# Patient Record
Sex: FEMALE | Race: WHITE | Employment: OTHER | URBAN - METROPOLITAN AREA
[De-identification: names, ages, dates, MRNs, and addresses within clinical notes are randomized per-mention and may not be internally consistent; named-entity substitution may affect disease eponyms.]

---

## 2017-01-25 ENCOUNTER — OFFICE VISIT (OUTPATIENT)
Dept: FAMILY MEDICINE CLINIC | Facility: CLINIC | Age: 82
End: 2017-01-25

## 2017-01-25 VITALS
SYSTOLIC BLOOD PRESSURE: 148 MMHG | DIASTOLIC BLOOD PRESSURE: 83 MMHG | BODY MASS INDEX: 21.71 KG/M2 | TEMPERATURE: 98 F | HEART RATE: 92 BPM | WEIGHT: 118 LBS | HEIGHT: 62 IN

## 2017-01-25 DIAGNOSIS — J06.9 ACUTE UPPER RESPIRATORY INFECTION: Primary | ICD-10-CM

## 2017-01-25 PROCEDURE — 99213 OFFICE O/P EST LOW 20 MIN: CPT | Performed by: PHYSICIAN ASSISTANT

## 2017-01-25 PROCEDURE — G0463 HOSPITAL OUTPT CLINIC VISIT: HCPCS | Performed by: PHYSICIAN ASSISTANT

## 2017-01-25 RX ORDER — AZITHROMYCIN 250 MG/1
TABLET, FILM COATED ORAL
Qty: 6 TABLET | Refills: 0 | Status: ON HOLD | OUTPATIENT
Start: 2017-01-25 | End: 2017-02-02

## 2017-01-25 NOTE — PROGRESS NOTES
HPI:    Patient ID: Soila Fajardo is a 80year old female. HPI Comments: Patient presents for cough and nasal congestion for past three days. She has had chills on and off. Cough is productive of yellow phlegm.   She states feels congestion in her ch She is oriented to person, place, and time. She appears well-developed and well-nourished. No distress. HENT:   Head: Normocephalic and atraumatic.    Right Ear: Tympanic membrane and ear canal normal.   Left Ear: Tympanic membrane and ear canal normal.

## 2017-01-28 ENCOUNTER — OFFICE VISIT (OUTPATIENT)
Dept: FAMILY MEDICINE CLINIC | Facility: CLINIC | Age: 82
End: 2017-01-28

## 2017-01-28 ENCOUNTER — APPOINTMENT (OUTPATIENT)
Dept: LAB | Facility: HOSPITAL | Age: 82
DRG: 195 | End: 2017-01-28
Attending: PHYSICIAN ASSISTANT
Payer: MEDICARE

## 2017-01-28 ENCOUNTER — HOSPITAL ENCOUNTER (OUTPATIENT)
Dept: GENERAL RADIOLOGY | Facility: HOSPITAL | Age: 82
Discharge: HOME | DRG: 195 | End: 2017-01-28
Attending: PHYSICIAN ASSISTANT | Admitting: PHYSICIAN ASSISTANT
Payer: MEDICARE

## 2017-01-28 VITALS
SYSTOLIC BLOOD PRESSURE: 148 MMHG | DIASTOLIC BLOOD PRESSURE: 85 MMHG | HEART RATE: 101 BPM | WEIGHT: 117 LBS | BODY MASS INDEX: 21.53 KG/M2 | TEMPERATURE: 100 F | HEIGHT: 62 IN

## 2017-01-28 DIAGNOSIS — J20.9 ACUTE BRONCHITIS, UNSPECIFIED ORGANISM: ICD-10-CM

## 2017-01-28 DIAGNOSIS — J20.9 ACUTE BRONCHITIS, UNSPECIFIED ORGANISM: Primary | ICD-10-CM

## 2017-01-28 PROCEDURE — 99213 OFFICE O/P EST LOW 20 MIN: CPT | Performed by: PHYSICIAN ASSISTANT

## 2017-01-28 PROCEDURE — 71020 XR CHEST PA + LAT CHEST (CPT=71020): CPT

## 2017-01-28 PROCEDURE — G0463 HOSPITAL OUTPT CLINIC VISIT: HCPCS | Performed by: PHYSICIAN ASSISTANT

## 2017-01-28 RX ORDER — BENZONATATE 100 MG/1
100 CAPSULE ORAL 3 TIMES DAILY PRN
Qty: 30 CAPSULE | Refills: 0 | Status: SHIPPED | OUTPATIENT
Start: 2017-01-28 | End: 2017-02-10 | Stop reason: ALTCHOICE

## 2017-01-28 NOTE — PROGRESS NOTES
HPI:    Patient ID: Bg Powell is a 80year old female. HPI Comments: Patient presents for follow up on cough. She states cough has worsened in past couple days. She has had fever and chills on and off.   She is currently on Zithromax and has two route  every day Disp:  Rfl:    Omega-3 Fatty Acids (RA FISH OIL) 1000 MG Oral Cap Take  by mouth.  FISH OIL (unknown strength) Disp:  Rfl:    latanoprost (XALATAN) 0.005 % Ophthalmic Solution  Disp:  Rfl:      Allergies:No Known Allergies   PHYSICAL EXAM: Imaging & Referrals:  XR CHEST PA + LAT CHEST (CPT=[id number])       OU#[id number]

## 2017-01-30 ENCOUNTER — APPOINTMENT (OUTPATIENT)
Dept: GENERAL RADIOLOGY | Facility: HOSPITAL | Age: 82
DRG: 195 | End: 2017-01-30
Attending: EMERGENCY MEDICINE
Payer: MEDICARE

## 2017-01-30 ENCOUNTER — HOSPITAL ENCOUNTER (INPATIENT)
Facility: HOSPITAL | Age: 82
LOS: 3 days | Discharge: HOME | DRG: 195 | End: 2017-02-02
Attending: EMERGENCY MEDICINE | Admitting: HOSPITALIST
Payer: MEDICARE

## 2017-01-30 DIAGNOSIS — J18.9 COMMUNITY ACQUIRED PNEUMONIA: Primary | ICD-10-CM

## 2017-01-30 DIAGNOSIS — R09.02 HYPOXIA: ICD-10-CM

## 2017-01-30 LAB
ADENOVIRUS PCR:: NEGATIVE
ANION GAP SERPL CALC-SCNC: 10 MMOL/L (ref 0–18)
B PERT DNA SPEC QL NAA+PROBE: NEGATIVE
BASOPHILS # BLD: 0 K/UL (ref 0–0.2)
BASOPHILS NFR BLD: 1 %
BNP SERPL-MCNC: 40 PG/ML (ref 0–100)
BUN SERPL-MCNC: 9 MG/DL (ref 8–20)
BUN/CREAT SERPL: 14.8 (ref 10–20)
C PNEUM DNA SPEC QL NAA+PROBE: NEGATIVE
CALCIUM SERPL-MCNC: 8.9 MG/DL (ref 8.5–10.5)
CHLORIDE SERPL-SCNC: 102 MMOL/L (ref 95–110)
CO2 SERPL-SCNC: 26 MMOL/L (ref 22–32)
CORONAVIRUS 229E PCR:: NEGATIVE
CORONAVIRUS HKU1 PCR:: NEGATIVE
CORONAVIRUS NL63 PCR:: NEGATIVE
CORONAVIRUS OC43 PCR:: NEGATIVE
CREAT SERPL-MCNC: 0.61 MG/DL (ref 0.5–1.5)
EOSINOPHIL # BLD: 0.1 K/UL (ref 0–0.7)
EOSINOPHIL NFR BLD: 2 %
ERYTHROCYTE [DISTWIDTH] IN BLOOD BY AUTOMATED COUNT: 13 % (ref 11–15)
FLUAV H1 2009 PAND RNA SPEC QL NAA+PROBE: NEGATIVE
FLUAV H1 RNA SPEC QL NAA+PROBE: NEGATIVE
FLUAV H3 RNA SPEC QL NAA+PROBE: NEGATIVE
FLUAV RNA SPEC QL NAA+PROBE: NEGATIVE
FLUBV RNA SPEC QL NAA+PROBE: NEGATIVE
GLUCOSE SERPL-MCNC: 98 MG/DL (ref 70–99)
HCT VFR BLD AUTO: 39.1 % (ref 35–48)
HGB BLD-MCNC: 13.4 G/DL (ref 12–16)
LYMPHOCYTES # BLD: 1.4 K/UL (ref 1–4)
LYMPHOCYTES NFR BLD: 36 %
MCH RBC QN AUTO: 32.1 PG (ref 27–32)
MCHC RBC AUTO-ENTMCNC: 34.3 G/DL (ref 32–37)
MCV RBC AUTO: 93.6 FL (ref 80–100)
METAPNEUMOVIRUS PCR:: NEGATIVE
MONOCYTES # BLD: 0.5 K/UL (ref 0–1)
MONOCYTES NFR BLD: 12 %
MYCOPLASMA PNEUMONIA PCR:: NEGATIVE
NEUTROPHILS # BLD AUTO: 1.9 K/UL (ref 1.8–7.7)
NEUTROPHILS NFR BLD: 49 %
OSMOLALITY UR CALC.SUM OF ELEC: 285 MOSM/KG (ref 275–295)
PARAINFLUENZA 1 PCR:: NEGATIVE
PARAINFLUENZA 2 PCR:: NEGATIVE
PARAINFLUENZA 3 PCR:: NEGATIVE
PARAINFLUENZA 4 PCR:: NEGATIVE
PLATELET # BLD AUTO: 189 K/UL (ref 140–400)
PMV BLD AUTO: 7.5 FL (ref 7.4–10.3)
POTASSIUM SERPL-SCNC: 3.7 MMOL/L (ref 3.3–5.1)
RBC # BLD AUTO: 4.18 M/UL (ref 3.7–5.4)
RHINOVIRUS/ENTERO PCR:: NEGATIVE
RSV RNA SPEC QL NAA+PROBE: POSITIVE
SODIUM SERPL-SCNC: 138 MMOL/L (ref 136–144)
TROPONIN I SERPL-MCNC: 0 NG/ML (ref ?–0.03)
WBC # BLD AUTO: 3.9 K/UL (ref 4–11)

## 2017-01-30 PROCEDURE — 99222 1ST HOSP IP/OBS MODERATE 55: CPT | Performed by: HOSPITALIST

## 2017-01-30 PROCEDURE — 71020 XR CHEST PA + LAT CHEST (CPT=71020): CPT

## 2017-01-30 RX ORDER — HEPARIN SODIUM 5000 [USP'U]/ML
5000 INJECTION, SOLUTION INTRAVENOUS; SUBCUTANEOUS EVERY 12 HOURS
Status: DISCONTINUED | OUTPATIENT
Start: 2017-01-30 | End: 2017-02-02

## 2017-01-30 RX ORDER — SODIUM CHLORIDE 9 MG/ML
INJECTION, SOLUTION INTRAVENOUS ONCE
Status: DISCONTINUED | OUTPATIENT
Start: 2017-01-30 | End: 2017-01-31

## 2017-01-30 RX ORDER — TETANUS AND DIPHTHERIA TOXOIDS ADSORBED 2; 2 [LF]/.5ML; [LF]/.5ML
INJECTION INTRAMUSCULAR
Status: DISCONTINUED
Start: 2017-01-30 | End: 2017-01-30

## 2017-01-30 RX ORDER — ONDANSETRON 2 MG/ML
4 INJECTION INTRAMUSCULAR; INTRAVENOUS EVERY 6 HOURS PRN
Status: DISCONTINUED | OUTPATIENT
Start: 2017-01-30 | End: 2017-02-02

## 2017-01-30 RX ORDER — SODIUM CHLORIDE 9 MG/ML
INJECTION, SOLUTION INTRAVENOUS CONTINUOUS
Status: DISCONTINUED | OUTPATIENT
Start: 2017-01-30 | End: 2017-02-02

## 2017-01-30 RX ORDER — ALBUTEROL SULFATE 2.5 MG/3ML
2.5 SOLUTION RESPIRATORY (INHALATION) EVERY 4 HOURS PRN
Status: DISCONTINUED | OUTPATIENT
Start: 2017-01-30 | End: 2017-02-02

## 2017-01-30 RX ORDER — GUAIFENESIN 100 MG/5ML
200 SOLUTION ORAL EVERY 4 HOURS PRN
Status: DISCONTINUED | OUTPATIENT
Start: 2017-01-30 | End: 2017-02-02

## 2017-01-30 RX ORDER — ACETAMINOPHEN 325 MG/1
650 TABLET ORAL EVERY 6 HOURS PRN
Status: DISCONTINUED | OUTPATIENT
Start: 2017-01-30 | End: 2017-02-02

## 2017-01-30 RX ORDER — IPRATROPIUM BROMIDE AND ALBUTEROL SULFATE 2.5; .5 MG/3ML; MG/3ML
3 SOLUTION RESPIRATORY (INHALATION) ONCE
Status: COMPLETED | OUTPATIENT
Start: 2017-01-30 | End: 2017-01-30

## 2017-01-30 RX ORDER — POTASSIUM CHLORIDE 20 MEQ/1
40 TABLET, EXTENDED RELEASE ORAL ONCE
Status: COMPLETED | OUTPATIENT
Start: 2017-01-31 | End: 2017-01-31

## 2017-01-30 NOTE — ED INITIAL ASSESSMENT (HPI)
Patient c/o productive cough, low grade fever, congestion x 5 days. Patient states she had xray done outpatient Saturday, which came back negative. States she finished last of z miek yesterday without improvement.

## 2017-01-30 NOTE — ED PROVIDER NOTES
Patient Seen in: [facility] Emergency Department    History   Patient presents with:  Cough/URI    Stated Complaint: Cough, back pain     HPI    The patient is a 41-year-old female with past history of hyperlipidemia, gastroesophageal reflux disea tablet   Multiple Vitamins-Minerals (CENTRUM SILVER) Oral Tab,  Take  by mouth. take 1 tablet by oral route  every day   Omega-3 Fatty Acids (RA FISH OIL) 1000 MG Oral Cap,  Take  by mouth.  FISH OIL (unknown strength)   latanoprost (XALATAN) 0.005 % Ophtha nondistended  Neurologic: Patient is awake, alert and oriented ×3.   The patient's motor strength is 5 out of 5 and symmetric in the upper and lower extremities bilaterally  Extremities: No focal swelling or tenderness  Skin: No pallor, no redness or warmth Medication List        Present on Admission  Date Reviewed: 1/28/2017          ICD-10-CM Noted POA    Community acquired pneumonia J18.9 1/30/2017 Unknown

## 2017-01-31 ENCOUNTER — APPOINTMENT (OUTPATIENT)
Dept: GENERAL RADIOLOGY | Facility: HOSPITAL | Age: 82
DRG: 195 | End: 2017-01-31
Attending: HOSPITALIST
Payer: MEDICARE

## 2017-01-31 LAB
ANION GAP SERPL CALC-SCNC: 5 MMOL/L (ref 0–18)
BASOPHILS # BLD: 0 K/UL (ref 0–0.2)
BASOPHILS NFR BLD: 1 %
BUN SERPL-MCNC: 6 MG/DL (ref 8–20)
BUN/CREAT SERPL: 12.5 (ref 10–20)
CALCIUM SERPL-MCNC: 8.2 MG/DL (ref 8.5–10.5)
CHLORIDE SERPL-SCNC: 109 MMOL/L (ref 95–110)
CO2 SERPL-SCNC: 27 MMOL/L (ref 22–32)
CREAT SERPL-MCNC: 0.48 MG/DL (ref 0.5–1.5)
EOSINOPHIL # BLD: 0.1 K/UL (ref 0–0.7)
EOSINOPHIL NFR BLD: 2 %
ERYTHROCYTE [DISTWIDTH] IN BLOOD BY AUTOMATED COUNT: 12.7 % (ref 11–15)
GLUCOSE SERPL-MCNC: 90 MG/DL (ref 70–99)
HCT VFR BLD AUTO: 34.2 % (ref 35–48)
HGB BLD-MCNC: 12 G/DL (ref 12–16)
L PNEUMO AG UR QL: NEGATIVE
LYMPHOCYTES # BLD: 1.3 K/UL (ref 1–4)
LYMPHOCYTES NFR BLD: 36 %
MCH RBC QN AUTO: 32.6 PG (ref 27–32)
MCHC RBC AUTO-ENTMCNC: 35 G/DL (ref 32–37)
MCV RBC AUTO: 93.1 FL (ref 80–100)
MONOCYTES # BLD: 0.6 K/UL (ref 0–1)
MONOCYTES NFR BLD: 15 %
NEUTROPHILS # BLD AUTO: 1.7 K/UL (ref 1.8–7.7)
NEUTROPHILS NFR BLD: 46 %
OSMOLALITY UR CALC.SUM OF ELEC: 289 MOSM/KG (ref 275–295)
PLATELET # BLD AUTO: 178 K/UL (ref 140–400)
PMV BLD AUTO: 7.9 FL (ref 7.4–10.3)
POTASSIUM SERPL-SCNC: 4 MMOL/L (ref 3.3–5.1)
POTASSIUM SERPL-SCNC: 4 MMOL/L (ref 3.3–5.1)
RBC # BLD AUTO: 3.67 M/UL (ref 3.7–5.4)
SODIUM SERPL-SCNC: 141 MMOL/L (ref 136–144)
STREP PNEUMO ANTIGEN, URINE: NEGATIVE
WBC # BLD AUTO: 3.6 K/UL (ref 4–11)

## 2017-01-31 PROCEDURE — 71020 XR CHEST PA + LAT CHEST (CPT=71020): CPT

## 2017-01-31 RX ORDER — BENZONATATE 100 MG/1
100 CAPSULE ORAL 3 TIMES DAILY PRN
Status: DISCONTINUED | OUTPATIENT
Start: 2017-01-31 | End: 2017-02-02

## 2017-01-31 RX ORDER — PANTOPRAZOLE SODIUM 40 MG/1
40 TABLET, DELAYED RELEASE ORAL
Status: DISCONTINUED | OUTPATIENT
Start: 2017-01-31 | End: 2017-02-02

## 2017-01-31 RX ORDER — LATANOPROST 50 UG/ML
1 SOLUTION/ DROPS OPHTHALMIC NIGHTLY
Status: DISCONTINUED | OUTPATIENT
Start: 2017-01-31 | End: 2017-02-02

## 2017-01-31 RX ORDER — ASPIRIN 81 MG/1
81 TABLET ORAL DAILY
Status: DISCONTINUED | OUTPATIENT
Start: 2017-01-31 | End: 2017-02-02

## 2017-01-31 RX ORDER — OYSTER SHELL CALCIUM WITH VITAMIN D 500; 200 MG/1; [IU]/1
1 TABLET, FILM COATED ORAL DAILY
Status: DISCONTINUED | OUTPATIENT
Start: 2017-01-31 | End: 2017-02-02

## 2017-01-31 NOTE — PROGRESS NOTES
[facility]    Progress Note    Danielle Mcclure Patient Status:  Inpatient    1930 MRN [id number]   Location [facility] 5SW/SE Attending Heather Orellana MD   Hosp Day # 1 PCP Sky Kothari MD       SUBJECTIVE:  Feeling m opacity which could represent atelectasis/scar or pneumonia/infiltrate.         Meds:     Current Facility-Administered Medications:  aspirin EC tab 81 mg 81 mg Oral Daily   benzonatate (TESSALON) cap 100 mg 100 mg Oral TID PRN   Calcium Carbonate-Vitamin D treated w/ azithromycin since Wednesday prior to admission without improvement  Unclear if early pneumonia was not responsive to azithromycin or if patient was only infected with virus at that time  Plan:  Continue ceftriaxone and azithromycin  Conservativ

## 2017-01-31 NOTE — PLAN OF CARE
Patient/Family Long Term Goal Not Progressing    PNEUMONIA TO BE RESOLVED, OPTIMAL RESPIRATORY STATUS  Patient/Family Short Term Goal Not Progressing    VITALS SIGNS STABLE, NO RESPIRATORY DISTRESS

## 2017-01-31 NOTE — PLAN OF CARE
Infection    • Lung sounds are clear/returned to baseline bilaterally Progressing    • Signs and symptoms of infections are decreased or avoided Progressing        Patient/Family Goals    • Patient/Family Long Term Goal Progressing    • Patient/Family Milly Rutherford

## 2017-01-31 NOTE — PLAN OF CARE
Problem: Patient/Family Goals  Goal: Patient/Family Short Term Goal  Patient’s Short Term Goal: VITAL SIGNS STABLE, NO RESPIRATORY DISTRESS    Interventions:   RESPIRATORY TREATMENTS, IV ANTIBIOTICS  - See additional Care Plan goals for specific interventi

## 2017-01-31 NOTE — PLAN OF CARE
Infection    • Lung sounds are clear/returned to baseline bilaterally Progressing    • Signs and symptoms of infections are decreased or avoided Progressing        Patient/Family Goals    • Patient/Family Long Term Goal Progressing    • Patient/Family Iona Din

## 2017-01-31 NOTE — CM/SW NOTE
Met with patient at bedside to explain the BPCI/Medicare program. Patient agreed with phone follow up for 3 months from [facility] after discharge from [address] [place] [address]. Patient was enrolled under DRG  195     . BPCI/Medicare letter and brochure provided.  Pt infor

## 2017-01-31 NOTE — H&P
[facility]    PATIENT'S NAME: Clinton Pope EL   ATTENDING PHYSICIAN: Ced Taylor MD   PATIENT ACCOUNT#:   [id number]    LOCATION:  [place] [address]  MEDICAL RECORD #:   [id number]       YOB: 1930  ADMISSION DATE:       01/30/2 EXAMINATION:    GENERAL:  Alert and oriented to time, place, and person. Mild to moderate distress. VITAL SIGNS:  Temperature 98.3, pulse 76, respiratory rate 18, blood pressure 186/78, and pulse oximetry 95% on room air. HEENT:  Atraumatic.   Odette Pitcher

## 2017-01-31 NOTE — ED NOTES
Patient presents to ED with persistent SOB and productive cough since last Wednesday. Pt states that she visited her PCP on Wednesday, was given antibiotics, and sent home.  On Saturday, she went to the immediate care clinic for persistent symptoms and a ch

## 2017-01-31 NOTE — PLAN OF CARE
Problem: Patient/Family Goals  Goal: Patient/Family Long Term Goal  Patient’s Long Term Goal: PNEUMONIA TO BE RESOLVED, OPTIMAL RESPIRATORY STATUS    Interventions:  IV ANTIBIOTICS, DUONEB TREATMENTS  - See additional Care Plan goals for specific intervent

## 2017-02-01 LAB
ANION GAP SERPL CALC-SCNC: 8 MMOL/L (ref 0–18)
BASOPHILS # BLD: 0 K/UL (ref 0–0.2)
BASOPHILS NFR BLD: 1 %
BUN SERPL-MCNC: 7 MG/DL (ref 8–20)
BUN/CREAT SERPL: 11.5 (ref 10–20)
CALCIUM SERPL-MCNC: 9 MG/DL (ref 8.5–10.5)
CHLORIDE SERPL-SCNC: 105 MMOL/L (ref 95–110)
CO2 SERPL-SCNC: 26 MMOL/L (ref 22–32)
CREAT SERPL-MCNC: 0.61 MG/DL (ref 0.5–1.5)
EOSINOPHIL # BLD: 0.2 K/UL (ref 0–0.7)
EOSINOPHIL NFR BLD: 5 %
ERYTHROCYTE [DISTWIDTH] IN BLOOD BY AUTOMATED COUNT: 13.1 % (ref 11–15)
GLUCOSE SERPL-MCNC: 96 MG/DL (ref 70–99)
HCT VFR BLD AUTO: 37.7 % (ref 35–48)
HGB BLD-MCNC: 12.9 G/DL (ref 12–16)
LYMPHOCYTES # BLD: 1.4 K/UL (ref 1–4)
LYMPHOCYTES NFR BLD: 35 %
MCH RBC QN AUTO: 31.8 PG (ref 27–32)
MCHC RBC AUTO-ENTMCNC: 34.1 G/DL (ref 32–37)
MCV RBC AUTO: 93.2 FL (ref 80–100)
MONOCYTES # BLD: 0.5 K/UL (ref 0–1)
MONOCYTES NFR BLD: 11 %
NEUTROPHILS # BLD AUTO: 2 K/UL (ref 1.8–7.7)
NEUTROPHILS NFR BLD: 49 %
OSMOLALITY UR CALC.SUM OF ELEC: 286 MOSM/KG (ref 275–295)
PLATELET # BLD AUTO: 207 K/UL (ref 140–400)
PMV BLD AUTO: 8.1 FL (ref 7.4–10.3)
POTASSIUM SERPL-SCNC: 4 MMOL/L (ref 3.3–5.1)
RBC # BLD AUTO: 4.05 M/UL (ref 3.7–5.4)
SODIUM SERPL-SCNC: 139 MMOL/L (ref 136–144)
WBC # BLD AUTO: 4.1 K/UL (ref 4–11)

## 2017-02-01 PROCEDURE — 99233 SBSQ HOSP IP/OBS HIGH 50: CPT | Performed by: HOSPITALIST

## 2017-02-01 NOTE — DISCHARGE PLANNING
PERFECTO received for dc planning for possible hhc. Pt intends to return home. She is active and independent, does not intend to be homebound. RN confirmed the pt is on room air, no current needs identified.     alton heredia,JUAN [id number]

## 2017-02-01 NOTE — PLAN OF CARE
Infection    • Lung sounds are clear/returned to baseline bilaterally Progressing    • Signs and symptoms of infections are decreased or avoided Progressing        Patient/Family Goals    • Patient/Family Long Term Goal Progressing    • Patient/Family Esa Guzman

## 2017-02-01 NOTE — PLAN OF CARE
DISCHARGE PLANNING    • Discharge to home or other facility with appropriate resources Progressing        Infection    • Lung sounds are clear/returned to baseline bilaterally Progressing    • Signs and symptoms of infections are decreased or avoided Progr

## 2017-02-01 NOTE — PROGRESS NOTES
[facility]    Progress Note    Philippe Rojas Patient Status:  Inpatient    1930 MRN [id number]   Location [facility] 5SW/SE Attending Mary Carmen Perkins MD   Hosp Day # 2 PCP Elena Whitman MD     Subjective:  Coughing more today (cpt=71020)    1/30/2017  CONCLUSION:  1.  Pulmonary emphysema. 2.  Left upper lobe lingular segment pulmonary opacity which could represent atelectasis/scar or pneumonia/infiltrate.       Ekg 12-lead    1/30/2017  ECG Report  Interpretation  --------------

## 2017-02-02 VITALS
HEART RATE: 75 BPM | OXYGEN SATURATION: 92 % | WEIGHT: 115 LBS | BODY MASS INDEX: 21.16 KG/M2 | SYSTOLIC BLOOD PRESSURE: 139 MMHG | TEMPERATURE: 98 F | HEIGHT: 62 IN | RESPIRATION RATE: 18 BRPM | DIASTOLIC BLOOD PRESSURE: 47 MMHG

## 2017-02-02 PROCEDURE — 99239 HOSP IP/OBS DSCHRG MGMT >30: CPT | Performed by: HOSPITALIST

## 2017-02-02 RX ORDER — AZITHROMYCIN 250 MG/1
250 TABLET, FILM COATED ORAL DAILY
Qty: 3 TABLET | Refills: 0 | Status: SHIPPED | OUTPATIENT
Start: 2017-02-02 | End: 2017-02-05

## 2017-02-02 RX ORDER — ALBUTEROL SULFATE 90 UG/1
2 AEROSOL, METERED RESPIRATORY (INHALATION) EVERY 6 HOURS PRN
Qty: 1 INHALER | Refills: 0 | Status: SHIPPED | OUTPATIENT
Start: 2017-02-02 | End: 2017-02-10 | Stop reason: ALTCHOICE

## 2017-02-02 NOTE — DISCHARGE SUMMARY
[facility]    Discharge Summary    Ellen Ruiz Patient Status:  Inpatient    1930 MRN [id number]   [facility] 5SW/SE Attending Hill Mcnulty MD   [facility] Day # 3 PCP Noretta Harada, MD     Date of Admission: 2017 Instructions Prescription details    azithromycin 250 MG Tabs   Commonly known as:  Amy Coronel   What changed:    - how much to take  - how to take this  - when to take this  - additional instructions        Take 1 tablet (250 mg total) by mouth daily.     Bernardo Victoria Albuterol Sulfate  (90 Base) MCG/ACT Aers  - azithromycin 250 MG Tabs        Follow up Visits: Follow-up Information     Follow up with Era Mckeon MD In 1 week.     Specialty:  Internal Medicine    Contact information:    [place]

## 2017-02-02 NOTE — PLAN OF CARE
DISCHARGE PLANNING    • Discharge to home or other facility with appropriate resources Progressing        Plan is discharge tomorrow.      Infection    • Lung sounds are clear/returned to baseline bilaterally Progressing    • Signs and symptoms of infection

## 2017-02-03 ENCOUNTER — TELEPHONE (OUTPATIENT)
Dept: MEDSURG UNIT | Facility: HOSPITAL | Age: 82
End: 2017-02-03

## 2017-02-10 ENCOUNTER — OFFICE VISIT (OUTPATIENT)
Dept: FAMILY MEDICINE CLINIC | Facility: CLINIC | Age: 82
End: 2017-02-10

## 2017-02-10 VITALS
SYSTOLIC BLOOD PRESSURE: 145 MMHG | WEIGHT: 114 LBS | BODY MASS INDEX: 20.98 KG/M2 | HEART RATE: 84 BPM | HEIGHT: 62 IN | DIASTOLIC BLOOD PRESSURE: 77 MMHG

## 2017-02-10 DIAGNOSIS — J18.9 COMMUNITY ACQUIRED PNEUMONIA: Primary | ICD-10-CM

## 2017-02-10 PROCEDURE — 99213 OFFICE O/P EST LOW 20 MIN: CPT | Performed by: PHYSICIAN ASSISTANT

## 2017-02-10 PROCEDURE — G0463 HOSPITAL OUTPT CLINIC VISIT: HCPCS | Performed by: PHYSICIAN ASSISTANT

## 2017-02-10 NOTE — PROGRESS NOTES
HPI:    Patient ID: Luis Roa is a 80year old female. HPI Comments: Patient presents for follow up from hospital for pneumonia. Patient was seen in office for cough which had been progressing.   She had chest xray as ordered 1/28/17 which was neg (XALATAN) 0.005 % Ophthalmic Solution Place 1 drop into both eyes nightly. Disp:  Rfl:      Allergies:No Known Allergies   PHYSICAL EXAM:   Physical Exam   Constitutional: She is oriented to person, place, and time.  She appears well-developed and well-no

## 2017-03-08 ENCOUNTER — OFFICE VISIT (OUTPATIENT)
Dept: INTERNAL MEDICINE CLINIC | Facility: CLINIC | Age: 82
End: 2017-03-08

## 2017-03-08 VITALS
WEIGHT: 117 LBS | TEMPERATURE: 98 F | HEIGHT: 62 IN | BODY MASS INDEX: 21.53 KG/M2 | SYSTOLIC BLOOD PRESSURE: 120 MMHG | DIASTOLIC BLOOD PRESSURE: 71 MMHG | HEART RATE: 67 BPM

## 2017-03-08 DIAGNOSIS — J18.9 COMMUNITY ACQUIRED PNEUMONIA: Primary | ICD-10-CM

## 2017-03-08 DIAGNOSIS — Z12.31 VISIT FOR SCREENING MAMMOGRAM: ICD-10-CM

## 2017-03-08 DIAGNOSIS — M81.0 OSTEOPOROSIS: ICD-10-CM

## 2017-03-08 DIAGNOSIS — K21.9 GASTRIC REFLUX SYNDROME: ICD-10-CM

## 2017-03-08 PROCEDURE — G0463 HOSPITAL OUTPT CLINIC VISIT: HCPCS | Performed by: INTERNAL MEDICINE

## 2017-03-08 PROCEDURE — 99214 OFFICE O/P EST MOD 30 MIN: CPT | Performed by: INTERNAL MEDICINE

## 2017-03-08 NOTE — PROGRESS NOTES
HPI:    Patient ID: Angela Clark is a 80year old female. HPI patient is a very active 15-year-old white female who comes in to address her medical issues  She was recently hospitalized with a left upper lobe community-acquired pneumonia.   Chest x-ra and time. She appears well-developed and well-nourished. Cardiovascular: Normal rate, regular rhythm, normal heart sounds and intact distal pulses. Pulmonary/Chest: Effort normal and breath sounds normal.   Abdominal: Soft.  Bowel sounds are normal.

## 2017-05-16 ENCOUNTER — OFFICE VISIT (OUTPATIENT)
Dept: PODIATRY CLINIC | Facility: CLINIC | Age: 82
End: 2017-05-16

## 2017-05-16 DIAGNOSIS — L60.0 INGROWN RIGHT BIG TOENAIL: ICD-10-CM

## 2017-05-16 DIAGNOSIS — M79.674 PAIN OF TOE OF RIGHT FOOT: Primary | ICD-10-CM

## 2017-05-16 PROCEDURE — G0463 HOSPITAL OUTPT CLINIC VISIT: HCPCS | Performed by: PODIATRIST

## 2017-05-16 PROCEDURE — 99212 OFFICE O/P EST SF 10 MIN: CPT | Performed by: PODIATRIST

## 2017-05-16 RX ORDER — ERYTHROMYCIN 5 MG/G
OINTMENT OPHTHALMIC
COMMUNITY
Start: 2017-04-25 | End: 2018-01-25 | Stop reason: ALTCHOICE

## 2017-05-16 RX ORDER — OFLOXACIN 3 MG/ML
SOLUTION/ DROPS OPHTHALMIC
COMMUNITY
Start: 2017-04-25 | End: 2021-06-15 | Stop reason: ALTCHOICE

## 2017-05-16 NOTE — PROGRESS NOTES
HPI:    Patient ID: Keith Swann is a 80year old female. HPI  This pleasant 26-year-old female presents as a new patient to me today with a chief complaint of right great toe pain. She is self-referred.   The pain she is experiencing is within the g tender on direct pressure. There is some degree of incurvation profound keratosis and debris is noted along the medial border of this nail consistent with an ingrown toenail. There is no evidence of present infection.   Debridement of this nail was accomp

## 2017-05-17 RX ORDER — OMEPRAZOLE 20 MG/1
CAPSULE, DELAYED RELEASE ORAL
Qty: 90 CAPSULE | Refills: 0 | Status: SHIPPED | OUTPATIENT
Start: 2017-05-17 | End: 2018-06-04

## 2017-06-01 ENCOUNTER — OFFICE VISIT (OUTPATIENT)
Dept: INTERNAL MEDICINE CLINIC | Facility: CLINIC | Age: 82
End: 2017-06-01

## 2017-06-01 ENCOUNTER — LAB ENCOUNTER (OUTPATIENT)
Dept: LAB | Age: 82
End: 2017-06-01
Attending: INTERNAL MEDICINE
Payer: MEDICARE

## 2017-06-01 VITALS
BODY MASS INDEX: 21 KG/M2 | DIASTOLIC BLOOD PRESSURE: 69 MMHG | RESPIRATION RATE: 18 BRPM | SYSTOLIC BLOOD PRESSURE: 150 MMHG | WEIGHT: 117 LBS | HEART RATE: 62 BPM

## 2017-06-01 DIAGNOSIS — Z12.31 ENCOUNTER FOR SCREENING MAMMOGRAM FOR MALIGNANT NEOPLASM OF BREAST: ICD-10-CM

## 2017-06-01 DIAGNOSIS — M81.0 OSTEOPOROSIS, UNSPECIFIED OSTEOPOROSIS TYPE, UNSPECIFIED PATHOLOGICAL FRACTURE PRESENCE: ICD-10-CM

## 2017-06-01 DIAGNOSIS — J30.9 ALLERGIC SINUSITIS: ICD-10-CM

## 2017-06-01 DIAGNOSIS — E55.9 VITAMIN D DEFICIENCY: ICD-10-CM

## 2017-06-01 DIAGNOSIS — M81.0 OSTEOPOROSIS, UNSPECIFIED OSTEOPOROSIS TYPE, UNSPECIFIED PATHOLOGICAL FRACTURE PRESENCE: Primary | ICD-10-CM

## 2017-06-01 PROCEDURE — 80053 COMPREHEN METABOLIC PANEL: CPT

## 2017-06-01 PROCEDURE — 82306 VITAMIN D 25 HYDROXY: CPT

## 2017-06-01 PROCEDURE — 99213 OFFICE O/P EST LOW 20 MIN: CPT | Performed by: INTERNAL MEDICINE

## 2017-06-01 PROCEDURE — 36415 COLL VENOUS BLD VENIPUNCTURE: CPT

## 2017-06-01 PROCEDURE — G0463 HOSPITAL OUTPT CLINIC VISIT: HCPCS | Performed by: INTERNAL MEDICINE

## 2017-06-01 PROCEDURE — 85025 COMPLETE CBC W/AUTO DIFF WBC: CPT

## 2017-06-01 RX ORDER — FLUTICASONE PROPIONATE 50 MCG
2 SPRAY, SUSPENSION (ML) NASAL DAILY
Qty: 3 BOTTLE | Refills: 0 | Status: SHIPPED | OUTPATIENT
Start: 2017-06-01 | End: 2018-06-04

## 2017-06-02 ENCOUNTER — TELEPHONE (OUTPATIENT)
Dept: INTERNAL MEDICINE CLINIC | Facility: CLINIC | Age: 82
End: 2017-06-02

## 2017-06-02 NOTE — PROGRESS NOTES
HPI:    Patient ID: Heather Parmar is a 80year old female presents for follow-up with osteoporosis, GERD symptoms. HPI  Patient is new to me, he is a former patient of Dr. Gloria Gillette.   She was diagnosed with osteoporosis several years ago, initially she t MORNING BEFORE BREAKFAST Disp: 90 capsule Rfl: 0   erythromycin 5 MG/GM Ophthalmic Ointment  Disp:  Rfl:    ofloxacin 0.3 % Ophthalmic Solution  Disp:  Rfl:    NON FORMULARY Inject 60 mg into the skin every 6 (six) months.  PROLIA Disp:  Rfl:    aspirin (AS alert and oriented to person, place, and time. No cranial nerve deficit or motor deficit. Gait normal.   Psychiatric: She has a normal mood and affect.  Her behavior is normal. Judgment normal.            ASSESSMENT/PLAN:   Osteoporosis, unspecified osteopo

## 2017-06-02 NOTE — TELEPHONE ENCOUNTER
Please proces prior auth for prolia per Dr. Melquiades Prasad.       denosumab (PROLIA) 60 MG/ML Subcutaneous Solution 1 mL 0 6/1/2017       Sig :  Inject 60mg subcutaneously every 6 months for 2 years       Route:   (none)       Comment:   Standing order       Class:

## 2017-06-03 ENCOUNTER — TELEPHONE (OUTPATIENT)
Dept: INTERNAL MEDICINE CLINIC | Facility: CLINIC | Age: 82
End: 2017-06-03

## 2017-06-03 DIAGNOSIS — R93.89 ABNORMAL CHEST X-RAY: Primary | ICD-10-CM

## 2017-06-03 DIAGNOSIS — R93.7 ABNORMAL BONE RADIOGRAPH: ICD-10-CM

## 2017-06-03 NOTE — TELEPHONE ENCOUNTER
Pt is returning the call to the PCP [facility]   Pt can be reached @ [phone]  Please advise

## 2017-06-12 ENCOUNTER — OFFICE VISIT (OUTPATIENT)
Dept: DERMATOLOGY CLINIC | Facility: CLINIC | Age: 82
End: 2017-06-12

## 2017-06-12 DIAGNOSIS — L82.0 INFLAMED SEBORRHEIC KERATOSIS: ICD-10-CM

## 2017-06-12 DIAGNOSIS — L57.0 ACTINIC KERATOSIS: ICD-10-CM

## 2017-06-12 DIAGNOSIS — Z86.006 HISTORY OF MELANOMA IN SITU: Primary | ICD-10-CM

## 2017-06-12 DIAGNOSIS — L72.0 MILIA: ICD-10-CM

## 2017-06-12 DIAGNOSIS — L81.4 SOLAR LENTIGO: ICD-10-CM

## 2017-06-12 DIAGNOSIS — D23.70 BENIGN NEOPLASM OF SKIN OF LOWER LIMB, INCLUDING HIP, UNSPECIFIED LATERALITY: ICD-10-CM

## 2017-06-12 DIAGNOSIS — L82.1 SEBORRHEIC KERATOSES: ICD-10-CM

## 2017-06-12 DIAGNOSIS — L72.0 EPIDERMAL CYST: ICD-10-CM

## 2017-06-12 DIAGNOSIS — D23.5 BENIGN NEOPLASM OF SKIN OF TRUNK, EXCEPT SCROTUM: ICD-10-CM

## 2017-06-12 DIAGNOSIS — D23.60 BENIGN NEOPLASM OF SKIN OF UPPER LIMB, INCLUDING SHOULDER, UNSPECIFIED LATERALITY: ICD-10-CM

## 2017-06-12 DIAGNOSIS — D23.4 BENIGN NEOPLASM OF SCALP AND SKIN OF NECK: ICD-10-CM

## 2017-06-12 DIAGNOSIS — D23.30 BENIGN NEOPLASM OF SKIN OF FACE: ICD-10-CM

## 2017-06-12 PROCEDURE — 99214 OFFICE O/P EST MOD 30 MIN: CPT | Performed by: DERMATOLOGY

## 2017-06-12 PROCEDURE — 17000 DESTRUCT PREMALG LESION: CPT | Performed by: DERMATOLOGY

## 2017-06-12 PROCEDURE — 17110 DESTRUCTION B9 LES UP TO 14: CPT | Performed by: DERMATOLOGY

## 2017-06-12 NOTE — PROGRESS NOTES
HPI:     Chief Complaint     Derm Problem        HPI     Derm Problem    Additional comments: concernd about multiple lesions requests full body check, history of melanoma       Last edited by Ramana Arredondo RN on 6/12/2017  8:10 AM. (History)         stephane (CALCIUM 500/D) 500-200 MG-UNIT Oral Tab Take  by mouth. Calcium 500 + D 500 mg (1,250 mg)-200 unit tablet Disp:  Rfl:    Multiple Vitamins-Minerals (CENTRUM SILVER) Oral Tab Take  by mouth.  take 1 tablet by oral route  every day Disp:  Rfl:    Omega-3 Fat upper extremity, buttocks, genital area, l lower extremity and right lower extremity. Also exam of scalp, cervical, axillary, inguinal, and supraclavicular lymph nodes    The patient is alert, oriented, and appears their stated age.   Patient is well zachariah working and to reapply it every few hours.     Benign neoplasm of skin of face  Benign neoplasm of skin of upper limb, including shoulder, unspecified laterality  Benign neoplasm of skin of lower limb, including hip, unspecified laterality  Benign neoplasm

## 2017-06-12 NOTE — PROGRESS NOTES
Past Medical History   Diagnosis Date   • Atypical nevus 2012     skin of right cheek   • Osteoporosis    • Other and unspecified hyperlipidemia    • Melanoma in situ of cheek ([facility] [address] 2012     \"Malignant melanoma in situ - Right cheek         Past Surgical

## 2017-06-15 ENCOUNTER — OFFICE VISIT (OUTPATIENT)
Dept: OTOLARYNGOLOGY | Facility: CLINIC | Age: 82
End: 2017-06-15

## 2017-06-15 ENCOUNTER — HOSPITAL ENCOUNTER (OUTPATIENT)
Dept: CT IMAGING | Facility: HOSPITAL | Age: 82
Discharge: HOME | End: 2017-06-15
Attending: INTERNAL MEDICINE | Admitting: OTOLARYNGOLOGY
Payer: MEDICARE

## 2017-06-15 ENCOUNTER — HOSPITAL ENCOUNTER (OUTPATIENT)
Dept: GENERAL RADIOLOGY | Facility: HOSPITAL | Age: 82
Discharge: HOME | End: 2017-06-15
Attending: INTERNAL MEDICINE
Payer: MEDICARE

## 2017-06-15 VITALS
WEIGHT: 117 LBS | SYSTOLIC BLOOD PRESSURE: 151 MMHG | BODY MASS INDEX: 20.73 KG/M2 | HEART RATE: 81 BPM | DIASTOLIC BLOOD PRESSURE: 74 MMHG | HEIGHT: 63 IN

## 2017-06-15 DIAGNOSIS — R93.7 ABNORMAL BONE RADIOGRAPH: ICD-10-CM

## 2017-06-15 DIAGNOSIS — R93.89 ABNORMAL CHEST X-RAY: ICD-10-CM

## 2017-06-15 DIAGNOSIS — R49.0 HOARSENESS: Primary | ICD-10-CM

## 2017-06-15 PROCEDURE — 73060 X-RAY EXAM OF HUMERUS: CPT | Performed by: INTERNAL MEDICINE

## 2017-06-15 PROCEDURE — G0463 HOSPITAL OUTPT CLINIC VISIT: HCPCS | Performed by: OTOLARYNGOLOGY

## 2017-06-15 PROCEDURE — 99213 OFFICE O/P EST LOW 20 MIN: CPT | Performed by: OTOLARYNGOLOGY

## 2017-06-15 PROCEDURE — 82565 ASSAY OF CREATININE: CPT

## 2017-06-15 PROCEDURE — 71260 CT THORAX DX C+: CPT | Performed by: INTERNAL MEDICINE

## 2017-06-16 NOTE — PROGRESS NOTES
Michael Henry is a 80year old female. Patient presents with:  Throat Problem: patient states she is not any better    HPI:   She has continued to take her acid reflux medicationbut does not have theglobe was cessation any longer.  She still feels that he Yes           0.5 oz/week       1 Glasses of wine per week       Comment: 1 drink weekly       REVIEW OF SYSTEMS:   GENERAL HEALTH: feels well otherwise  GENERAL : denies fever, chills, sweats, weight loss, weight gain  SKIN: denies any unusual skin lesion plan.      Charley Baxter.  Jesus Lucero MD  6/16/2017  6:35 AM

## 2017-06-16 NOTE — PATIENT INSTRUCTIONS
Tips to Control Acid Reflux    To control acid reflux, you’ll need to make some basic diet and lifestyle changes. The simple steps outlined below may be all you’ll need to ease discomfort. Watch what you eat  · Avoid fatty foods and spicy foods.   · Eat

## 2017-06-19 ENCOUNTER — TELEPHONE (OUTPATIENT)
Dept: INTERNAL MEDICINE CLINIC | Facility: CLINIC | Age: 82
End: 2017-06-19

## 2017-06-20 NOTE — TELEPHONE ENCOUNTER
Please check if Prolia injection was authorized by [employer], fertilization started on 6/6/2017, please let me know and call patient as well if it is authorized

## 2017-06-20 NOTE — TELEPHONE ENCOUNTER
Prolia approved effective 6/8/2017 patient has a $0 co-pay.  Called patient left voice message regarding Prolia approval.

## 2017-06-20 NOTE — TELEPHONE ENCOUNTER
Author: Brock Galvan LPN Service: (none) Author Type: Licensed Practical Nurse      Filed: 6/20/2017 10:51 AM Note Time: 6/20/2017 10:26 AM Status: Signed     : Brock Galvan LPN (Licensed Practical Nurse)       Expand All Collapse

## 2017-06-25 ENCOUNTER — HOSPITAL ENCOUNTER (OUTPATIENT)
Dept: MAMMOGRAPHY | Facility: HOSPITAL | Age: 82
Discharge: HOME | End: 2017-06-25
Attending: INTERNAL MEDICINE
Payer: MEDICARE

## 2017-06-25 DIAGNOSIS — Z12.31 ENCOUNTER FOR SCREENING MAMMOGRAM FOR MALIGNANT NEOPLASM OF BREAST: ICD-10-CM

## 2017-06-25 PROCEDURE — 77067 SCR MAMMO BI INCL CAD: CPT | Performed by: INTERNAL MEDICINE

## 2017-06-29 NOTE — TELEPHONE ENCOUNTER
Spoke with pt. Maryt.  Made 6- at [address] [place] [address] at [address] [place] [address] Approved 6-

## 2017-06-30 ENCOUNTER — NURSE ONLY (OUTPATIENT)
Dept: INTERNAL MEDICINE CLINIC | Facility: CLINIC | Age: 82
End: 2017-06-30

## 2017-06-30 DIAGNOSIS — M81.0 OSTEOPOROSIS, UNSPECIFIED OSTEOPOROSIS TYPE, UNSPECIFIED PATHOLOGICAL FRACTURE PRESENCE: Primary | ICD-10-CM

## 2017-06-30 PROCEDURE — 96372 THER/PROPH/DIAG INJ SC/IM: CPT | Performed by: INTERNAL MEDICINE

## 2017-06-30 NOTE — PROGRESS NOTES
Pt presents today for prolia inj. Verified w/full name and . Prolia administered to right upper arm. Pt tolerated injection well.

## 2017-08-22 ENCOUNTER — OFFICE VISIT (OUTPATIENT)
Dept: PULMONOLOGY | Facility: CLINIC | Age: 82
End: 2017-08-22

## 2017-08-22 VITALS
HEART RATE: 64 BPM | BODY MASS INDEX: 21.02 KG/M2 | SYSTOLIC BLOOD PRESSURE: 145 MMHG | OXYGEN SATURATION: 98 % | DIASTOLIC BLOOD PRESSURE: 70 MMHG | WEIGHT: 118.63 LBS | HEIGHT: 63 IN | RESPIRATION RATE: 18 BRPM

## 2017-08-22 DIAGNOSIS — R91.1 PULMONARY NODULE: Primary | ICD-10-CM

## 2017-08-22 PROCEDURE — 99204 OFFICE O/P NEW MOD 45 MIN: CPT | Performed by: INTERNAL MEDICINE

## 2017-08-22 PROCEDURE — G0463 HOSPITAL OUTPT CLINIC VISIT: HCPCS | Performed by: INTERNAL MEDICINE

## 2017-08-22 NOTE — PROGRESS NOTES
Dear  Ayesha Dominguez  :           As you know, Arlette Farmer is an 25-year-old female who I am now evaluating for pulmonary nodule. HISTORY OF PRESENT ILLNESS: The patient is a lifetime non-smoker.   She had a pneumonia syndrome in January 2017 and there was suggesti superior segment of the right lower lobe. ASSESSMENT AND PLAN:  PROBLEM 1.  Pulmonary nodularities–1 right upper lobe nodule and a groundglass opacity in the superior segment of the right lower lobe.   The patient is a lifetime non-smoker and is asymptom

## 2017-09-15 ENCOUNTER — HOSPITAL ENCOUNTER (OUTPATIENT)
Dept: CT IMAGING | Facility: HOSPITAL | Age: 82
Discharge: HOME | End: 2017-09-15
Attending: INTERNAL MEDICINE
Payer: MEDICARE

## 2017-09-15 DIAGNOSIS — R91.1 PULMONARY NODULE: ICD-10-CM

## 2017-09-15 PROCEDURE — 71250 CT THORAX DX C-: CPT | Performed by: INTERNAL MEDICINE

## 2017-10-12 ENCOUNTER — OFFICE VISIT (OUTPATIENT)
Dept: INTERNAL MEDICINE CLINIC | Facility: CLINIC | Age: 82
End: 2017-10-12

## 2017-10-12 VITALS
DIASTOLIC BLOOD PRESSURE: 75 MMHG | HEIGHT: 62 IN | TEMPERATURE: 98 F | SYSTOLIC BLOOD PRESSURE: 148 MMHG | WEIGHT: 118 LBS | BODY MASS INDEX: 21.71 KG/M2 | HEART RATE: 80 BPM

## 2017-10-12 DIAGNOSIS — Z86.006 HISTORY OF MELANOMA IN SITU: ICD-10-CM

## 2017-10-12 DIAGNOSIS — L82.0 INFLAMED SEBORRHEIC KERATOSIS: ICD-10-CM

## 2017-10-12 DIAGNOSIS — E55.9 VITAMIN D DEFICIENCY DISEASE: ICD-10-CM

## 2017-10-12 DIAGNOSIS — Z00.00 PHYSICAL EXAM, ANNUAL: Primary | ICD-10-CM

## 2017-10-12 DIAGNOSIS — L57.0 ACTINIC KERATOSIS: ICD-10-CM

## 2017-10-12 DIAGNOSIS — K21.9 GASTRIC REFLUX SYNDROME: ICD-10-CM

## 2017-10-12 DIAGNOSIS — J37.0 CHRONIC LARYNGITIS: ICD-10-CM

## 2017-10-12 DIAGNOSIS — R91.1 PULMONARY NODULE: ICD-10-CM

## 2017-10-12 DIAGNOSIS — M81.0 AGE-RELATED OSTEOPOROSIS WITHOUT CURRENT PATHOLOGICAL FRACTURE: ICD-10-CM

## 2017-10-12 DIAGNOSIS — J30.9 CHRONIC ALLERGIC RHINITIS, UNSPECIFIED SEASONALITY, UNSPECIFIED TRIGGER: ICD-10-CM

## 2017-10-12 DIAGNOSIS — L72.0 MILIA: ICD-10-CM

## 2017-10-12 PROBLEM — J18.9 COMMUNITY ACQUIRED PNEUMONIA: Status: RESOLVED | Noted: 2017-01-30 | Resolved: 2017-10-12

## 2017-10-12 PROBLEM — Z12.31 ENCOUNTER FOR SCREENING MAMMOGRAM FOR MALIGNANT NEOPLASM OF BREAST: Status: RESOLVED | Noted: 2017-06-01 | Resolved: 2017-10-12

## 2017-10-12 PROBLEM — R09.02 HYPOXIA: Status: RESOLVED | Noted: 2017-01-30 | Resolved: 2017-10-12

## 2017-10-12 PROBLEM — J20.9 ACUTE BRONCHITIS: Status: RESOLVED | Noted: 2017-01-28 | Resolved: 2017-10-12

## 2017-10-12 PROBLEM — J06.9 ACUTE UPPER RESPIRATORY INFECTION: Status: RESOLVED | Noted: 2017-01-25 | Resolved: 2017-10-12

## 2017-10-12 PROCEDURE — G0008 ADMIN INFLUENZA VIRUS VAC: HCPCS | Performed by: INTERNAL MEDICINE

## 2017-10-12 PROCEDURE — 90653 IIV ADJUVANT VACCINE IM: CPT | Performed by: INTERNAL MEDICINE

## 2017-10-12 PROCEDURE — G0439 PPPS, SUBSEQ VISIT: HCPCS | Performed by: INTERNAL MEDICINE

## 2017-10-12 RX ORDER — PHYTONADIONE (VIT K1) 100 MCG
TABLET ORAL
COMMUNITY
End: 2021-06-15 | Stop reason: ALTCHOICE

## 2017-10-13 NOTE — PROGRESS NOTES
HPI:   Sherren Holly is a 80year old female who presents for a Medicare Initial Annual Wellness visit (Once after 12 month Medicare anniversary) .     Patient reports that she has been feeling well, she has no concerns, reports that laryngitis that she h Tab Take  by mouth. Calcium 500 + D 500 mg (1,250 mg)-200 unit tablet   Multiple Vitamins-Minerals (CENTRUM SILVER) Oral Tab Take  by mouth. take 1 tablet by oral route  every day   Omega-3 Fatty Acids (RA FISH OIL) 1000 MG Oral Cap Take  by mouth.  FISH OI drainage,    EXAM:   /75 (BP Location: Right arm, Patient Position: Sitting, Cuff Size: adult)   Pulse 80   Temp 98.1 °F (36.7 °C) (Oral)   Ht 5' 2\" (1.575 m)   Wt 118 lb (53.5 kg)   BMI 21.58 kg/m²  Estimated body mass index is 21.58 kg/m² as calcu Nares normal, septum midline,mucosa normal, no drainage or sinus tenderness   Throat: Lips, mucosa, and tongue normal; teeth and gums normal   Neck: Supple, symmetrical, trachea midline, no adenopathy;  thyroid: not enlarged, symmetric, no tenderness/mass/ 6 months, check labs prior to every injection, order placed    Milia stable, patient will follow up with dermatology    Actinic keratosis stable follow-up periodically with dermatology    Inflamed seborrheic keratosis stable see dermatology as needed  Pulm Able without help    Dressing: Able without help    Eating: Able without help    Driving: Able without help    Preparing your meals: Able without help    Managing money/bills: Able without help    Taking medications as prescribed: Able without help    Are Annually No results found for: A1C No flowsheet data found.     Fasting Blood Sugar (FSB)Annually   Glucose (mg/dL)   Date Value   06/01/2017 91   ----------  GLUCOSE (P) (mg/dL)   Date Value   09/10/2016 87   ----------       Cardiovascular Disease Screeni your 65th birthday    Hepatitis B for Moderate/High Risk No vaccine history found Medium/high risk factors:   End-stage renal disease   Hemophiliacs who received Factor VIII or IX concentrates   Clients of institutions for the mentally retarded   Persons w

## 2017-11-16 ENCOUNTER — TELEPHONE (OUTPATIENT)
Dept: INTERNAL MEDICINE CLINIC | Facility: CLINIC | Age: 82
End: 2017-11-16

## 2017-11-16 NOTE — TELEPHONE ENCOUNTER
Insurance verification completed on 9/14/17. Pt has Medicare and [employer] PPO with full coverage.  Pt due on 12/30/17

## 2017-12-04 ENCOUNTER — OFFICE VISIT (OUTPATIENT)
Dept: DERMATOLOGY CLINIC | Facility: CLINIC | Age: 82
End: 2017-12-04

## 2017-12-04 DIAGNOSIS — D23.30 BENIGN NEOPLASM OF SKIN OF FACE: ICD-10-CM

## 2017-12-04 DIAGNOSIS — Z86.006 HISTORY OF MELANOMA IN SITU: Primary | ICD-10-CM

## 2017-12-04 DIAGNOSIS — D23.70 BENIGN NEOPLASM OF SKIN OF LOWER LIMB, INCLUDING HIP, UNSPECIFIED LATERALITY: ICD-10-CM

## 2017-12-04 DIAGNOSIS — D23.5 BENIGN NEOPLASM OF SKIN OF TRUNK, EXCEPT SCROTUM: ICD-10-CM

## 2017-12-04 DIAGNOSIS — D23.60 BENIGN NEOPLASM OF SKIN OF UPPER LIMB, INCLUDING SHOULDER, UNSPECIFIED LATERALITY: ICD-10-CM

## 2017-12-04 DIAGNOSIS — L72.0 MILIA: ICD-10-CM

## 2017-12-04 DIAGNOSIS — L82.1 SEBORRHEIC KERATOSES: ICD-10-CM

## 2017-12-04 DIAGNOSIS — L81.4 SOLAR LENTIGO: ICD-10-CM

## 2017-12-04 DIAGNOSIS — L72.0 EPIDERMAL CYST: ICD-10-CM

## 2017-12-04 DIAGNOSIS — D23.4 BENIGN NEOPLASM OF SCALP AND SKIN OF NECK: ICD-10-CM

## 2017-12-04 PROCEDURE — 99214 OFFICE O/P EST MOD 30 MIN: CPT | Performed by: DERMATOLOGY

## 2017-12-04 PROCEDURE — G0463 HOSPITAL OUTPT CLINIC VISIT: HCPCS | Performed by: DERMATOLOGY

## 2017-12-04 NOTE — PROGRESS NOTES
l HPI:     Chief Complaint     Full Skin Exam        HPI     Full Skin Exam    Additional comments: LOV 06/12/17 pt was seen for full body check pt here today for full body check states she has no new concerns at this time, pt has personal Hx of Melanoma - (unknown strength) Disp:  Rfl:    latanoprost (XALATAN) 0.005 % Ophthalmic Solution Place 1 drop into both eyes nightly. Disp:  Rfl:    Fluticasone Propionate 50 MCG/ACT Nasal Suspension 2 sprays by Each Nare route daily.  Disp: 3 Bottle Rfl: 0   denosuma extremity and right lower extremity. Also exam of scalp, as well as cervical, axillary, supraclavicular, and inguinal lymph nodes. The patient is alert, oriented, and appears their stated age.   Patient is well nourished and in no distress    The exam w every few hours.     Benign neoplasm of skin of face  Benign neoplasm of skin of upper limb, including shoulder, unspecified laterality  Benign neoplasm of skin of lower limb, including hip, unspecified laterality  Benign neoplasm of scalp and skin of neck

## 2018-01-16 ENCOUNTER — OFFICE VISIT (OUTPATIENT)
Dept: DERMATOLOGY CLINIC | Facility: CLINIC | Age: 83
End: 2018-01-16

## 2018-01-16 DIAGNOSIS — L72.0 MILIA: Primary | ICD-10-CM

## 2018-01-16 PROCEDURE — 17110 DESTRUCTION B9 LES UP TO 14: CPT | Performed by: DERMATOLOGY

## 2018-01-16 NOTE — PROGRESS NOTES
HPI:     Chief Complaint     Derm Problem        HPI     Derm Problem    Additional comments:  Pt presenting today for Milia removal to face        Last edited by Jennifer Jeter, [address] on 1/16/2018  8:08 AM. 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 Patient tolerated procedure well. Post treatment directions given.   Patient will be due for follow-up in 6 months for her full body exam

## 2018-01-16 NOTE — PROGRESS NOTES
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

## 2018-01-25 ENCOUNTER — OFFICE VISIT (OUTPATIENT)
Dept: PODIATRY CLINIC | Facility: CLINIC | Age: 83
End: 2018-01-25

## 2018-01-25 DIAGNOSIS — M79.674 PAIN OF TOE OF RIGHT FOOT: Primary | ICD-10-CM

## 2018-01-25 DIAGNOSIS — B35.1 ONYCHOMYCOSIS: ICD-10-CM

## 2018-01-25 DIAGNOSIS — M79.675 PAIN IN TOE OF LEFT FOOT: ICD-10-CM

## 2018-01-25 PROCEDURE — 11721 DEBRIDE NAIL 6 OR MORE: CPT | Performed by: PODIATRIST

## 2018-01-25 NOTE — PROGRESS NOTES
HPI:    Patient ID: Joshua Gamez is a 80year old female. HPI  This 35-year-old female presents with recurrent pain associated with her toenails. She reports having relief by previous debridement.   Review of Systems  I did review present medical sta reduced nail, subungual debris, and some keratosis. No ulcerations or infections were encountered.   I anticipate relief will see patient for care if and when symptoms redevelop         ASSESSMENT/PLAN:   Pain of toe of right foot  (primary encounter diagn

## 2018-02-19 ENCOUNTER — NURSE ONLY (OUTPATIENT)
Dept: INTERNAL MEDICINE CLINIC | Facility: CLINIC | Age: 83
End: 2018-02-19

## 2018-02-19 DIAGNOSIS — M81.0 OSTEOPOROSIS, UNSPECIFIED OSTEOPOROSIS TYPE, UNSPECIFIED PATHOLOGICAL FRACTURE PRESENCE: Primary | ICD-10-CM

## 2018-02-19 PROCEDURE — 96372 THER/PROPH/DIAG INJ SC/IM: CPT | Performed by: INTERNAL MEDICINE

## 2018-02-19 NOTE — PROGRESS NOTES
Pt. Came in for a Prolia inj. It was prior [facility]. In 2017. This was her first one from this [facility]. And I wrote the next one down for 2018. Name,  and allergies were verified. Given in rt. Upper arm.   Pt. Tolerated well

## 2018-02-21 ENCOUNTER — TELEPHONE (OUTPATIENT)
Dept: GASTROENTEROLOGY | Facility: CLINIC | Age: 83
End: 2018-02-21

## 2018-02-21 NOTE — TELEPHONE ENCOUNTER
I believe the patient's sister had ovarian cancer which was metastatic to the colon. She is not at increased risk for colon cancer and I would not recommend a screening colonoscopy at the age of 80.   I would be happy to discuss with the patient in the off

## 2018-02-21 NOTE — TELEPHONE ENCOUNTER
Spoke to pt, she is not sure when her next procedure is due. Denies any symptoms but concerned because sister had colon and ovarian cancer. 2016 OV w/ Dr. Caleb Fleming below:  1. Maintain high-fiber diet. 2.  Please contact me if you have recurrent symptoms.

## 2018-02-21 NOTE — TELEPHONE ENCOUNTER
Spoke to pt, relayed Dr. Ezra Spangler message as shown below. Pt verbalized understanding and had no further questions or concerns at this time.

## 2018-03-06 ENCOUNTER — PATIENT MESSAGE (OUTPATIENT)
Dept: INTERNAL MEDICINE CLINIC | Facility: CLINIC | Age: 83
End: 2018-03-06

## 2018-03-07 NOTE — TELEPHONE ENCOUNTER
From: Joshua Gamez  To: Zakiya Clarke MD  Sent: 3/6/2018 1:49 PM CST  Subject: Other    When did I have my last manogram, blood test and bone density test

## 2018-05-06 ENCOUNTER — APPOINTMENT (OUTPATIENT)
Dept: LAB | Facility: HOSPITAL | Age: 83
End: 2018-05-06
Attending: INTERNAL MEDICINE
Payer: MEDICARE

## 2018-05-06 DIAGNOSIS — J37.0 CHRONIC LARYNGITIS: ICD-10-CM

## 2018-05-06 DIAGNOSIS — E55.9 VITAMIN D DEFICIENCY DISEASE: ICD-10-CM

## 2018-05-06 DIAGNOSIS — R91.1 PULMONARY NODULE: ICD-10-CM

## 2018-05-06 DIAGNOSIS — L82.0 INFLAMED SEBORRHEIC KERATOSIS: ICD-10-CM

## 2018-05-06 DIAGNOSIS — K21.9 GASTRIC REFLUX SYNDROME: ICD-10-CM

## 2018-05-06 DIAGNOSIS — L57.0 ACTINIC KERATOSIS: ICD-10-CM

## 2018-05-06 DIAGNOSIS — L72.0 MILIA: ICD-10-CM

## 2018-05-06 DIAGNOSIS — Z00.00 PHYSICAL EXAM, ANNUAL: ICD-10-CM

## 2018-05-06 DIAGNOSIS — M81.0 AGE-RELATED OSTEOPOROSIS WITHOUT CURRENT PATHOLOGICAL FRACTURE: ICD-10-CM

## 2018-05-06 DIAGNOSIS — J30.9 CHRONIC ALLERGIC RHINITIS: ICD-10-CM

## 2018-05-06 DIAGNOSIS — Z86.006 HISTORY OF MELANOMA IN SITU: ICD-10-CM

## 2018-05-06 PROCEDURE — 82306 VITAMIN D 25 HYDROXY: CPT

## 2018-05-06 PROCEDURE — 36415 COLL VENOUS BLD VENIPUNCTURE: CPT

## 2018-05-06 PROCEDURE — 80053 COMPREHEN METABOLIC PANEL: CPT

## 2018-05-10 ENCOUNTER — TELEPHONE (OUTPATIENT)
Dept: INTERNAL MEDICINE CLINIC | Facility: CLINIC | Age: 83
End: 2018-05-10

## 2018-05-10 NOTE — TELEPHONE ENCOUNTER
Please approve Prolia injection 60 mg subcutaneously if needed, I believe we do that every 6 months and call patient when approved to get injection, let him know that I do not see patients at [facility] anymore

## 2018-05-10 NOTE — TELEPHONE ENCOUNTER
[facility] spoke with rep Rebeca Alcaraz, benefit verification initiated response time up to 5 days.

## 2018-05-11 ENCOUNTER — TELEPHONE (OUTPATIENT)
Dept: INTERNAL MEDICINE CLINIC | Facility: CLINIC | Age: 83
End: 2018-05-11

## 2018-05-11 NOTE — TELEPHONE ENCOUNTER
Prolia summary of benefits received medication is covered there is no deductible remaining but patient may be subject to a 20% co-insurance for the administration and cost of Prolia.  Patient scheduled for 5/17/2018 at 2pm.

## 2018-05-11 NOTE — TELEPHONE ENCOUNTER
Spoke with pt. Pt. Is asking if she can have pneumonia vaccine at the same time with Prolia injection. Informed pt. I will call her back MOnday when I`m at [facility] location to check if Prolia is available and if ok with Nk. Please advise.

## 2018-05-11 NOTE — TELEPHONE ENCOUNTER
Pt has a nurse visit next Thursday 5-17 Prolia injection  Pt also asking to have an order for phenomena injection  Please advise

## 2018-05-14 NOTE — TELEPHONE ENCOUNTER
I preferthta she does  Vaccine 1  Month  After having prolia injection, please call pt, pt  ussually goes for Prolia to  [facility]

## 2018-05-17 ENCOUNTER — NURSE ONLY (OUTPATIENT)
Dept: INTERNAL MEDICINE CLINIC | Facility: CLINIC | Age: 83
End: 2018-05-17

## 2018-05-17 ENCOUNTER — TELEPHONE (OUTPATIENT)
Dept: INTERNAL MEDICINE CLINIC | Facility: CLINIC | Age: 83
End: 2018-05-17

## 2018-05-17 NOTE — TELEPHONE ENCOUNTER
Pt presented today to the [facility] for Prolia injection. Pt was last given the injection 02/19/18, making it too soon. Pt stated that since she was here could she get the pneumonia vaccine.  Pt stated that she was having some runny nose and allergies an

## 2018-05-19 ENCOUNTER — HOSPITAL ENCOUNTER (EMERGENCY)
Facility: HOSPITAL | Age: 83
Discharge: HOME | End: 2018-05-19
Attending: EMERGENCY MEDICINE
Payer: MEDICARE

## 2018-05-19 ENCOUNTER — APPOINTMENT (OUTPATIENT)
Dept: GENERAL RADIOLOGY | Facility: HOSPITAL | Age: 83
End: 2018-05-19
Attending: EMERGENCY MEDICINE
Payer: MEDICARE

## 2018-05-19 VITALS
WEIGHT: 118 LBS | HEIGHT: 63 IN | TEMPERATURE: 98 F | SYSTOLIC BLOOD PRESSURE: 146 MMHG | RESPIRATION RATE: 20 BRPM | BODY MASS INDEX: 20.91 KG/M2 | DIASTOLIC BLOOD PRESSURE: 75 MMHG | HEART RATE: 101 BPM | OXYGEN SATURATION: 97 %

## 2018-05-19 DIAGNOSIS — S62.636A CLOSED DISPLACED FRACTURE OF DISTAL PHALANX OF RIGHT LITTLE FINGER, INITIAL ENCOUNTER: Primary | ICD-10-CM

## 2018-05-19 DIAGNOSIS — S00.83XA CONTUSION OF FACE, INITIAL ENCOUNTER: ICD-10-CM

## 2018-05-19 DIAGNOSIS — M20.011 MALLET FINGER OF RIGHT HAND: ICD-10-CM

## 2018-05-19 PROCEDURE — 99283 EMERGENCY DEPT VISIT LOW MDM: CPT

## 2018-05-19 PROCEDURE — 73140 X-RAY EXAM OF FINGER(S): CPT | Performed by: EMERGENCY MEDICINE

## 2018-05-19 PROCEDURE — 26750 TREAT FINGER FRACTURE EACH: CPT

## 2018-05-19 NOTE — ED PROVIDER NOTES
Patient Seen in: [facility] Emergency Department    History   Patient presents with:  Upper Extremity Injury (musculoskeletal)    Stated Complaint: tripped over hose, right hand injury    HPI    It is an 80-year-old female who presents to the [facility] Eyes: Conjunctivae and EOM are normal. Pupils are equal, round, and reactive to light. Neck: Neck supple. Cardiovascular: Normal rate, regular rhythm and normal heart sounds. Pulmonary/Chest: Effort normal.   Musculoskeletal: Normal range of motion.

## 2018-05-29 ENCOUNTER — OFFICE VISIT (OUTPATIENT)
Dept: FAMILY MEDICINE CLINIC | Facility: CLINIC | Age: 83
End: 2018-05-29

## 2018-05-29 VITALS
HEART RATE: 70 BPM | TEMPERATURE: 99 F | WEIGHT: 121 LBS | DIASTOLIC BLOOD PRESSURE: 73 MMHG | SYSTOLIC BLOOD PRESSURE: 126 MMHG | BODY MASS INDEX: 21 KG/M2

## 2018-05-29 DIAGNOSIS — Z23 NEED FOR VACCINATION: ICD-10-CM

## 2018-05-29 DIAGNOSIS — S62.636D CLOSED DISPLACED FRACTURE OF DISTAL PHALANX OF RIGHT LITTLE FINGER WITH ROUTINE HEALING, SUBSEQUENT ENCOUNTER: Primary | ICD-10-CM

## 2018-05-29 PROCEDURE — G0463 HOSPITAL OUTPT CLINIC VISIT: HCPCS | Performed by: PHYSICIAN ASSISTANT

## 2018-05-29 PROCEDURE — G0009 ADMIN PNEUMOCOCCAL VACCINE: HCPCS | Performed by: PHYSICIAN ASSISTANT

## 2018-05-29 PROCEDURE — 90670 PCV13 VACCINE IM: CPT | Performed by: PHYSICIAN ASSISTANT

## 2018-05-29 PROCEDURE — 99213 OFFICE O/P EST LOW 20 MIN: CPT | Performed by: PHYSICIAN ASSISTANT

## 2018-05-29 NOTE — PROGRESS NOTES
HPI:    Patient ID: Sukhjinder Brennan is a 80year old female. Patient presents for follow up from ER visit 5/19/18 for fracture of distal phalanx of right fifth finger.   She also sustained contusion to her upper lip after tripping on garden hose and fall Calcium Carbonate-Vitamin D (CALCIUM 500/D) 500-200 MG-UNIT Oral Tab Take  by mouth. Calcium 500 + D 500 mg (1,250 mg)-200 unit tablet Disp:  Rfl:    Multiple Vitamins-Minerals (CENTRUM SILVER) Oral Tab Take  by mouth.  take 1 tablet by oral route  every da

## 2018-06-04 ENCOUNTER — OFFICE VISIT (OUTPATIENT)
Dept: DERMATOLOGY CLINIC | Facility: CLINIC | Age: 83
End: 2018-06-04

## 2018-06-04 ENCOUNTER — OFFICE VISIT (OUTPATIENT)
Dept: SURGERY | Facility: CLINIC | Age: 83
End: 2018-06-04

## 2018-06-04 DIAGNOSIS — L81.4 SOLAR LENTIGO: ICD-10-CM

## 2018-06-04 DIAGNOSIS — S62.636A DISPLACED FRACTURE OF DISTAL PHALANX OF RIGHT LITTLE FINGER, INITIAL ENCOUNTER FOR CLOSED FRACTURE: ICD-10-CM

## 2018-06-04 DIAGNOSIS — M20.011 ACQUIRED MALLET DEFORMITY OF RIGHT LITTLE FINGER: Primary | ICD-10-CM

## 2018-06-04 DIAGNOSIS — D23.5 BENIGN NEOPLASM OF SKIN OF TRUNK, EXCEPT SCROTUM: ICD-10-CM

## 2018-06-04 DIAGNOSIS — Z86.006 HISTORY OF MELANOMA IN SITU: Primary | ICD-10-CM

## 2018-06-04 DIAGNOSIS — L72.0 MILIA: ICD-10-CM

## 2018-06-04 DIAGNOSIS — D23.70 BENIGN NEOPLASM OF SKIN OF LOWER LIMB, INCLUDING HIP, UNSPECIFIED LATERALITY: ICD-10-CM

## 2018-06-04 DIAGNOSIS — D23.4 BENIGN NEOPLASM OF SCALP AND SKIN OF NECK: ICD-10-CM

## 2018-06-04 DIAGNOSIS — D23.30 BENIGN NEOPLASM OF SKIN OF FACE: ICD-10-CM

## 2018-06-04 DIAGNOSIS — L82.1 SEBORRHEIC KERATOSES: ICD-10-CM

## 2018-06-04 DIAGNOSIS — D23.60 BENIGN NEOPLASM OF SKIN OF UPPER LIMB, INCLUDING SHOULDER, UNSPECIFIED LATERALITY: ICD-10-CM

## 2018-06-04 PROCEDURE — 17110 DESTRUCTION B9 LES UP TO 14: CPT | Performed by: DERMATOLOGY

## 2018-06-04 PROCEDURE — 99214 OFFICE O/P EST MOD 30 MIN: CPT | Performed by: DERMATOLOGY

## 2018-06-04 PROCEDURE — 99203 OFFICE O/P NEW LOW 30 MIN: CPT | Performed by: PLASTIC SURGERY

## 2018-06-04 PROCEDURE — G0463 HOSPITAL OUTPT CLINIC VISIT: HCPCS | Performed by: PLASTIC SURGERY

## 2018-06-04 NOTE — PROGRESS NOTES
HPI:     Chief Complaint     Full Skin Exam        HPI     Full Skin Exam    Additional comments: LOV 01/16/18 pt seen for removal of milia to the face pt here for 6 mo full body check has no new spots of concern, pts sister had Hx of skin CA pt also has H OIL (unknown strength) Disp:  Rfl:    latanoprost (XALATAN) 0.005 % Ophthalmic Solution Place 1 drop into both eyes nightly.    Disp:  Rfl:      Allergies:   No Known Allergies    Past Medical History:   Diagnosis Date   • Atypical nevus 2012    skin of rig upper extremity, buttocks, genital area, l lower extremity and right lower extremity. Also exam of scalp, as well as cervical, axillary, inguinal, and supraclavicular lymph nodes    The patient is alert, oriented, and appears their stated age.   Patient is higher, hats, discussed-  Benign neoplasm of skin of face  Benign neoplasm of skin of upper limb, including shoulder, unspecified laterality  Benign neoplasm of skin of lower limb, including hip, unspecified laterality  Benign neoplasm of scalp and skin of

## 2018-06-04 NOTE — H&P
Injury 1: Closed displaced Fx DP RSF  - Date: 05/19/18  - Days Since: 12    Powhatan Janina is a 80year old female that presents with Patient presents with:  Fracture: RSF   .     REFERRED BY:  Yamileth Maza    Pacemaker: No  Latex Allergy: no  Coumadin: Calcium 500 + D 500 mg (1,250 mg)-200 unit tablet Disp:  Rfl:    Multiple Vitamins-Minerals (CENTRUM SILVER) Oral Tab Take  by mouth. take 1 tablet by oral route  every day Disp:  Rfl:    Omega-3 Fatty Acids (RA FISH OIL) 1000 MG Oral Cap Take  by mouth.  Umesh Roller type   • Colon Cancer Sister    • Ovarian Cancer Sister    • Breast Cancer Sister    • Colon Cancer Other      Family H/o       PHYSICAL EXAM:   CONSTITUTIONAL: Overall appearance - Normal  HEENT: Normocephalic  EYES: Conjunctiva - Right: Normal, Left: Nor

## 2018-07-02 ENCOUNTER — OFFICE VISIT (OUTPATIENT)
Dept: SURGERY | Facility: CLINIC | Age: 83
End: 2018-07-02

## 2018-07-02 DIAGNOSIS — S62.636A DISPLACED FRACTURE OF DISTAL PHALANX OF RIGHT LITTLE FINGER, INITIAL ENCOUNTER FOR CLOSED FRACTURE: ICD-10-CM

## 2018-07-02 DIAGNOSIS — M62.81 DISTAL MUSCLE WEAKNESS: ICD-10-CM

## 2018-07-02 DIAGNOSIS — M20.011 ACQUIRED MALLET DEFORMITY OF RIGHT LITTLE FINGER: Primary | ICD-10-CM

## 2018-07-02 DIAGNOSIS — M25.641 JOINT STIFFNESS OF HAND, RIGHT: Primary | ICD-10-CM

## 2018-07-02 PROCEDURE — G8987 SELF CARE CURRENT STATUS: HCPCS | Performed by: OCCUPATIONAL THERAPIST

## 2018-07-02 PROCEDURE — 99213 OFFICE O/P EST LOW 20 MIN: CPT | Performed by: PLASTIC SURGERY

## 2018-07-02 PROCEDURE — G8988 SELF CARE GOAL STATUS: HCPCS | Performed by: OCCUPATIONAL THERAPIST

## 2018-07-02 PROCEDURE — 29130 APPL FINGER SPLINT STATIC: CPT | Performed by: OCCUPATIONAL THERAPIST

## 2018-07-02 PROCEDURE — 97110 THERAPEUTIC EXERCISES: CPT | Performed by: OCCUPATIONAL THERAPIST

## 2018-07-02 PROCEDURE — 97166 OT EVAL MOD COMPLEX 45 MIN: CPT | Performed by: OCCUPATIONAL THERAPIST

## 2018-07-02 PROCEDURE — G0463 HOSPITAL OUTPT CLINIC VISIT: HCPCS | Performed by: PLASTIC SURGERY

## 2018-07-02 NOTE — PROGRESS NOTES
Injury 1: RSF mallestelita with fracture  - Date: 05/19/18  - Days Since: 44  \"Doing well\"  Denies pain, numbness, tingling and need for analgesics. Wearing stack splint on RSF at all times except to bathe. Slight generalized edema RSF.     44 days post injur

## 2018-07-02 NOTE — PROGRESS NOTES
OTherapeutic Exercise  Patient/Family Education  Splinting: OCCUPATIONAL THERAPY EVALUATION:   Dahiana Nur   [id number]       SUBJECTIVE:    HX of Injury: Eugenie Leash over a garden hose, that resulted in a RSF Avulsion fracture.   Chief Complaint:   Without co be seen 2 x /week for 3 weeks or a total of 6 visits. Pt. was advised regarding the findings of this evaluation and agrees to the plan of care.        G codes:   R1380IY    [facility]          [id number]    [address]     I have reviewed the treatment

## 2018-07-09 ENCOUNTER — OFFICE VISIT (OUTPATIENT)
Dept: SURGERY | Facility: CLINIC | Age: 83
End: 2018-07-09

## 2018-07-09 DIAGNOSIS — M25.641 JOINT STIFFNESS OF HAND, RIGHT: Primary | ICD-10-CM

## 2018-07-09 DIAGNOSIS — M62.81 DISTAL MUSCLE WEAKNESS: ICD-10-CM

## 2018-07-09 PROCEDURE — 97110 THERAPEUTIC EXERCISES: CPT | Performed by: OCCUPATIONAL THERAPIST

## 2018-07-09 NOTE — PROGRESS NOTES
Subjective:  I will be glad to get rid of this splint.       Objective:     Current level of performance:  ADL: Independent with all self care, driving, leisure time tasks  Work: Retired  Leisure: Exercise, family    Measurements/Tests:  ROM:  Testing By: p

## 2018-07-23 ENCOUNTER — OFFICE VISIT (OUTPATIENT)
Dept: SURGERY | Facility: CLINIC | Age: 83
End: 2018-07-23
Payer: MEDICARE

## 2018-07-23 DIAGNOSIS — S62.636A DISPLACED FRACTURE OF DISTAL PHALANX OF RIGHT LITTLE FINGER, INITIAL ENCOUNTER FOR CLOSED FRACTURE: ICD-10-CM

## 2018-07-23 DIAGNOSIS — M62.81 DISTAL MUSCLE WEAKNESS: ICD-10-CM

## 2018-07-23 DIAGNOSIS — M25.642 STIFFNESS OF JOINT, HAND, LEFT: Primary | ICD-10-CM

## 2018-07-23 DIAGNOSIS — M20.011 ACQUIRED MALLET DEFORMITY OF RIGHT LITTLE FINGER: Primary | ICD-10-CM

## 2018-07-23 PROCEDURE — 97110 THERAPEUTIC EXERCISES: CPT | Performed by: OCCUPATIONAL THERAPIST

## 2018-07-23 PROCEDURE — 99212 OFFICE O/P EST SF 10 MIN: CPT | Performed by: PLASTIC SURGERY

## 2018-07-23 PROCEDURE — G0463 HOSPITAL OUTPT CLINIC VISIT: HCPCS | Performed by: PLASTIC SURGERY

## 2018-07-23 NOTE — PROGRESS NOTES
Subjective: I have stopped wearing the LSF splint. Objective:     Current level of performance:  ADL: Independent  Work: Not applicable.   Leisure: Not addressed    Measurements/Tests:  ROM:  Testing By: mariam DYKES Small Right: -10/30            Treatmen

## 2018-07-23 NOTE — PROGRESS NOTES
Injury 1: RSF kenneth with fracture  - Date: 05/19/18  - Days Since: 65  \"Doing well\"  Denies pain, numbness, tingling and need for analgesics. Stopped wearing splint 7/20/18. Generalized edema. Stiffness. 65 days post injury. No complaints.   No pa

## 2018-07-30 ENCOUNTER — OFFICE VISIT (OUTPATIENT)
Dept: SURGERY | Facility: CLINIC | Age: 83
End: 2018-07-30
Payer: MEDICARE

## 2018-07-30 DIAGNOSIS — M62.81 DISTAL MUSCLE WEAKNESS: ICD-10-CM

## 2018-07-30 DIAGNOSIS — M25.641 JOINT STIFFNESS OF HAND, RIGHT: Primary | ICD-10-CM

## 2018-07-30 PROCEDURE — 97018 PARAFFIN BATH THERAPY: CPT | Performed by: OCCUPATIONAL THERAPIST

## 2018-07-30 PROCEDURE — 97110 THERAPEUTIC EXERCISES: CPT | Performed by: OCCUPATIONAL THERAPIST

## 2018-07-30 NOTE — PROGRESS NOTES
Subjective:  I can do everything I need to do with my right hand. Objective:     Current level of performance:  ADL: Independent with all self care task needs.   Work: Retired  Leisure: Family    Measurements/Tests:  ROM:  Testing By: mariam LUCAS [facility]

## 2018-08-13 ENCOUNTER — OFFICE VISIT (OUTPATIENT)
Dept: SURGERY | Facility: CLINIC | Age: 83
End: 2018-08-13
Payer: MEDICARE

## 2018-08-13 DIAGNOSIS — M62.81 DISTAL MUSCLE WEAKNESS: ICD-10-CM

## 2018-08-13 DIAGNOSIS — M25.641 JOINT STIFFNESS OF HAND, RIGHT: Primary | ICD-10-CM

## 2018-08-13 PROCEDURE — 97110 THERAPEUTIC EXERCISES: CPT | Performed by: OCCUPATIONAL THERAPIST

## 2018-08-13 PROCEDURE — 97018 PARAFFIN BATH THERAPY: CPT | Performed by: OCCUPATIONAL THERAPIST

## 2018-08-13 NOTE — PROGRESS NOTES
Subjective: I have to be truthful with you, I have not been doing my exercises. Objective:     Current level of performance:  ADL: Independent with all self care task needs.   Work: Retired  Leisure: Family    Measurements/Tests:  ROM:  Testing By: Debora Wong

## 2018-08-14 ENCOUNTER — TELEPHONE (OUTPATIENT)
Dept: INTERNAL MEDICINE CLINIC | Facility: CLINIC | Age: 83
End: 2018-08-14

## 2018-08-14 NOTE — TELEPHONE ENCOUNTER
Pt requesting RN/LOM appt for Prolia injection    Requesting 8/20 appt between  8:30a-9:00a          Please reply to pool: EM [facility]

## 2018-08-20 ENCOUNTER — NURSE ONLY (OUTPATIENT)
Dept: INTERNAL MEDICINE CLINIC | Facility: CLINIC | Age: 83
End: 2018-08-20
Payer: MEDICARE

## 2018-08-20 ENCOUNTER — OFFICE VISIT (OUTPATIENT)
Dept: SURGERY | Facility: CLINIC | Age: 83
End: 2018-08-20
Payer: MEDICARE

## 2018-08-20 ENCOUNTER — TELEPHONE (OUTPATIENT)
Dept: PULMONOLOGY | Facility: CLINIC | Age: 83
End: 2018-08-20

## 2018-08-20 DIAGNOSIS — M62.81 DISTAL MUSCLE WEAKNESS: ICD-10-CM

## 2018-08-20 DIAGNOSIS — M81.0 AGE-RELATED OSTEOPOROSIS WITHOUT CURRENT PATHOLOGICAL FRACTURE: Primary | ICD-10-CM

## 2018-08-20 DIAGNOSIS — M25.641 JOINT STIFFNESS OF HAND, RIGHT: Primary | ICD-10-CM

## 2018-08-20 PROCEDURE — 97110 THERAPEUTIC EXERCISES: CPT | Performed by: OCCUPATIONAL THERAPIST

## 2018-08-20 PROCEDURE — G8988 SELF CARE GOAL STATUS: HCPCS | Performed by: OCCUPATIONAL THERAPIST

## 2018-08-20 PROCEDURE — 96372 THER/PROPH/DIAG INJ SC/IM: CPT | Performed by: INTERNAL MEDICINE

## 2018-08-20 PROCEDURE — G8989 SELF CARE D/C STATUS: HCPCS | Performed by: OCCUPATIONAL THERAPIST

## 2018-08-20 NOTE — PROGRESS NOTES
Subjective:  I can do everything with my right hand. Objective:     Current level of performance:  ADL: Independent with all self care tasks needs. Work: Retired.   Leisure: Family    Measurements/Tests:  ROM:  Testing By: mariam  DIP Small Right: 0/45 d

## 2018-08-20 NOTE — PROGRESS NOTES
Order was verified along with pt's name and injection receiving. Pt was given the Prolia injection in the right arm posterior and pt tolerated injection well. Pt was given the return date for next injection (02/20/19) and understanding was voiced.

## 2018-09-07 ENCOUNTER — HOSPITAL ENCOUNTER (OUTPATIENT)
Dept: CT IMAGING | Facility: HOSPITAL | Age: 83
Discharge: HOME | End: 2018-09-07
Attending: INTERNAL MEDICINE
Payer: MEDICARE

## 2018-09-07 DIAGNOSIS — R91.1 PULMONARY NODULE: ICD-10-CM

## 2018-09-07 PROCEDURE — 71250 CT THORAX DX C-: CPT | Performed by: INTERNAL MEDICINE

## 2018-09-13 ENCOUNTER — TELEPHONE (OUTPATIENT)
Dept: PULMONOLOGY | Facility: CLINIC | Age: 83
End: 2018-09-13

## 2018-09-13 DIAGNOSIS — R91.1 PULMONARY NODULE: Primary | ICD-10-CM

## 2018-09-13 NOTE — TELEPHONE ENCOUNTER
----- Message from Enzo Johnson MD sent at 9/11/2018  9:31 PM CDT -----  RN, I spoke to the patient regarding her CT scan the chest which demonstrates an enlarging right upper lobe pulmonary nodule.   Please facilitate PET scan and let her know what she

## 2018-09-13 NOTE — TELEPHONE ENCOUNTER
Pt informed of below orders and given central scheduling phone # [phone] to call and schedule the PET scan. Pt verbalized understanding.

## 2018-09-18 ENCOUNTER — HOSPITAL ENCOUNTER (OUTPATIENT)
Dept: NUCLEAR MEDICINE | Facility: HOSPITAL | Age: 83
Discharge: HOME | End: 2018-09-18
Attending: INTERNAL MEDICINE
Payer: MEDICARE

## 2018-09-18 DIAGNOSIS — R91.1 PULMONARY NODULE: ICD-10-CM

## 2018-09-18 LAB — GLUCOSE BLDC GLUCOMTR-MCNC: 84 MG/DL (ref 70–99)

## 2018-09-18 PROCEDURE — 82962 GLUCOSE BLOOD TEST: CPT

## 2018-09-18 PROCEDURE — 78815 PET IMAGE W/CT SKULL-THIGH: CPT | Performed by: INTERNAL MEDICINE

## 2018-09-20 ENCOUNTER — TELEPHONE (OUTPATIENT)
Dept: PULMONOLOGY | Facility: CLINIC | Age: 83
End: 2018-09-20

## 2018-09-20 DIAGNOSIS — R91.1 PULMONARY NODULE: Primary | ICD-10-CM

## 2018-09-21 NOTE — TELEPHONE ENCOUNTER
LEFT DETAILED MESSAGE stting we received pt msg, PJC out of clinic today, returns Monday and to anticipate a cb with her results at that time.

## 2018-09-25 NOTE — TELEPHONE ENCOUNTER
RN, I spoke with the patient regarding the results of her PET scan. The pulmonary nodule did not light up.   Please add to the calendar a super dimension noncontrast CT scan of the chest at the 6-month interval.

## 2018-09-26 ENCOUNTER — OFFICE VISIT (OUTPATIENT)
Dept: INTERNAL MEDICINE CLINIC | Facility: CLINIC | Age: 83
End: 2018-09-26
Payer: MEDICARE

## 2018-09-26 VITALS
HEART RATE: 66 BPM | RESPIRATION RATE: 18 BRPM | WEIGHT: 119 LBS | DIASTOLIC BLOOD PRESSURE: 72 MMHG | SYSTOLIC BLOOD PRESSURE: 144 MMHG | BODY MASS INDEX: 21 KG/M2

## 2018-09-26 DIAGNOSIS — M81.0 AGE-RELATED OSTEOPOROSIS WITHOUT CURRENT PATHOLOGICAL FRACTURE: ICD-10-CM

## 2018-09-26 DIAGNOSIS — E55.9 VITAMIN D DEFICIENCY: ICD-10-CM

## 2018-09-26 DIAGNOSIS — K21.9 GASTRIC REFLUX SYNDROME: Primary | ICD-10-CM

## 2018-09-26 PROCEDURE — G0008 ADMIN INFLUENZA VIRUS VAC: HCPCS | Performed by: INTERNAL MEDICINE

## 2018-09-26 PROCEDURE — G0463 HOSPITAL OUTPT CLINIC VISIT: HCPCS | Performed by: INTERNAL MEDICINE

## 2018-09-26 PROCEDURE — 90653 IIV ADJUVANT VACCINE IM: CPT | Performed by: INTERNAL MEDICINE

## 2018-09-26 PROCEDURE — 99213 OFFICE O/P EST LOW 20 MIN: CPT | Performed by: INTERNAL MEDICINE

## 2018-09-26 NOTE — PROGRESS NOTES
HPI:    Patient ID: Don Warren is a 80year old female. Presents for follow-up on several medical conditions. HPI   Patient reports that she has been feeling well, she walks every day, keeps following healthy diet, reports good energy level.   Rece Ophthalmic Solution  Disp:  Rfl:    aspirin (ASPIR-81) 81 MG Oral Tab EC Take  by mouth. Aspir-81 81 mg tablet,delayed release Disp:  Rfl:    Calcium Carbonate-Vitamin D (CALCIUM 500/D) 500-200 MG-UNIT Oral Tab Take  by mouth.  Calcium 500 + D 500 mg (1,250 without current pathological fracture patient will continue with Prolia every 6 months, get labs 7-10 days prior to injections  Plan: CBC WITH DIFFERENTIAL WITH PLATELET, COMP         METABOLIC PANEL (14), VITAMIN D, 25-HYDROXY            (E55.9) Vitamin D

## 2018-11-12 ENCOUNTER — OFFICE VISIT (OUTPATIENT)
Dept: DERMATOLOGY CLINIC | Facility: CLINIC | Age: 83
End: 2018-11-12
Payer: MEDICARE

## 2018-11-12 DIAGNOSIS — L72.0 MILIA: ICD-10-CM

## 2018-11-12 DIAGNOSIS — D23.30 BENIGN NEOPLASM OF SKIN OF FACE: ICD-10-CM

## 2018-11-12 DIAGNOSIS — L82.1 SEBORRHEIC KERATOSES: ICD-10-CM

## 2018-11-12 DIAGNOSIS — D23.4 BENIGN NEOPLASM OF SCALP AND SKIN OF NECK: ICD-10-CM

## 2018-11-12 DIAGNOSIS — D23.5 BENIGN NEOPLASM OF SKIN OF TRUNK, EXCEPT SCROTUM: ICD-10-CM

## 2018-11-12 DIAGNOSIS — D23.60 BENIGN NEOPLASM OF SKIN OF UPPER LIMB, INCLUDING SHOULDER, UNSPECIFIED LATERALITY: ICD-10-CM

## 2018-11-12 DIAGNOSIS — Z86.006 HISTORY OF MELANOMA IN SITU: Primary | ICD-10-CM

## 2018-11-12 DIAGNOSIS — L81.4 SOLAR LENTIGO: ICD-10-CM

## 2018-11-12 DIAGNOSIS — D23.70 BENIGN NEOPLASM OF SKIN OF LOWER LIMB, INCLUDING HIP, UNSPECIFIED LATERALITY: ICD-10-CM

## 2018-11-12 DIAGNOSIS — Z87.2 HISTORY OF ACTINIC KERATOSES: ICD-10-CM

## 2018-11-12 PROCEDURE — 99214 OFFICE O/P EST MOD 30 MIN: CPT | Performed by: DERMATOLOGY

## 2018-11-12 PROCEDURE — G0463 HOSPITAL OUTPT CLINIC VISIT: HCPCS | Performed by: DERMATOLOGY

## 2018-11-12 NOTE — PROGRESS NOTES
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

## 2018-11-12 NOTE — PROGRESS NOTES
HPI:     Chief Complaint     Lesion        HPI     Lesion      Additional comments: LOV: 06/04/18. Pt presents with lesions of concern throughout body, pt requests a full skin exam. Pt has personal hx of Atypical Nevus and Melanoma IS.            Last edite eyes nightly.    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 ASSESSMENT/PLAN:   History of melanoma in situ  (primary encounter diagnosis)-no recurrence–patient will continue every 6 month exams. Self exams.   Under

## 2018-12-04 ENCOUNTER — OFFICE VISIT (OUTPATIENT)
Dept: INTERNAL MEDICINE CLINIC | Facility: CLINIC | Age: 83
End: 2018-12-04
Payer: MEDICARE

## 2018-12-04 DIAGNOSIS — M81.0 AGE-RELATED OSTEOPOROSIS WITHOUT CURRENT PATHOLOGICAL FRACTURE: ICD-10-CM

## 2018-12-04 DIAGNOSIS — Z00.00 PHYSICAL EXAM, ANNUAL: Primary | ICD-10-CM

## 2018-12-04 DIAGNOSIS — R91.1 PULMONARY NODULE: ICD-10-CM

## 2018-12-04 DIAGNOSIS — Z86.006 HISTORY OF MELANOMA IN SITU: ICD-10-CM

## 2018-12-04 DIAGNOSIS — J30.9 ALLERGIC SINUSITIS: ICD-10-CM

## 2018-12-04 PROCEDURE — G0439 PPPS, SUBSEQ VISIT: HCPCS | Performed by: INTERNAL MEDICINE

## 2018-12-04 NOTE — PROGRESS NOTES
HPI:   Parviz Espinal is a 80year old female who presents for a Medicare Subsequent Annual Wellness visit (Pt already had Initial Annual Wellness). Patient reports that overall she has been feeling well, she keeps active walks every day to 3 miles. tobacco.    CAGE Alcohol screening   Camelia Matamoros was screened for Alcohol abuse and had a score of 0 so is at low risk.     Patient Care Team: Patient Care Team:  Cristofer Small MD as PCP - General (Internal Medicine)    Patient Active Problem List: Cap Take  by mouth. FISH OIL (unknown strength)   latanoprost (XALATAN) 0.005 % Ophthalmic Solution Place 1 drop into both eyes nightly.         MEDICAL INFORMATION:   She  has a past medical history of Atypical nevus (2012), [facility] ([facility] Melanoma in situ 21.01 kg/m² as calculated from the following:    Height as of 5/19/18: 5' 3\" (1.6 m). Weight as of this encounter: 118 lb 9.6 oz (53.8 kg).     Medicare Hearing Assessment  (Required for AWV/SWV)    Questionnaire       Visual Acuity PLAN SUMMARY:   Diagnoses and all orders for this visit:    Physical exam, annual patient will continue healthy diet, will continue be physically active as tolerated,    Age-related osteoporosis without current pathological fracture continue calcium an are no preventive care reminders to display for this patient. Update Health Maintenance if applicable    Flex Sigmoidoscopy Screen every 10 years No results found for this or any previous visit. No flowsheet data found.      Fecal Occult Blood Annually No r Medicare does not cover unless Medically needed    Zoster  Not covered by Medicare Part B No vaccine history found This may be covered with your pharmacy  prescription benefits                    Template: [address] [[id number]]

## 2018-12-07 VITALS
HEIGHT: 62 IN | WEIGHT: 118.63 LBS | BODY MASS INDEX: 21.83 KG/M2 | SYSTOLIC BLOOD PRESSURE: 155 MMHG | HEART RATE: 66 BPM | DIASTOLIC BLOOD PRESSURE: 78 MMHG | RESPIRATION RATE: 18 BRPM

## 2018-12-11 RX ORDER — OMEPRAZOLE 20 MG/1
CAPSULE, DELAYED RELEASE ORAL
Qty: 90 CAPSULE | Refills: 0 | Status: SHIPPED | OUTPATIENT
Start: 2018-12-11 | End: 2022-01-24 | Stop reason: ALTCHOICE

## 2018-12-12 NOTE — TELEPHONE ENCOUNTER
Refill passed per [facility] protocol.   Refill Protocol Appointment Criteria  · Appointment scheduled in the past 12 months or in the next 3 months  Recent Outpatient Visits            1 week ago Physical exam, annual    [facility], [address] [place] [address]

## 2019-01-21 ENCOUNTER — TELEPHONE (OUTPATIENT)
Dept: OTHER | Age: 84
End: 2019-01-21

## 2019-01-21 NOTE — TELEPHONE ENCOUNTER
Pt states she is due for her Prolia injection on 2/20/19 and is asking if this has been approved yet. Pt states doctor instructed her to have her lab work completed one week prior to receiving the Prolia injection.  Informed pt lab orders have been placed,

## 2019-02-04 NOTE — TELEPHONE ENCOUNTER
Summary of benefits received; Prolia is available. Benefits subject to a $185 deductible ($0 met) and a 20% co-insurance for the administration and cost of Prolia.  Medicare supplement plan F covers the Medicare part B deductible, co-insurance and 100% of t

## 2019-02-13 ENCOUNTER — TELEPHONE (OUTPATIENT)
Dept: INTERNAL MEDICINE CLINIC | Facility: CLINIC | Age: 84
End: 2019-02-13

## 2019-02-13 ENCOUNTER — LAB ENCOUNTER (OUTPATIENT)
Dept: LAB | Facility: HOSPITAL | Age: 84
End: 2019-02-13
Attending: OPTOMETRIST
Payer: MEDICARE

## 2019-02-13 DIAGNOSIS — M81.0 AGE-RELATED OSTEOPOROSIS WITHOUT CURRENT PATHOLOGICAL FRACTURE: Primary | ICD-10-CM

## 2019-02-13 DIAGNOSIS — M81.0 AGE-RELATED OSTEOPOROSIS WITHOUT CURRENT PATHOLOGICAL FRACTURE: ICD-10-CM

## 2019-02-13 DIAGNOSIS — H35.61 RETINAL HEMORRHAGE OF RIGHT EYE: ICD-10-CM

## 2019-02-13 DIAGNOSIS — E55.9 VITAMIN D DEFICIENCY: ICD-10-CM

## 2019-02-13 DIAGNOSIS — R73.03 BORDERLINE DIABETES: Primary | ICD-10-CM

## 2019-02-13 LAB
25(OH)D3 SERPL-MCNC: 51 NG/ML (ref 30–100)
ALBUMIN SERPL-MCNC: 3.8 G/DL (ref 3.4–5)
ALBUMIN/GLOB SERPL: 1.2 {RATIO} (ref 1–2)
ALP LIVER SERPL-CCNC: 65 U/L (ref 55–142)
ALT SERPL-CCNC: 28 U/L (ref 13–56)
ANION GAP SERPL CALC-SCNC: 11 MMOL/L (ref 0–18)
AST SERPL-CCNC: 21 U/L (ref 15–37)
BASOPHILS # BLD AUTO: 0.02 X10(3) UL (ref 0–0.2)
BASOPHILS NFR BLD AUTO: 0.5 %
BILIRUB SERPL-MCNC: 0.8 MG/DL (ref 0.1–2)
BUN BLD-MCNC: 14 MG/DL (ref 7–18)
BUN/CREAT SERPL: 23.3 (ref 10–20)
CALCIUM BLD-MCNC: 9.1 MG/DL (ref 8.5–10.1)
CHLORIDE SERPL-SCNC: 98 MMOL/L (ref 98–107)
CO2 SERPL-SCNC: 31 MMOL/L (ref 21–32)
CREAT BLD-MCNC: 0.6 MG/DL (ref 0.55–1.02)
DEPRECATED RDW RBC AUTO: 45.6 FL (ref 35.1–46.3)
EOSINOPHIL # BLD AUTO: 0.1 X10(3) UL (ref 0–0.7)
EOSINOPHIL NFR BLD AUTO: 2.7 %
ERYTHROCYTE [DISTWIDTH] IN BLOOD BY AUTOMATED COUNT: 12.7 % (ref 11–15)
EST. AVERAGE GLUCOSE BLD GHB EST-MCNC: 117 MG/DL (ref 68–126)
GLOBULIN PLAS-MCNC: 3.2 G/DL (ref 2.8–4.4)
GLUCOSE BLD-MCNC: 89 MG/DL (ref 70–99)
HBA1C MFR BLD HPLC: 5.7 % (ref ?–5.7)
HCT VFR BLD AUTO: 42.2 % (ref 35–48)
HGB BLD-MCNC: 13.8 G/DL (ref 12–16)
IMM GRANULOCYTES # BLD AUTO: 0.01 X10(3) UL (ref 0–1)
IMM GRANULOCYTES NFR BLD: 0.3 %
LYMPHOCYTES # BLD AUTO: 1.3 X10(3) UL (ref 1–4)
LYMPHOCYTES NFR BLD AUTO: 34.5 %
M PROTEIN MFR SERPL ELPH: 7 G/DL (ref 6.4–8.2)
MCH RBC QN AUTO: 32.1 PG (ref 26–34)
MCHC RBC AUTO-ENTMCNC: 32.7 G/DL (ref 31–37)
MCV RBC AUTO: 98.1 FL (ref 80–100)
MONOCYTES # BLD AUTO: 0.4 X10(3) UL (ref 0.1–1)
MONOCYTES NFR BLD AUTO: 10.6 %
NEUTROPHILS # BLD AUTO: 1.94 X10 (3) UL (ref 1.5–7.7)
NEUTROPHILS # BLD AUTO: 1.94 X10(3) UL (ref 1.5–7.7)
NEUTROPHILS NFR BLD AUTO: 51.4 %
OSMOLALITY SERPL CALC.SUM OF ELEC: 290 MOSM/KG (ref 275–295)
PLATELET # BLD AUTO: 211 10(3)UL (ref 150–450)
POTASSIUM SERPL-SCNC: 4 MMOL/L (ref 3.5–5.1)
RBC # BLD AUTO: 4.3 X10(6)UL (ref 3.8–5.3)
SODIUM SERPL-SCNC: 140 MMOL/L (ref 136–145)
WBC # BLD AUTO: 3.8 X10(3) UL (ref 4–11)

## 2019-02-13 PROCEDURE — 85025 COMPLETE CBC W/AUTO DIFF WBC: CPT

## 2019-02-13 PROCEDURE — 80053 COMPREHEN METABOLIC PANEL: CPT

## 2019-02-13 PROCEDURE — 82306 VITAMIN D 25 HYDROXY: CPT

## 2019-02-13 PROCEDURE — 36415 COLL VENOUS BLD VENIPUNCTURE: CPT

## 2019-02-13 PROCEDURE — 83036 HEMOGLOBIN GLYCOSYLATED A1C: CPT

## 2019-02-13 NOTE — TELEPHONE ENCOUNTER
Pt orders was enter in the system incorrectly and need to be re-order so pt can labs done that  had order on 09/28/18       Please advise       Pt is at the lab at [facility] to take lab work

## 2019-02-20 ENCOUNTER — NURSE ONLY (OUTPATIENT)
Dept: INTERNAL MEDICINE CLINIC | Facility: CLINIC | Age: 84
End: 2019-02-20
Payer: MEDICARE

## 2019-02-20 DIAGNOSIS — M81.0 AGE-RELATED OSTEOPOROSIS WITHOUT CURRENT PATHOLOGICAL FRACTURE: Primary | ICD-10-CM

## 2019-02-20 PROCEDURE — 96372 THER/PROPH/DIAG INJ SC/IM: CPT | Performed by: INTERNAL MEDICINE

## 2019-02-20 NOTE — PROGRESS NOTES
Patient presents to office for prolia injection. Order and authorization verified on telephone encounter 1/21/19. Patient tolerated injection well no reactions.

## 2019-02-26 ENCOUNTER — NURSE TRIAGE (OUTPATIENT)
Dept: INTERNAL MEDICINE CLINIC | Facility: CLINIC | Age: 84
End: 2019-02-26

## 2019-02-26 NOTE — TELEPHONE ENCOUNTER
Action Requested: Summary for Provider     []  Critical Lab, Recommendations Needed  [x] Need Additional Advice  []   FYI    []   Need Orders  [] Need Medications Sent to Pharmacy  []  Other     SUMMARY: Dr Maira Yates, patient calls to report 4 day onset, Selene Sandhoff

## 2019-02-27 ENCOUNTER — OFFICE VISIT (OUTPATIENT)
Dept: INTERNAL MEDICINE CLINIC | Facility: CLINIC | Age: 84
End: 2019-02-27
Payer: MEDICARE

## 2019-02-27 ENCOUNTER — TELEPHONE (OUTPATIENT)
Dept: ENDOCRINOLOGY CLINIC | Facility: CLINIC | Age: 84
End: 2019-02-27

## 2019-02-27 VITALS
DIASTOLIC BLOOD PRESSURE: 85 MMHG | HEIGHT: 62 IN | SYSTOLIC BLOOD PRESSURE: 145 MMHG | HEART RATE: 89 BPM | RESPIRATION RATE: 16 BRPM | WEIGHT: 116 LBS | TEMPERATURE: 98 F | BODY MASS INDEX: 21.35 KG/M2

## 2019-02-27 DIAGNOSIS — J06.9 VIRAL UPPER RESPIRATORY TRACT INFECTION: Primary | ICD-10-CM

## 2019-02-27 PROBLEM — J30.9 ALLERGIC SINUSITIS: Status: RESOLVED | Noted: 2017-06-01 | Resolved: 2019-02-27

## 2019-02-27 PROCEDURE — G0463 HOSPITAL OUTPT CLINIC VISIT: HCPCS | Performed by: INTERNAL MEDICINE

## 2019-02-27 PROCEDURE — 99213 OFFICE O/P EST LOW 20 MIN: CPT | Performed by: INTERNAL MEDICINE

## 2019-02-27 NOTE — TELEPHONE ENCOUNTER
Advised patient of Dr Rachel Evans  note. Patient verbalized understanding and had no further questions.  Appointment made for 2/27/19 at 3pm with Dr. Josemanuel Gore

## 2019-02-27 NOTE — PROGRESS NOTES
HPI:    Patient ID: Ashtyn Roche is a 80year old female. Presents for evaluation of the cold symptoms    HPI  5 days ago developed fever chills, felt fatigued, had a lot of nasal congestion. On couple occasions had night sweats.   Last time she felt t MG Oral Cap Take  by mouth. FISH OIL (unknown strength) Disp:  Rfl:    latanoprost (XALATAN) 0.005 % Ophthalmic Solution Place 1 drop into both eyes nightly.    Disp:  Rfl:      Allergies:No Known Allergies   /85   Pulse 89   Temp 98.1 °F (36.7 °C) (O

## 2019-03-13 ENCOUNTER — HOSPITAL ENCOUNTER (EMERGENCY)
Facility: HOSPITAL | Age: 84
Discharge: HOME | End: 2019-03-13
Payer: MEDICARE

## 2019-03-13 ENCOUNTER — APPOINTMENT (OUTPATIENT)
Dept: GENERAL RADIOLOGY | Facility: HOSPITAL | Age: 84
End: 2019-03-13
Payer: MEDICARE

## 2019-03-13 VITALS
DIASTOLIC BLOOD PRESSURE: 67 MMHG | HEIGHT: 63 IN | SYSTOLIC BLOOD PRESSURE: 169 MMHG | WEIGHT: 116 LBS | RESPIRATION RATE: 16 BRPM | HEART RATE: 65 BPM | TEMPERATURE: 99 F | OXYGEN SATURATION: 97 % | BODY MASS INDEX: 20.55 KG/M2
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
## 2019-03-13 DIAGNOSIS — S60.221A CONTUSION OF RIGHT HAND, INITIAL ENCOUNTER: ICD-10-CM

## 2019-03-13 DIAGNOSIS — S60.222A CONTUSION OF LEFT HAND, INITIAL ENCOUNTER: ICD-10-CM

## 2019-03-13 DIAGNOSIS — S00.83XA CHIN CONTUSION, INITIAL ENCOUNTER: ICD-10-CM

## 2019-03-13 PROCEDURE — 73130 X-RAY EXAM OF HAND: CPT

## 2019-03-13 PROCEDURE — 99283 EMERGENCY DEPT VISIT LOW MDM: CPT

## 2019-03-13 NOTE — ED INITIAL ASSESSMENT (HPI)
Pt states that she had a mechanical fall last night. She has some bruising to right lower chin, a small laceration on underside of chin, some swelling and bruising to left wrist and 5th finger, and an abrasion to right palm. Denies any LOC.

## 2019-03-14 NOTE — ED PROVIDER NOTES
Patient Seen in: [facility] AND [facility] Emergency Department    History   Patient presents with:  Fall (musculoskeletal, neurologic)      HPI    Patient presents to the ED after tripping while walking yesterday and falling out of the sidewalk.   She states ariane History of tanning: No        Pt has a pacemaker: No        Pt has a defibrillator: No        Reaction to local anesthetic: No          \"Puts me out\"        Caffeine Concern: Yes          Coffee, 5 cups daily;          Left Handed: No        Right Hand Psychiatric: She has a normal mood and affect. Her behavior is normal.   Nursing note and vitals reviewed.       ED Course      Labs Reviewed - No data to display      Imaging Results Available and Reviewed while in ED: Xr Hand (min 3 Views), Left (cpt=73 following a fall yesterday. Bilateral hand contusions without evidence for fracture on x-rays. Shin contusion with out clinical concern for mandible fracture. Patient reassured, stable for discharge home with continued supportive care and PMD follow-up.

## 2019-03-14 NOTE — ED NOTES
Reviewed discharge information with patient. Patient verbalized understanding, no further questions or complaints at this time. Patient is alert and orientated x4, in no apparent distress, ambulating with steady gait. No IV in.  Instructed patient to return

## 2019-03-14 NOTE — ED NOTES
Rosa Machelle is here for eval of trip/fall yesterday. States she may have slipped on ice, which caused her to fall forward. She tried to break her fall with her hands but hit the right side of her chin. There is some bruising to the right side of her chin.   Belle Dylan

## 2019-03-17 ENCOUNTER — HOSPITAL ENCOUNTER (OUTPATIENT)
Dept: CT IMAGING | Facility: HOSPITAL | Age: 84
Discharge: HOME | End: 2019-03-17
Attending: INTERNAL MEDICINE
Payer: MEDICARE

## 2019-03-17 DIAGNOSIS — R91.1 PULMONARY NODULE: ICD-10-CM

## 2019-03-17 PROCEDURE — 71250 CT THORAX DX C-: CPT | Performed by: INTERNAL MEDICINE

## 2019-03-26 DIAGNOSIS — R91.1 PULMONARY NODULE: Primary | ICD-10-CM

## 2019-05-13 ENCOUNTER — OFFICE VISIT (OUTPATIENT)
Dept: DERMATOLOGY CLINIC | Facility: CLINIC | Age: 84
End: 2019-05-13
Payer: MEDICARE

## 2019-05-13 DIAGNOSIS — L72.0 MILIA: ICD-10-CM

## 2019-05-13 DIAGNOSIS — D23.70 BENIGN NEOPLASM OF SKIN OF LOWER LIMB, INCLUDING HIP, UNSPECIFIED LATERALITY: ICD-10-CM

## 2019-05-13 DIAGNOSIS — D23.30 BENIGN NEOPLASM OF SKIN OF FACE: ICD-10-CM

## 2019-05-13 DIAGNOSIS — L81.4 SOLAR LENTIGO: ICD-10-CM

## 2019-05-13 DIAGNOSIS — D23.60 BENIGN NEOPLASM OF SKIN OF UPPER LIMB, INCLUDING SHOULDER, UNSPECIFIED LATERALITY: ICD-10-CM

## 2019-05-13 DIAGNOSIS — D23.4 BENIGN NEOPLASM OF SCALP AND SKIN OF NECK: ICD-10-CM

## 2019-05-13 DIAGNOSIS — Z86.006 HISTORY OF MELANOMA IN SITU: Primary | ICD-10-CM

## 2019-05-13 DIAGNOSIS — L82.1 SEBORRHEIC KERATOSES: ICD-10-CM

## 2019-05-13 DIAGNOSIS — D23.5 BENIGN NEOPLASM OF SKIN OF TRUNK, EXCEPT SCROTUM: ICD-10-CM

## 2019-05-13 DIAGNOSIS — Z87.2 HISTORY OF ACTINIC KERATOSES: ICD-10-CM

## 2019-05-13 PROCEDURE — G0463 HOSPITAL OUTPT CLINIC VISIT: HCPCS | Performed by: DERMATOLOGY

## 2019-05-13 PROCEDURE — 99214 OFFICE O/P EST MOD 30 MIN: CPT | Performed by: DERMATOLOGY

## 2019-05-13 NOTE — PROGRESS NOTES
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

## 2019-05-13 NOTE — PROGRESS NOTES
HPI:     Chief Complaint     Full Skin Exam        HPI     Full Skin Exam      Additional comments: LOV 11/12/2018 Patient present for 6 month full body skin exam .Patient has hx of melanoma ,Atypical nevus           Last edited by Annetta Salazar on Rfl:    Multiple Vitamins-Minerals (CENTRUM SILVER) Oral Tab Take  by mouth. take 1 tablet by oral route  every day Disp:  Rfl:    Omega-3 Fatty Acids (RA FISH OIL) 1000 MG Oral Cap Take  by mouth.  FISH OIL (unknown strength) Disp:  Rfl:    latanoprost (XA organizations: Not on file        Relationship status: Not on file      Intimate partner violence:        Fear of current or ex partner: Not on file        Emotionally abused: Not on file        Physically abused: Not on file        Forced sexual activity: buttocks, genital area, l lower extremity and right lower extremity. Also exam of scalp, as well as cervical, axillary, inguinal, and supraclavicular lymph nodes. The patient is alert, oriented, and appears their stated age.   Patient is well nourished including shoulder, unspecified laterality  Benign neoplasm of skin of lower limb, including hip, unspecified laterality  Benign neoplasm of scalp and skin of neck  History of actinic keratoses    No orders of the defined types were placed in this encounte

## 2019-09-04 ENCOUNTER — TELEPHONE (OUTPATIENT)
Dept: PULMONOLOGY | Facility: CLINIC | Age: 84
End: 2019-09-04

## 2019-09-16 ENCOUNTER — HOSPITAL ENCOUNTER (OUTPATIENT)
Dept: CT IMAGING | Facility: HOSPITAL | Age: 84
Discharge: HOME | End: 2019-09-16
Attending: INTERNAL MEDICINE
Payer: MEDICARE

## 2019-09-16 DIAGNOSIS — R91.1 PULMONARY NODULE: ICD-10-CM

## 2019-09-16 PROCEDURE — 71250 CT THORAX DX C-: CPT | Performed by: INTERNAL MEDICINE

## 2019-10-09 DIAGNOSIS — R91.8 GROUND GLASS OPACITY PRESENT ON IMAGING OF LUNG: Primary | ICD-10-CM

## 2019-10-09 DIAGNOSIS — R91.1 LUNG NODULE: ICD-10-CM

## 2019-10-11 ENCOUNTER — TELEPHONE (OUTPATIENT)
Dept: INTERNAL MEDICINE CLINIC | Facility: CLINIC | Age: 84
End: 2019-10-11

## 2019-10-11 DIAGNOSIS — M81.0 AGE-RELATED OSTEOPOROSIS WITHOUT CURRENT PATHOLOGICAL FRACTURE: Primary | ICD-10-CM

## 2019-10-11 NOTE — TELEPHONE ENCOUNTER
DEXA scan ordered per verbal order of Dr. Negin Mark. Dx: Osteoporosis    Pt states would like to get Prolia injection. Was due in August.  States might need authorization from Medicare?

## 2019-10-31 ENCOUNTER — HOSPITAL ENCOUNTER (OUTPATIENT)
Dept: BONE DENSITY | Age: 84
Discharge: HOME | End: 2019-10-31
Attending: INTERNAL MEDICINE
Payer: MEDICARE

## 2019-10-31 DIAGNOSIS — M81.0 AGE-RELATED OSTEOPOROSIS WITHOUT CURRENT PATHOLOGICAL FRACTURE: ICD-10-CM

## 2019-10-31 PROCEDURE — 77080 DXA BONE DENSITY AXIAL: CPT | Performed by: INTERNAL MEDICINE

## 2019-11-05 ENCOUNTER — TELEPHONE (OUTPATIENT)
Dept: INTERNAL MEDICINE CLINIC | Facility: CLINIC | Age: 84
End: 2019-11-05

## 2019-11-05 NOTE — TELEPHONE ENCOUNTER
Pt was here today to receive a Prolia Injection. Pt was suppose to receive injection back in August, telephone communication was closed.     Please help with prior authorization for pt as she was upset that this has not been completed and had waited a mon

## 2019-11-06 ENCOUNTER — TELEPHONE (OUTPATIENT)
Dept: INTERNAL MEDICINE CLINIC | Facility: CLINIC | Age: 84
End: 2019-11-06

## 2019-11-06 NOTE — TELEPHONE ENCOUNTER
Prolia Verification request form was completed and faxed to Doug Gray 50 [phone], response time is 7-10 business days

## 2019-11-07 NOTE — TELEPHONE ENCOUNTER
[employer] Summary of benefits received for Prolia. No prior authorization is needed. Primary benefits: Subject to a $185 deductible ($185 has been met) and a 20% co-insurance for the administration and cost of Prolia. Secondary benefits:  This is a medi

## 2019-11-08 ENCOUNTER — NURSE ONLY (OUTPATIENT)
Dept: INTERNAL MEDICINE CLINIC | Facility: CLINIC | Age: 84
End: 2019-11-08
Payer: MEDICARE

## 2019-11-08 DIAGNOSIS — M81.0 AGE-RELATED OSTEOPOROSIS WITHOUT CURRENT PATHOLOGICAL FRACTURE: Primary | ICD-10-CM

## 2019-11-08 PROCEDURE — 96372 THER/PROPH/DIAG INJ SC/IM: CPT | Performed by: INTERNAL MEDICINE

## 2019-11-08 NOTE — PROGRESS NOTES
Patient patient came in for a prolia injection. Ordered by Armani Snow on 19. Patient verified name and . Patient responded well to injection, no reaction noted.

## 2019-12-10 ENCOUNTER — OFFICE VISIT (OUTPATIENT)
Dept: DERMATOLOGY CLINIC | Facility: CLINIC | Age: 84
End: 2019-12-10
Payer: MEDICARE

## 2019-12-10 DIAGNOSIS — D23.60 BENIGN NEOPLASM OF SKIN OF UPPER LIMB, INCLUDING SHOULDER, UNSPECIFIED LATERALITY: ICD-10-CM

## 2019-12-10 DIAGNOSIS — D23.4 BENIGN NEOPLASM OF SCALP AND SKIN OF NECK: ICD-10-CM

## 2019-12-10 DIAGNOSIS — D23.30 BENIGN NEOPLASM OF SKIN OF FACE: ICD-10-CM

## 2019-12-10 DIAGNOSIS — D48.5 NEOPLASM OF UNCERTAIN BEHAVIOR OF SKIN: Primary | ICD-10-CM

## 2019-12-10 DIAGNOSIS — Z87.2 HISTORY OF ACTINIC KERATOSES: ICD-10-CM

## 2019-12-10 DIAGNOSIS — Z86.006 HISTORY OF MELANOMA IN SITU: ICD-10-CM

## 2019-12-10 DIAGNOSIS — L81.4 SOLAR LENTIGO: ICD-10-CM

## 2019-12-10 DIAGNOSIS — D23.70 BENIGN NEOPLASM OF SKIN OF LOWER LIMB, INCLUDING HIP, UNSPECIFIED LATERALITY: ICD-10-CM

## 2019-12-10 DIAGNOSIS — L82.1 SEBORRHEIC KERATOSES: ICD-10-CM

## 2019-12-10 DIAGNOSIS — L72.0 MILIA: ICD-10-CM

## 2019-12-10 DIAGNOSIS — D23.5 BENIGN NEOPLASM OF SKIN OF TRUNK, EXCEPT SCROTUM: ICD-10-CM

## 2019-12-10 PROCEDURE — G0463 HOSPITAL OUTPT CLINIC VISIT: HCPCS | Performed by: DERMATOLOGY

## 2019-12-10 PROCEDURE — 11102 TANGNTL BX SKIN SINGLE LES: CPT | Performed by: DERMATOLOGY

## 2019-12-10 PROCEDURE — 88305 TISSUE EXAM BY PATHOLOGIST: CPT | Performed by: DERMATOLOGY

## 2019-12-10 PROCEDURE — 99213 OFFICE O/P EST LOW 20 MIN: CPT | Performed by: DERMATOLOGY

## 2019-12-10 NOTE — PROGRESS NOTES
HPI:     Chief Complaint     Full Skin Exam        HPI     Full Skin Exam      Additional comments: LOV 5/13/2019 Patient present for full body skin exam . Patient has hx of melanoma ,atypical nevus          Last edited by Annetta Neff on 12/10/201 Fatty Acids (RA FISH OIL) 1000 MG Oral Cap Take  by mouth. FISH OIL (unknown strength)     • latanoprost (XALATAN) 0.005 % Ophthalmic Solution Place 1 drop into both eyes nightly.          Allergies:   No Known Allergies    Past Medical History:   Diagnosis Emotionally abused: Not on file        Physically abused: Not on file        Forced sexual activity: Not on file    Other Topics      Concerns:        Grew up on a farm: Not Asked        History of tanning: No        Outdoor occupation: Not Asked patient is alert, oriented, and appears their stated age. Patient is well nourished and in no distress    The exam was remarkable for the following:  - there is no visible or palpable evidence of recurrent melanoma in situ.   Felicity Dandy is no appreciable adeno of skin of face  Benign neoplasm of skin of upper limb, including shoulder, unspecified laterality  Benign neoplasm of skin of lower limb, including hip, unspecified laterality  Benign neoplasm of scalp and skin of neck  History of actinic keratoses    Ord

## 2020-01-08 ENCOUNTER — OFFICE VISIT (OUTPATIENT)
Dept: INTERNAL MEDICINE CLINIC | Facility: CLINIC | Age: 85
End: 2020-01-08
Payer: MEDICARE

## 2020-01-08 VITALS
HEART RATE: 66 BPM | SYSTOLIC BLOOD PRESSURE: 156 MMHG | DIASTOLIC BLOOD PRESSURE: 74 MMHG | TEMPERATURE: 99 F | HEIGHT: 63 IN | BODY MASS INDEX: 20.66 KG/M2 | WEIGHT: 116.63 LBS

## 2020-01-08 DIAGNOSIS — J06.9 ACUTE URI: Primary | ICD-10-CM

## 2020-01-08 PROCEDURE — G0463 HOSPITAL OUTPT CLINIC VISIT: HCPCS | Performed by: INTERNAL MEDICINE

## 2020-01-08 PROCEDURE — 99213 OFFICE O/P EST LOW 20 MIN: CPT | Performed by: INTERNAL MEDICINE

## 2020-01-08 NOTE — PATIENT INSTRUCTIONS
Please call if your cold symptoms do not continue to resolve. Schedule an appointment with Dr. Melquiades Prasad soon because your blood pressure was high at today's visit.

## 2020-01-08 NOTE — PROGRESS NOTES
Estelle Young is a 80year old female. Patient presents with:  Cough    HPI:   For the past 4-5 days, she has had symptoms of nasal congestion, hoarseness and cough occasionally productive of yellow sputum. No associated fever.   No purulent nasal draina Used    Alcohol use:  Yes      Alcohol/week: 0.8 standard drinks      Types: 1 Glasses of wine per week      Comment: 1 drink weekly    Drug use: No       EXAM:   GENERAL: Pleasant female appearing well and breathing comfortably in no distress  /74

## 2020-02-07 ENCOUNTER — OFFICE VISIT (OUTPATIENT)
Dept: INTERNAL MEDICINE CLINIC | Facility: CLINIC | Age: 85
End: 2020-02-07
Payer: MEDICARE

## 2020-02-07 VITALS
SYSTOLIC BLOOD PRESSURE: 150 MMHG | WEIGHT: 118 LBS | BODY MASS INDEX: 21 KG/M2 | DIASTOLIC BLOOD PRESSURE: 71 MMHG | HEART RATE: 70 BPM | RESPIRATION RATE: 18 BRPM

## 2020-02-07 DIAGNOSIS — R03.0 ELEVATED BLOOD PRESSURE READING: ICD-10-CM

## 2020-02-07 DIAGNOSIS — R91.1 PULMONARY NODULE: ICD-10-CM

## 2020-02-07 DIAGNOSIS — J37.0 CHRONIC LARYNGITIS: ICD-10-CM

## 2020-02-07 DIAGNOSIS — M81.0 AGE-RELATED OSTEOPOROSIS WITHOUT CURRENT PATHOLOGICAL FRACTURE: ICD-10-CM

## 2020-02-07 DIAGNOSIS — Z00.00 PHYSICAL EXAM, ANNUAL: Primary | ICD-10-CM

## 2020-02-07 PROCEDURE — G0439 PPPS, SUBSEQ VISIT: HCPCS | Performed by: INTERNAL MEDICINE

## 2020-02-07 PROCEDURE — 90662 IIV NO PRSV INCREASED AG IM: CPT | Performed by: INTERNAL MEDICINE

## 2020-02-07 PROCEDURE — G0008 ADMIN INFLUENZA VIRUS VAC: HCPCS | Performed by: INTERNAL MEDICINE

## 2020-02-08 NOTE — PROGRESS NOTES
HPI:   Katina Das is a 80year old female who presents for a Medicare Initial Annual Wellness visit (Once after 12 month Medicare anniversary) .     Patient reports that she has been doing well, she has chronic postnasal drainage, tried multiple medica Actinic keratosis     Inflamed seborrheic keratosis     Pulmonary nodule     Herpes zoster    Wt Readings from Last 3 Encounters:  02/07/20 : 118 lb (53.5 kg)  01/08/20 : 116 lb 9.6 oz (52.9 kg)  03/13/19 : 116 lb (52.6 kg)     Last Cholesterol Labs:   Lab history that includes colonoscopy; d & c; tonsillectomy; adenoidectomy; and cataract (Bilateral).     Her family history includes Breast Cancer in her sister; Colon Cancer in her sister, sister and another family member; Diabetes in her mother; Heart Disord have a problem hearing over the telephone:  No I have trouble following the conversations when two or more people are talking at the same time:  No   I have trouble understanding things on the TV:  No I have to strain to understand conversations:  No   I h Effort normal and breath sounds normal. She has no wheezes. She has no rales. Abdominal: Soft. There is no hepatosplenomegaly. There is no rebound and no guarding. Lymphadenopathy:     She has no cervical adenopathy.    Neurological: She is alert and or have you lost more than 10 pounds without trying?: 2 - No  Has your appetite been poor?: No  How does the patient maintain a good energy level?: Daily Walks  How would you describe your daily physical activity?: Light  How would you describe your current h found.    Screening Mammogram      Mammogram  Annually to 76, then as discussed There are no preventive care reminders to display for this patient.  Update Health Maintenance if applicable     Immunizations (Update Immunization Activity if applicable)     I

## 2020-04-23 ENCOUNTER — TELEPHONE (OUTPATIENT)
Dept: INTERNAL MEDICINE CLINIC | Facility: CLINIC | Age: 85
End: 2020-04-23

## 2020-04-23 DIAGNOSIS — R09.89 CAROTID BRUIT, UNSPECIFIED LATERALITY: ICD-10-CM

## 2020-04-23 DIAGNOSIS — H53.129 TRANSIENT VISUAL LOSS, UNSPECIFIED LATERALITY: Primary | ICD-10-CM

## 2020-04-23 DIAGNOSIS — G45.9 TIA (TRANSIENT ISCHEMIC ATTACK): ICD-10-CM

## 2020-04-23 NOTE — TELEPHONE ENCOUNTER
Spoke to [facility] specialist from [address] [facility] Dr. Cruz Spence, he requested that patient should have work-up for TIA, I contacted patient and ask you to set up video or telephone visit to discuss plan of care.

## 2020-04-24 ENCOUNTER — TELEMEDICINE (OUTPATIENT)
Dept: INTERNAL MEDICINE CLINIC | Facility: CLINIC | Age: 85
End: 2020-04-24

## 2020-04-24 DIAGNOSIS — R09.89 CAROTID BRUIT, UNSPECIFIED LATERALITY: ICD-10-CM

## 2020-04-24 DIAGNOSIS — G45.9 TIA (TRANSIENT ISCHEMIC ATTACK): Primary | ICD-10-CM

## 2020-04-24 DIAGNOSIS — H53.129 TRANSIENT VISUAL LOSS, UNSPECIFIED LATERALITY: ICD-10-CM

## 2020-04-24 PROCEDURE — 99214 OFFICE O/P EST MOD 30 MIN: CPT | Performed by: INTERNAL MEDICINE

## 2020-04-24 NOTE — TELEPHONE ENCOUNTER
Patient called to schedule appt for today, video visit scheduled for 1:40. Patient completed E check in and was given instructions on how to start visit. Patient will call office if she has trouble starting video.

## 2020-04-25 ENCOUNTER — APPOINTMENT (OUTPATIENT)
Dept: LAB | Facility: HOSPITAL | Age: 85
End: 2020-04-25
Attending: INTERNAL MEDICINE
Payer: MEDICARE

## 2020-04-25 DIAGNOSIS — M81.0 AGE-RELATED OSTEOPOROSIS WITHOUT CURRENT PATHOLOGICAL FRACTURE: ICD-10-CM

## 2020-04-25 DIAGNOSIS — H53.129 TRANSIENT VISUAL LOSS, UNSPECIFIED LATERALITY: ICD-10-CM

## 2020-04-25 DIAGNOSIS — E55.9 VITAMIN D DEFICIENCY: ICD-10-CM

## 2020-04-25 DIAGNOSIS — D72.818 OTHER DECREASED WHITE BLOOD CELL (WBC) COUNT: ICD-10-CM

## 2020-04-25 PROCEDURE — 80053 COMPREHEN METABOLIC PANEL: CPT

## 2020-04-25 PROCEDURE — 36415 COLL VENOUS BLD VENIPUNCTURE: CPT

## 2020-04-25 PROCEDURE — 93005 ELECTROCARDIOGRAM TRACING: CPT

## 2020-04-25 PROCEDURE — 85652 RBC SED RATE AUTOMATED: CPT

## 2020-04-25 PROCEDURE — 85025 COMPLETE CBC W/AUTO DIFF WBC: CPT

## 2020-04-25 PROCEDURE — 82306 VITAMIN D 25 HYDROXY: CPT

## 2020-04-25 PROCEDURE — 86140 C-REACTIVE PROTEIN: CPT

## 2020-04-25 PROCEDURE — 93010 ELECTROCARDIOGRAM REPORT: CPT | Performed by: INTERNAL MEDICINE

## 2020-04-25 NOTE — PROGRESS NOTES
HPI:    Patient ID: Xiang Nelda is a 80year old female. Presents for follow-up after recent episodes of visual disturbance.   HPI  Patient reports that several days ago she developed sudden onset of left eye visual disturbance felt like a Magdalena wa (ASPIR-81) 81 MG Oral Tab EC Take  by mouth. Aspir-81 81 mg tablet,delayed release     • Calcium Carbonate-Vitamin D (CALCIUM 500/D) 500-200 MG-UNIT Oral Tab Take  by mouth.  Calcium 500 + D 500 mg (1,250 mg)-200 unit tablet     • Multiple Vitamins-Minerals Wicho Gilliam MD    #[id number]

## 2020-06-03 ENCOUNTER — HOSPITAL ENCOUNTER (OUTPATIENT)
Dept: CV DIAGNOSTICS | Facility: HOSPITAL | Age: 85
Discharge: HOME | End: 2020-06-03
Attending: INTERNAL MEDICINE
Payer: MEDICARE

## 2020-06-03 ENCOUNTER — LAB ENCOUNTER (OUTPATIENT)
Dept: LAB | Facility: HOSPITAL | Age: 85
End: 2020-06-03
Attending: INTERNAL MEDICINE
Payer: MEDICARE

## 2020-06-03 ENCOUNTER — HOSPITAL ENCOUNTER (OUTPATIENT)
Dept: ULTRASOUND IMAGING | Facility: HOSPITAL | Age: 85
Discharge: HOME | End: 2020-06-03
Attending: INTERNAL MEDICINE
Payer: MEDICARE

## 2020-06-03 DIAGNOSIS — R09.89 CAROTID BRUIT, UNSPECIFIED LATERALITY: ICD-10-CM

## 2020-06-03 DIAGNOSIS — H53.129 TRANSIENT VISUAL LOSS, UNSPECIFIED LATERALITY: ICD-10-CM

## 2020-06-03 DIAGNOSIS — G45.9 TIA (TRANSIENT ISCHEMIC ATTACK): ICD-10-CM

## 2020-06-03 DIAGNOSIS — H20.012 PRIMARY IRIDOCYCLITIS OF LEFT EYE: Primary | ICD-10-CM

## 2020-06-03 PROCEDURE — 85549 MURAMIDASE: CPT

## 2020-06-03 PROCEDURE — 86780 TREPONEMA PALLIDUM: CPT

## 2020-06-03 PROCEDURE — 82164 ANGIOTENSIN I ENZYME TEST: CPT

## 2020-06-03 PROCEDURE — 93306 TTE W/DOPPLER COMPLETE: CPT | Performed by: INTERNAL MEDICINE

## 2020-06-03 PROCEDURE — 85025 COMPLETE CBC W/AUTO DIFF WBC: CPT

## 2020-06-03 PROCEDURE — 86592 SYPHILIS TEST NON-TREP QUAL: CPT

## 2020-06-03 PROCEDURE — 86645 CMV ANTIBODY IGM: CPT

## 2020-06-03 PROCEDURE — 86812 HLA TYPING A B OR C: CPT

## 2020-06-03 PROCEDURE — 86695 HERPES SIMPLEX TYPE 1 TEST: CPT

## 2020-06-03 PROCEDURE — 86480 TB TEST CELL IMMUN MEASURE: CPT

## 2020-06-03 PROCEDURE — 93880 EXTRACRANIAL BILAT STUDY: CPT | Performed by: INTERNAL MEDICINE

## 2020-06-03 PROCEDURE — 36415 COLL VENOUS BLD VENIPUNCTURE: CPT

## 2020-06-03 PROCEDURE — 86644 CMV ANTIBODY: CPT

## 2020-06-03 PROCEDURE — 86696 HERPES SIMPLEX TYPE 2 TEST: CPT

## 2020-06-11 ENCOUNTER — OFFICE VISIT (OUTPATIENT)
Dept: DERMATOLOGY CLINIC | Facility: CLINIC | Age: 85
End: 2020-06-11
Payer: MEDICARE

## 2020-06-11 DIAGNOSIS — D23.4 BENIGN NEOPLASM OF SCALP AND SKIN OF NECK: ICD-10-CM

## 2020-06-11 DIAGNOSIS — D23.5 BENIGN NEOPLASM OF SKIN OF TRUNK, EXCEPT SCROTUM: ICD-10-CM

## 2020-06-11 DIAGNOSIS — L82.1 SEBORRHEIC KERATOSES: ICD-10-CM

## 2020-06-11 DIAGNOSIS — D23.70 BENIGN NEOPLASM OF SKIN OF LOWER LIMB, INCLUDING HIP, UNSPECIFIED LATERALITY: ICD-10-CM

## 2020-06-11 DIAGNOSIS — L81.4 SOLAR LENTIGO: ICD-10-CM

## 2020-06-11 DIAGNOSIS — D23.30 BENIGN NEOPLASM OF SKIN OF FACE: ICD-10-CM

## 2020-06-11 DIAGNOSIS — L72.0 MILIA: ICD-10-CM

## 2020-06-11 DIAGNOSIS — D23.60 BENIGN NEOPLASM OF SKIN OF UPPER LIMB, INCLUDING SHOULDER, UNSPECIFIED LATERALITY: ICD-10-CM

## 2020-06-11 DIAGNOSIS — Z86.006 HISTORY OF MELANOMA IN SITU: Primary | ICD-10-CM

## 2020-06-11 PROCEDURE — G0463 HOSPITAL OUTPT CLINIC VISIT: HCPCS | Performed by: DERMATOLOGY

## 2020-06-11 PROCEDURE — 99214 OFFICE O/P EST MOD 30 MIN: CPT | Performed by: DERMATOLOGY

## 2020-06-11 NOTE — PROGRESS NOTES
HPI:     Chief Complaint     Full Skin Exam        HPI     Full Skin Exam      Additional comments: LOV 12/10/2019 Patient present for full body skin exam .Patient has hx of Melanoma          Last edited by Annetta Osorio on 6/11/2020  1:37 PM. (Hist Cap Take  by mouth. FISH OIL (unknown strength)     • latanoprost (XALATAN) 0.005 % Ophthalmic Solution Place 1 drop into both eyes nightly.          Allergies:   No Known Allergies    Past Medical History:   Diagnosis Date   • Atypical nevus 2012    skin o Physically abused: Not on file        Forced sexual activity: Not on file    Other Topics      Concerns:        G[place] up [place] a farm: Not Asked        History of tanning: No        Outdoor occupation: Not Asked        Pt has a pacemaker: No        Pt has stated age. Patient is well nourished and in no distress    The exam was remarkable for the following:  - there is no visible or palpable evidence of recurrent melanoma in situ from the right cheek.   –There is no conjunctival pigmentation  –There is no ad (from the past 48 hour(s)). Meds This Visit:      Imaging Orders:  None     Referral Orders:  No orders of the defined types were placed in this encounter.         6/11/2020  Iris Houston

## 2020-07-10 ENCOUNTER — TELEPHONE (OUTPATIENT)
Dept: INTERNAL MEDICINE CLINIC | Facility: CLINIC | Age: 85
End: 2020-07-10

## 2020-07-10 NOTE — TELEPHONE ENCOUNTER
Spoke to patient, reminded her that I wanted her to see endocrinologist to be reevaluated for osteoporosis and future treatments. Provided her with the name and phone number.   Also patient was inquiring about pneumonia shot, she received first time in her

## 2020-07-10 NOTE — TELEPHONE ENCOUNTER
Dr. Marissa Bond, patient is due for her Prolia injection. [employer] has sent a reminder message. Please advise if ok to schedule a nurse visit. Last injection administered 11/08/2019, due 05/08/2020.     Please reply to [facility]: EM [facility]    Summary of benefit

## 2020-09-16 ENCOUNTER — TELEPHONE (OUTPATIENT)
Dept: PULMONOLOGY | Facility: CLINIC | Age: 85
End: 2020-09-16

## 2020-09-24 ENCOUNTER — OFFICE VISIT (OUTPATIENT)
Dept: OTOLARYNGOLOGY | Facility: CLINIC | Age: 85
End: 2020-09-24
Payer: MEDICARE

## 2020-09-24 VITALS
HEIGHT: 63 IN | WEIGHT: 116 LBS | SYSTOLIC BLOOD PRESSURE: 163 MMHG | DIASTOLIC BLOOD PRESSURE: 67 MMHG | BODY MASS INDEX: 20.55 KG/M2 | HEART RATE: 52 BPM

## 2020-09-24 DIAGNOSIS — J30.89 NON-SEASONAL ALLERGIC RHINITIS, UNSPECIFIED TRIGGER: Primary | ICD-10-CM

## 2020-09-24 PROCEDURE — G0463 HOSPITAL OUTPT CLINIC VISIT: HCPCS | Performed by: OTOLARYNGOLOGY

## 2020-09-24 PROCEDURE — 99203 OFFICE O/P NEW LOW 30 MIN: CPT | Performed by: OTOLARYNGOLOGY

## 2020-09-24 RX ORDER — BRIMONIDINE TARTRATE 2 MG/ML
SOLUTION/ DROPS OPHTHALMIC
COMMUNITY
Start: 2020-04-19

## 2020-09-24 RX ORDER — DORZOLAMIDE HYDROCHLORIDE AND TIMOLOL MALEATE 20; 5 MG/ML; MG/ML
SOLUTION/ DROPS OPHTHALMIC
COMMUNITY
Start: 2020-08-24

## 2020-09-24 RX ORDER — FLUTICASONE PROPIONATE 50 MCG
2 SPRAY, SUSPENSION (ML) NASAL DAILY
Qty: 1 BOTTLE | Refills: 3 | Status: SHIPPED | OUTPATIENT
Start: 2020-09-24 | End: 2021-06-15 | Stop reason: ALTCHOICE

## 2020-09-24 NOTE — PROGRESS NOTES
Angela Clark is a 80year old female.  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 Ariel Howell MD  9/24/2020  11:51 AM

## 2020-09-25 ENCOUNTER — TELEPHONE (OUTPATIENT)
Dept: INTERNAL MEDICINE CLINIC | Facility: CLINIC | Age: 85
End: 2020-09-25

## 2020-09-25 NOTE — TELEPHONE ENCOUNTER
Patient was on MyChart- she was scheduled for her flu and pneumonia vaccine - scheduled at [facility]. Today she called and spoke to Anabel Vazquez and was scheduled at 0830 and confirmed her appt at [facility].  She states on the first floor the  and the help desk

## 2020-09-28 ENCOUNTER — HOSPITAL ENCOUNTER (OUTPATIENT)
Dept: CT IMAGING | Facility: HOSPITAL | Age: 85
Discharge: HOME | End: 2020-09-28
Attending: INTERNAL MEDICINE
Payer: MEDICARE

## 2020-09-28 DIAGNOSIS — R91.1 LUNG NODULE: ICD-10-CM

## 2020-09-28 DIAGNOSIS — R91.8 GROUND GLASS OPACITY PRESENT ON IMAGING OF LUNG: ICD-10-CM

## 2020-09-28 PROCEDURE — 71250 CT THORAX DX C-: CPT | Performed by: INTERNAL MEDICINE

## 2020-09-29 ENCOUNTER — NURSE ONLY (OUTPATIENT)
Dept: INTERNAL MEDICINE CLINIC | Facility: CLINIC | Age: 85
End: 2020-09-29
Payer: MEDICARE

## 2020-09-29 ENCOUNTER — TELEPHONE (OUTPATIENT)
Dept: INTERNAL MEDICINE CLINIC | Facility: CLINIC | Age: 85
End: 2020-09-29

## 2020-09-29 DIAGNOSIS — Z23 IMMUNIZATION DUE: Primary | ICD-10-CM

## 2020-09-29 PROCEDURE — 90732 PPSV23 VACC 2 YRS+ SUBQ/IM: CPT | Performed by: INTERNAL MEDICINE

## 2020-09-29 PROCEDURE — G0009 ADMIN PNEUMOCOCCAL VACCINE: HCPCS | Performed by: INTERNAL MEDICINE

## 2020-09-29 PROCEDURE — 90662 IIV NO PRSV INCREASED AG IM: CPT | Performed by: INTERNAL MEDICINE

## 2020-09-29 PROCEDURE — G0008 ADMIN INFLUENZA VIRUS VAC: HCPCS | Performed by: INTERNAL MEDICINE

## 2020-10-06 DIAGNOSIS — R91.8 PULMONARY NODULES: Primary | ICD-10-CM

## 2020-10-08 ENCOUNTER — OFFICE VISIT (OUTPATIENT)
Dept: ENDOCRINOLOGY CLINIC | Facility: CLINIC | Age: 85
End: 2020-10-08
Payer: MEDICARE

## 2020-10-08 VITALS
WEIGHT: 118 LBS | BODY MASS INDEX: 22.28 KG/M2 | DIASTOLIC BLOOD PRESSURE: 75 MMHG | HEART RATE: 68 BPM | SYSTOLIC BLOOD PRESSURE: 167 MMHG | HEIGHT: 61 IN

## 2020-10-08 DIAGNOSIS — M81.0 AGE-RELATED OSTEOPOROSIS WITHOUT CURRENT PATHOLOGICAL FRACTURE: Primary | ICD-10-CM

## 2020-10-08 PROCEDURE — 99203 OFFICE O/P NEW LOW 30 MIN: CPT | Performed by: INTERNAL MEDICINE

## 2020-10-08 NOTE — PROGRESS NOTES
Name: Luis Roa  Date: 10/8/2020    Referring Physician: No ref. provider found    Patient presents with:  Consult: PCP advised pt to f/u with Endocrinologist due to Osteoporosis. Pt reports her last Prolia injection was 11/2019.       HISTORY OF PRES BEFORE BREAKFAST, Disp: 90 capsule, Rfl: 0  •  Vitamin K, Phytonadione, 100 MCG Oral Tab, Take by mouth., Disp: , Rfl:   •  denosumab (PROLIA) 60 MG/ML Subcutaneous Solution, Inject 60mg subcutaneously every 6 months for 2 years, Disp: 1 mL, Rfl: 0  •  ofl No        Hx of Radiation Treatments: No      Medical History:   Past Medical History:   Diagnosis Date   • Atypical nevus 2012    skin of right cheek   • [facility] ([facility]    • Melanoma in situ of cheek ([facility] 2012    \"Malignant melanoma in situ - Right cheek

## 2020-11-10 ENCOUNTER — TELEPHONE (OUTPATIENT)
Dept: ENDOCRINOLOGY CLINIC | Facility: CLINIC | Age: 85
End: 2020-11-10

## 2020-11-16 NOTE — TELEPHONE ENCOUNTER
Pt has Medicare Part B that covers 80% of prolia as long as it is Buy and Bill. Pt also has [address] Called directly to see if PA is needed. Was advised no PA is needed. Reference# [id number] . Called pt to schedule RN visit. Booked 11/20.

## 2020-11-20 ENCOUNTER — NURSE ONLY (OUTPATIENT)
Dept: ENDOCRINOLOGY CLINIC | Facility: CLINIC | Age: 85
End: 2020-11-20
Payer: MEDICARE

## 2020-11-20 DIAGNOSIS — M81.0 AGE-RELATED OSTEOPOROSIS WITHOUT CURRENT PATHOLOGICAL FRACTURE: Primary | ICD-10-CM

## 2020-11-20 PROCEDURE — 96372 THER/PROPH/DIAG INJ SC/IM: CPT | Performed by: INTERNAL MEDICINE

## 2020-11-20 NOTE — PROGRESS NOTES
Patient given and tolerated 60 mg Prolia injection to left upper arm. Patient advised to schedule next injection for 6 months + 1 day. RN advised patient labs may be needed and we will contact if necessary.

## 2021-01-08 ENCOUNTER — LAB ENCOUNTER (OUTPATIENT)
Dept: LAB | Facility: HOSPITAL | Age: 86
End: 2021-01-08
Attending: OPTOMETRIST
Payer: MEDICARE

## 2021-01-08 DIAGNOSIS — G45.3 AMAUROSIS FUGAX: Primary | ICD-10-CM

## 2021-01-08 DIAGNOSIS — H53.8 VISION BLURRED: ICD-10-CM

## 2021-01-08 DIAGNOSIS — H34.212 PARTIAL RETINAL ARTERY OCCLUSION OF LEFT EYE: ICD-10-CM

## 2021-01-08 LAB
CRP SERPL-MCNC: <0.29 MG/DL (ref ?–0.3)
ERYTHROCYTE [SEDIMENTATION RATE] IN BLOOD: 12 MM/HR

## 2021-01-08 PROCEDURE — 86140 C-REACTIVE PROTEIN: CPT

## 2021-01-08 PROCEDURE — 85652 RBC SED RATE AUTOMATED: CPT

## 2021-01-08 PROCEDURE — 36415 COLL VENOUS BLD VENIPUNCTURE: CPT

## 2021-01-12 ENCOUNTER — HOSPITAL ENCOUNTER (OUTPATIENT)
Dept: ULTRASOUND IMAGING | Facility: HOSPITAL | Age: 86
Discharge: HOME | End: 2021-01-12
Attending: OPTOMETRIST
Payer: MEDICARE

## 2021-01-12 DIAGNOSIS — G45.3 AMAUROSIS FUGAX: ICD-10-CM

## 2021-01-12 DIAGNOSIS — H53.8 OTHER VISUAL DISTURBANCES: ICD-10-CM

## 2021-01-12 DIAGNOSIS — H34.212: ICD-10-CM

## 2021-01-12 PROCEDURE — 93880 EXTRACRANIAL BILAT STUDY: CPT | Performed by: OPTOMETRIST

## 2021-02-09 ENCOUNTER — OFFICE VISIT (OUTPATIENT)
Dept: INTERNAL MEDICINE CLINIC | Facility: CLINIC | Age: 86
End: 2021-02-09
Payer: MEDICARE

## 2021-02-09 VITALS
HEART RATE: 64 BPM | SYSTOLIC BLOOD PRESSURE: 155 MMHG | BODY MASS INDEX: 23.16 KG/M2 | TEMPERATURE: 96 F | RESPIRATION RATE: 16 BRPM | WEIGHT: 118 LBS | HEIGHT: 60 IN | DIASTOLIC BLOOD PRESSURE: 76 MMHG

## 2021-02-09 DIAGNOSIS — J37.0 CHRONIC LARYNGITIS: ICD-10-CM

## 2021-02-09 DIAGNOSIS — M81.0 AGE-RELATED OSTEOPOROSIS WITHOUT CURRENT PATHOLOGICAL FRACTURE: ICD-10-CM

## 2021-02-09 DIAGNOSIS — R91.1 PULMONARY NODULE: ICD-10-CM

## 2021-02-09 DIAGNOSIS — J84.10 LUNG GRANULOMA (HCC): ICD-10-CM

## 2021-02-09 DIAGNOSIS — Z86.006 HISTORY OF MELANOMA IN SITU: ICD-10-CM

## 2021-02-09 DIAGNOSIS — Z00.00 PHYSICAL EXAM, ANNUAL: Primary | ICD-10-CM

## 2021-02-09 PROCEDURE — G0439 PPPS, SUBSEQ VISIT: HCPCS | Performed by: INTERNAL MEDICINE

## 2021-02-09 NOTE — PROGRESS NOTES
HPI:   Joshua Gamez is a 80year old female who presents for a Medicare Subsequent Annual Wellness visit (Pt already had Initial Annual Wellness).     Patient reports that overall she has been feeling well, bothered by chronic postnasal drainage, energy reflux syndrome     Osteoporosis     Chronic laryngitis     Vitamin D deficiency     Milia     Actinic keratosis     Inflamed seborrheic keratosis     Pulmonary nodule     Herpes zoster    Wt Readings from Last 3 Encounters:  02/09/21 : 118 lb (53.5 kg)  1 Osteoporosis, and Other and unspecified hyperlipidemia. She  has a past surgical history that includes colonoscopy; d & c; tonsillectomy; adenoidectomy; and cataract (Bilateral).     Her family history includes Breast Cancer in her sister; Colon Cancer i for AWV/SWV)    Hearing Screening    Screening Method: Questionnaire  I have a problem hearing over the telephone: No I have trouble following the conversations when two or more people are talking at the same time: No   I have trouble understanding things Abdominal: Soft. She exhibits no distension. There is no hepatosplenomegaly. There is no abdominal tenderness. There is no rebound and no guarding. Lymphadenopathy:     She has no cervical adenopathy.    Neurological: She is alert and oriented to person - No  Has your appetite been poor?: No  How does the patient maintain a good energy level?: Daily Walks  How would you describe your daily physical activity?: Moderate  How would you describe your current health state?: Good  How do you maintain positive m as discussed There are no preventive care reminders to display for this patient.  Update Health Maintenance if applicable     Immunizations (Update Immunization Activity if applicable)     Influenza  Covered Annually 09/29/2020 Please get every year    Pneu

## 2021-02-17 ENCOUNTER — TELEPHONE (OUTPATIENT)
Dept: PULMONOLOGY | Facility: CLINIC | Age: 86
End: 2021-02-17

## 2021-03-19 ENCOUNTER — HOSPITAL ENCOUNTER (OUTPATIENT)
Dept: CT IMAGING | Facility: HOSPITAL | Age: 86
Discharge: HOME | End: 2021-03-19
Attending: INTERNAL MEDICINE
Payer: MEDICARE

## 2021-03-19 DIAGNOSIS — R91.8 PULMONARY NODULES: ICD-10-CM

## 2021-03-19 PROCEDURE — 71250 CT THORAX DX C-: CPT | Performed by: INTERNAL MEDICINE

## 2021-04-28 ENCOUNTER — TELEPHONE (OUTPATIENT)
Dept: ENDOCRINOLOGY CLINIC | Facility: CLINIC | Age: 86
End: 2021-04-28

## 2021-04-28 DIAGNOSIS — M81.0 AGE-RELATED OSTEOPOROSIS WITHOUT CURRENT PATHOLOGICAL FRACTURE: Primary | ICD-10-CM

## 2021-04-28 NOTE — TELEPHONE ENCOUNTER
Pt had last prolia on 11/20/20  Will need labs before next prolia as last blood work for prolia was done 4/2020  Will order per protocol  rn called patient booked adam for 5/21/21  Labs ordered   Informed to do labs before adam

## 2021-04-29 NOTE — TELEPHONE ENCOUNTER
Patient has Medicare part B which covers 80% of cost if injection is administered in physicians office and if Prolia is buy and bill. Patient also has [facility] [employer] insurance to see if PA is required, none is required.      Conf #

## 2021-04-30 ENCOUNTER — OFFICE VISIT (OUTPATIENT)
Dept: OTOLARYNGOLOGY | Facility: CLINIC | Age: 86
End: 2021-04-30
Payer: MEDICARE

## 2021-04-30 VITALS
SYSTOLIC BLOOD PRESSURE: 94 MMHG | BODY MASS INDEX: 21.71 KG/M2 | DIASTOLIC BLOOD PRESSURE: 66 MMHG | TEMPERATURE: 97 F | HEIGHT: 62 IN | WEIGHT: 118 LBS

## 2021-04-30 DIAGNOSIS — J30.89 NON-SEASONAL ALLERGIC RHINITIS, UNSPECIFIED TRIGGER: Primary | ICD-10-CM

## 2021-04-30 PROCEDURE — 99213 OFFICE O/P EST LOW 20 MIN: CPT | Performed by: OTOLARYNGOLOGY

## 2021-04-30 RX ORDER — FLUTICASONE PROPIONATE 50 MCG
2 SPRAY, SUSPENSION (ML) NASAL DAILY
Qty: 1 BOTTLE | Refills: 5 | Status: SHIPPED | OUTPATIENT
Start: 2021-04-30 | End: 2022-01-24 | Stop reason: ALTCHOICE

## 2021-05-03 NOTE — PROGRESS NOTES
Kathie Camilo is a 80year old female. Patient presents with:  Sinus Problem: patient stated constanly having runny nose,no other sypmtoms    HPI:   She continues to experience clear nasal drainage. This is been a regular ongoing problem for her.   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 Flonase has helped her in the past.  I recommended that we continue it for now.   Return for any problems      The patient indicates understanding of these issues and agre

## 2021-05-12 ENCOUNTER — TELEPHONE (OUTPATIENT)
Dept: INTERNAL MEDICINE CLINIC | Facility: CLINIC | Age: 86
End: 2021-05-12

## 2021-05-13 NOTE — TELEPHONE ENCOUNTER
Only dates on file are 02/20/2019 and 11/20/2020. Both printed and mailed to patient.
Patient is requesting copy of list of prolia injection dates that she received from Dr. Ki Pyle office starting from date February of 2019. Patient is requesting that copy be mailed to her home address.
[address]

## 2021-05-17 ENCOUNTER — TELEPHONE (OUTPATIENT)
Dept: ENDOCRINOLOGY CLINIC | Facility: CLINIC | Age: 86
End: 2021-05-17

## 2021-05-17 ENCOUNTER — LAB ENCOUNTER (OUTPATIENT)
Dept: LAB | Facility: HOSPITAL | Age: 86
End: 2021-05-17
Attending: INTERNAL MEDICINE
Payer: MEDICARE

## 2021-05-17 DIAGNOSIS — M81.0 AGE-RELATED OSTEOPOROSIS WITHOUT CURRENT PATHOLOGICAL FRACTURE: ICD-10-CM

## 2021-05-17 PROCEDURE — 84080 ASSAY ALKALINE PHOSPHATASES: CPT

## 2021-05-17 PROCEDURE — 82306 VITAMIN D 25 HYDROXY: CPT

## 2021-05-17 PROCEDURE — 36415 COLL VENOUS BLD VENIPUNCTURE: CPT

## 2021-05-17 PROCEDURE — 80053 COMPREHEN METABOLIC PANEL: CPT

## 2021-05-17 PROCEDURE — 83970 ASSAY OF PARATHORMONE: CPT

## 2021-05-17 NOTE — TELEPHONE ENCOUNTER
Spoke to patient--confirmed that she does need labs done before her appt on 5/21. Labs are active in chart and pt is getting them done tomorrow.

## 2021-05-21 ENCOUNTER — NURSE ONLY (OUTPATIENT)
Dept: ENDOCRINOLOGY CLINIC | Facility: CLINIC | Age: 86
End: 2021-05-21
Payer: MEDICARE

## 2021-05-21 DIAGNOSIS — M81.0 AGE-RELATED OSTEOPOROSIS WITHOUT CURRENT PATHOLOGICAL FRACTURE: Primary | ICD-10-CM

## 2021-05-21 PROCEDURE — 96372 THER/PROPH/DIAG INJ SC/IM: CPT | Performed by: INTERNAL MEDICINE

## 2021-05-21 NOTE — PROGRESS NOTES
Patient given and tolerated 60 mg Prolia injection to right upper arm. Patient advised to schedule follow-up apt and next injection for 6 months + 1 day.

## 2021-06-15 ENCOUNTER — OFFICE VISIT (OUTPATIENT)
Dept: DERMATOLOGY CLINIC | Facility: CLINIC | Age: 86
End: 2021-06-15
Payer: MEDICARE

## 2021-06-15 VITALS — BODY MASS INDEX: 21.53 KG/M2 | WEIGHT: 117 LBS | HEIGHT: 62 IN

## 2021-06-15 DIAGNOSIS — L82.1 SEBORRHEIC KERATOSES: ICD-10-CM

## 2021-06-15 DIAGNOSIS — L81.4 SOLAR LENTIGO: ICD-10-CM

## 2021-06-15 DIAGNOSIS — Z86.006 HISTORY OF MELANOMA IN SITU: Primary | ICD-10-CM

## 2021-06-15 DIAGNOSIS — L82.0 INFLAMED SEBORRHEIC KERATOSIS: ICD-10-CM

## 2021-06-15 DIAGNOSIS — L72.0 MILIA: ICD-10-CM

## 2021-06-15 DIAGNOSIS — D22.9 MULTIPLE MELANOCYTIC NEVI: ICD-10-CM

## 2021-06-15 DIAGNOSIS — D18.01 HEMANGIOMA OF SKIN AND SUBCUTANEOUS TISSUE: ICD-10-CM

## 2021-06-15 DIAGNOSIS — L57.8 SUN-DAMAGED SKIN: ICD-10-CM

## 2021-06-15 PROCEDURE — 17110 DESTRUCTION B9 LES UP TO 14: CPT | Performed by: DERMATOLOGY

## 2021-06-15 PROCEDURE — 99213 OFFICE O/P EST LOW 20 MIN: CPT | Performed by: DERMATOLOGY

## 2021-06-15 NOTE — PROGRESS NOTES
HPI:     Chief Complaint     Full Skin Exam        HPI     Full Skin Exam      Additional comments: LOV 6/11/20 ,personal HX of Melanoma in situ          Last edited by Jose Pham, [address] on 6/15/2021 10:26 AM. (History)          Patient presents for luis Omega-3 Fatty Acids (RA FISH OIL) 1000 MG Oral Cap Take  by mouth.  FISH OIL (unknown strength)     • OMEPRAZOLE 20 MG Oral Capsule Delayed Release TAKE ONE CAPSULE BY MOUTH ONCE DAILY IN THE MORNING BEFORE BREAKFAST 90 capsule 0     Allergies:   No Known A Asked        Bike Helmet: Not Asked        Seat Belt: Not Asked        Self-Exams: Not Asked        Left Handed: No        Right Handed: Yes        Currently spends a great deal of time in the sun: No        Past Sunlamp Treatments for Acne: Not Asked extremity, and scalp    The patient is alert, oriented, and appears their stated age.   Patient is well nourished and in no distress    The exam was remarkable for the following:  - there is no evidence of recurrent melanoma in situ from right cheek  - christa changing.   Proper sunblock use  of SPF 30 or higher, hats, discussed    Orders Placed This Encounter      DESTRUCTION BENIGN LESIONS, OTHER THAN SKIN      Results From Past 48 Hours:  No results found for this or any previous visit (from the past 48 hour(s

## 2021-10-14 ENCOUNTER — TELEPHONE (OUTPATIENT)
Dept: PULMONOLOGY | Facility: CLINIC | Age: 86
End: 2021-10-14

## 2021-11-01 ENCOUNTER — HOSPITAL ENCOUNTER (OUTPATIENT)
Dept: CT IMAGING | Facility: HOSPITAL | Age: 86
Discharge: HOME | End: 2021-11-01
Attending: INTERNAL MEDICINE
Payer: MEDICARE

## 2021-11-01 DIAGNOSIS — R91.1 PULMONARY NODULE: ICD-10-CM

## 2021-11-01 PROCEDURE — 71250 CT THORAX DX C-: CPT | Performed by: INTERNAL MEDICINE

## 2021-11-03 ENCOUNTER — TELEPHONE (OUTPATIENT)
Dept: ENDOCRINOLOGY CLINIC | Facility: CLINIC | Age: 86
End: 2021-11-03

## 2021-11-05 NOTE — TELEPHONE ENCOUNTER
Patient will be due for next Prolia after 11/21/21. Patient has medicare part B insurance which covers 80% of cost if injection is handled as buy and bill and if injection is administered in physicians office.  Patient also has [employer] med sup plan-F which f

## 2021-11-12 ENCOUNTER — OFFICE VISIT (OUTPATIENT)
Dept: OTOLARYNGOLOGY | Facility: CLINIC | Age: 86
End: 2021-11-12
Payer: MEDICARE

## 2021-11-12 VITALS — TEMPERATURE: 96 F | HEIGHT: 62 IN | WEIGHT: 117 LBS | BODY MASS INDEX: 21.53 KG/M2

## 2021-11-12 DIAGNOSIS — R09.89 PHLEGM IN THROAT: ICD-10-CM

## 2021-11-12 DIAGNOSIS — J30.89 NON-SEASONAL ALLERGIC RHINITIS, UNSPECIFIED TRIGGER: Primary | ICD-10-CM

## 2021-11-12 PROCEDURE — 99213 OFFICE O/P EST LOW 20 MIN: CPT | Performed by: OTOLARYNGOLOGY

## 2021-11-12 RX ORDER — AZELASTINE 1 MG/ML
2 SPRAY, METERED NASAL 2 TIMES DAILY
Qty: 30 ML | Refills: 0 | Status: SHIPPED | OUTPATIENT
Start: 2021-11-12 | End: 2022-01-24

## 2021-11-12 RX ORDER — METHSCOPOLAMINE BROMIDE 2.5 MG/1
2.5 TABLET ORAL 2 TIMES DAILY
Qty: 60 TABLET | Refills: 3 | Status: SHIPPED | OUTPATIENT
Start: 2021-11-12

## 2021-11-12 NOTE — PROGRESS NOTES
Deonna Christopher is a 80year old female.   Patient presents with:  Sinus Problem: pt c/o of chronic congestion      HISTORY OF PRESENT ILLNESS   11/12/2021    The patient had previously seen Dr. 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 Septum deviated with turbinate hypertrophy bilaterally mucosa congestion.          Current Outpatient Medications:   •  Methscopolamine Bromide 2.5 MG Oral Tab, Take 1 tablet (2.5 mg total) by m that the nasal cavities make a quart of mucus a day and this is sort of clean out the nasal cavities this can feel thicker ones there is the presence of infection or any chronic irritation such as allergy or acid reflux in her case she does not drink enoug

## 2021-11-15 ENCOUNTER — TELEPHONE (OUTPATIENT)
Dept: OTOLARYNGOLOGY | Facility: CLINIC | Age: 86
End: 2021-11-15

## 2021-11-15 NOTE — TELEPHONE ENCOUNTER
A Prior  Authorization    Go[employer]/log  KEY; [id number]  LAST NAME          Current Outpatient Medications:   •  Methscopolamine Bromide 2.5 MG Oral Tab, Take 1 tablet (2.5 mg total) by mouth 2 (two) times daily.  As needed for excessive phlegm,

## 2021-11-22 ENCOUNTER — TELEPHONE (OUTPATIENT)
Dept: OTOLARYNGOLOGY | Facility: CLINIC | Age: 86
End: 2021-11-22

## 2021-11-23 ENCOUNTER — OFFICE VISIT (OUTPATIENT)
Dept: ENDOCRINOLOGY CLINIC | Facility: CLINIC | Age: 86
End: 2021-11-23
Payer: MEDICARE

## 2021-11-23 ENCOUNTER — TELEPHONE (OUTPATIENT)
Dept: OTOLARYNGOLOGY | Facility: CLINIC | Age: 86
End: 2021-11-23

## 2021-11-23 VITALS
BODY MASS INDEX: 21 KG/M2 | WEIGHT: 117 LBS | HEART RATE: 62 BPM | SYSTOLIC BLOOD PRESSURE: 132 MMHG | DIASTOLIC BLOOD PRESSURE: 79 MMHG

## 2021-11-23 DIAGNOSIS — M81.0 AGE-RELATED OSTEOPOROSIS WITHOUT CURRENT PATHOLOGICAL FRACTURE: Primary | ICD-10-CM

## 2021-11-23 PROCEDURE — 99213 OFFICE O/P EST LOW 20 MIN: CPT | Performed by: NURSE PRACTITIONER

## 2021-11-23 PROCEDURE — 96372 THER/PROPH/DIAG INJ SC/IM: CPT | Performed by: INTERNAL MEDICINE

## 2021-11-23 NOTE — TELEPHONE ENCOUNTER
.meds [id number]  PRIOR AUTH FOR PAMINE 2.0 MG # 60  TAKE 1 TABLET BY MOUTH TWICE DAILY [id number]::\"left ear\",\"aortic stenosis\"} NEEDED FOR EXCESSIVE PHLEGM  INSURANCE PLAN  MED RX-D GALLO LUCAS [facility] G  MEMBER ID [id number]  [address] [place], [place] [address]D  INS CONTACT # [phone]

## 2021-11-23 NOTE — PROGRESS NOTES
Name: Umesh Zhu  Date: 11/23/2021      Referring Physician: No ref. provider found    Patient presents with:   Follow - Up: osteoporosis-prolia      HISTORY OF PRESENT ILLNESS   Umesh Zhu is a 80year old female who presents for Patient presents DAILY IN THE MORNING BEFORE BREAKFAST, Disp: 90 capsule, Rfl: 0  •  denosumab (PROLIA) 60 MG/ML Subcutaneous Solution, Inject 60mg subcutaneously every 6 months for 2 years, Disp: 1 mL, Rfl: 0  •  aspirin 81 MG Oral Tab EC, Take  by mouth.  Aspir-81 81 mg t History:   Procedure Laterality Date   • ADENOIDECTOMY     • CATARACT Bilateral    • COLONOSCOPY     • D & C     • TONSILLECTOMY         PHYSICAL EXAMINATION:   11/23/21  0827   BP: 132/79   Pulse: 62   Weight: 117 lb (53.1 kg)       General Appearance:  A

## 2021-11-23 NOTE — PROGRESS NOTES
Patient at clinic for f/u with [facility] - 60mg injection of Prolia given and tolerated by patient - given to right upper arm  Patient stated understanding to schedule f/u with Dr. Roni Islsa and next Prolia injection after May 24, 2022

## 2021-11-29 ENCOUNTER — TELEPHONE (OUTPATIENT)
Dept: OTOLARYNGOLOGY | Facility: CLINIC | Age: 86
End: 2021-11-29

## 2021-11-30 ENCOUNTER — TELEPHONE (OUTPATIENT)
Dept: OTOLARYNGOLOGY | Facility: CLINIC | Age: 86
End: 2021-11-30

## 2021-11-30 RX ORDER — METHSCOPOLAMINE BROMIDE 2.5 MG/1
2.5 TABLET ORAL 2 TIMES DAILY
Qty: 60 TABLET | Refills: 0 | Status: SHIPPED | OUTPATIENT
Start: 2021-11-30 | End: 2021-11-30 | Stop reason: CLARIF

## 2021-11-30 NOTE — TELEPHONE ENCOUNTER
.meds [id number] NEEDS A PRIOR AUTH FOR METHSCOPOLAM 2.5 MG TABS   VISIT [employer]/PRIOR AUTHPORTAL AND ENTER TRX CODE [id number]  Use your EHR IF IT OFFERS ELECTRONIC PRIOR AUTHORIZATION  NON ELECTRONIC PROCESSING OPTIONS  IF YOU PREFER YOU MAY CONTA

## 2021-11-30 NOTE — TELEPHONE ENCOUNTER
Dr Ana Urena prior Donovan Blowers for Methscopolamine was denied,due Medicare only allow to cover when it is Part D medication approved by FDA,and this medication is not approved for pt medical condition,please advise for alternative.

## 2021-12-01 RX ORDER — GLYCOPYRROLATE 1 MG/1
1 TABLET ORAL 3 TIMES DAILY
Qty: 30 TABLET | Refills: 2 | Status: SHIPPED | OUTPATIENT
Start: 2021-12-01 | End: 2022-01-24

## 2021-12-02 NOTE — LETTER
12/28/17        [facility]      Dear Vania Ordoñez records indicate that you have outstanding lab work and or testing that was ordered for you and has not yet been completed:          Comp Metabolic Panel (14 known

## 2021-12-06 ENCOUNTER — TELEPHONE (OUTPATIENT)
Dept: OTOLARYNGOLOGY | Facility: CLINIC | Age: 86
End: 2021-12-06

## 2022-01-24 ENCOUNTER — OFFICE VISIT (OUTPATIENT)
Dept: OTOLARYNGOLOGY | Facility: CLINIC | Age: 87
End: 2022-01-24
Payer: MEDICARE

## 2022-01-24 DIAGNOSIS — J30.89 NON-SEASONAL ALLERGIC RHINITIS, UNSPECIFIED TRIGGER: Primary | ICD-10-CM

## 2022-01-24 DIAGNOSIS — K21.9 LARYNGOPHARYNGEAL REFLUX (LPR): ICD-10-CM

## 2022-01-24 DIAGNOSIS — R09.89 PHLEGM IN THROAT: ICD-10-CM

## 2022-01-24 PROCEDURE — 99213 OFFICE O/P EST LOW 20 MIN: CPT | Performed by: OTOLARYNGOLOGY

## 2022-01-24 RX ORDER — GLYCOPYRROLATE 1 MG/1
1 TABLET ORAL 3 TIMES DAILY
Qty: 30 TABLET | Refills: 2 | Status: SHIPPED | OUTPATIENT
Start: 2022-01-24

## 2022-01-24 RX ORDER — AZELASTINE 1 MG/ML
2 SPRAY, METERED NASAL 2 TIMES DAILY
Qty: 30 ML | Refills: 11 | Status: SHIPPED | OUTPATIENT
Start: 2022-01-24

## 2022-01-24 NOTE — PROGRESS NOTES
Jesus Almazan is a 80year old female. Patient presents with:   Follow - Up: patient presents with hoarseness,sinus drainage ,non stop running ,      HISTORY OF PRESENT ILLNESS   11/12/2021    The patient had previously seen Dr. Santana Sox she has had chronic of Spending [place] of Time in [place]: Yes        Bad sunburns in the past: No        Tanning Salons in the Past: No        Hx of Radiation Treatments: No      Family History   Problem Relation Age of Onset   • Heart Disorder Father    • Diabetes Mother Right: Normal, Left: Normal.  EOMI   Neurological Normal Memory - Normal. Cranial nerves - Cranial nerves II through XII grossly intact.  Gait is normal   Head/Face Normal Facial features - Normal. Eyebrows - Normal. Skull - Normal.             Ears Normal symptoms are sore throat, cough ,excessive throat clearing ,hoarseness, and rhinitis. Another common symptom is globus sensation. Only 50% of patients have heartburn as the presenting complaint.  I recommend a minimum of 6 weeks of a proton pump inhibitor a

## 2022-01-25 ENCOUNTER — TELEPHONE (OUTPATIENT)
Dept: OTOLARYNGOLOGY | Facility: CLINIC | Age: 87
End: 2022-01-25

## 2022-02-03 NOTE — TELEPHONE ENCOUNTER
Rn called [employer] [facility] #[id number] for prior auth medication Glycopyrrolate 1 mg  Reference#[id number].

## 2022-02-03 NOTE — TELEPHONE ENCOUNTER
Prior auth for glycopyrrolate was denied due the medication is not FDA approved for pt condition,not being used for medically accepted indication LMTCB to inform  pt and  advise if can use [facility] adam for cheaper price out of pocket.

## 2022-02-24 ENCOUNTER — OFFICE VISIT (OUTPATIENT)
Dept: OTOLARYNGOLOGY | Facility: CLINIC | Age: 87
End: 2022-02-24
Payer: MEDICARE

## 2022-02-24 VITALS — WEIGHT: 117 LBS | BODY MASS INDEX: 21.53 KG/M2 | HEIGHT: 62 IN

## 2022-02-24 DIAGNOSIS — R09.89 PHLEGM IN THROAT: Primary | ICD-10-CM

## 2022-02-24 PROCEDURE — 99213 OFFICE O/P EST LOW 20 MIN: CPT | Performed by: OTOLARYNGOLOGY

## 2022-02-24 RX ORDER — LEVOCETIRIZINE DIHYDROCHLORIDE 5 MG/1
5 TABLET, FILM COATED ORAL EVERY EVENING
Qty: 30 TABLET | Refills: 11 | Status: SHIPPED | OUTPATIENT
Start: 2022-02-24

## 2022-02-24 RX ORDER — OMEPRAZOLE 40 MG/1
40 CAPSULE, DELAYED RELEASE ORAL DAILY
Qty: 30 CAPSULE | Refills: 4 | Status: SHIPPED | OUTPATIENT
Start: 2022-02-24

## 2022-03-11 ENCOUNTER — OFFICE VISIT (OUTPATIENT)
Dept: INTERNAL MEDICINE CLINIC | Facility: CLINIC | Age: 87
End: 2022-03-11
Payer: MEDICARE

## 2022-03-11 VITALS
BODY MASS INDEX: 21.65 KG/M2 | HEIGHT: 62 IN | HEART RATE: 84 BPM | DIASTOLIC BLOOD PRESSURE: 80 MMHG | WEIGHT: 117.63 LBS | SYSTOLIC BLOOD PRESSURE: 150 MMHG | RESPIRATION RATE: 17 BRPM

## 2022-03-11 DIAGNOSIS — M81.0 AGE-RELATED OSTEOPOROSIS WITHOUT CURRENT PATHOLOGICAL FRACTURE: ICD-10-CM

## 2022-03-11 DIAGNOSIS — R68.2 DRY MOUTH: ICD-10-CM

## 2022-03-11 DIAGNOSIS — J84.10 LUNG GRANULOMA (HCC): ICD-10-CM

## 2022-03-11 DIAGNOSIS — Z00.00 PHYSICAL EXAM, ANNUAL: Primary | ICD-10-CM

## 2022-03-11 DIAGNOSIS — R53.83 OTHER FATIGUE: ICD-10-CM

## 2022-03-11 DIAGNOSIS — R91.8 PULMONARY NODULES: ICD-10-CM

## 2022-03-11 DIAGNOSIS — Z13.9 SCREENING FOR CONDITION: ICD-10-CM

## 2022-03-11 PROCEDURE — G0439 PPPS, SUBSEQ VISIT: HCPCS | Performed by: INTERNAL MEDICINE

## 2022-04-07 ENCOUNTER — TELEPHONE (OUTPATIENT)
Dept: PULMONOLOGY | Facility: CLINIC | Age: 87
End: 2022-04-07

## 2022-04-07 NOTE — TELEPHONE ENCOUNTER
Patient is due for Ct Chest in May ordered by Dr. Juan Pablo Davison in 11/9/2021. Letter sent to patient.

## 2022-04-27 ENCOUNTER — TELEPHONE (OUTPATIENT)
Dept: INTERNAL MEDICINE CLINIC | Facility: CLINIC | Age: 87
End: 2022-04-27

## 2022-04-27 NOTE — TELEPHONE ENCOUNTER
From Rico Cai's office visit note on 11/23/21:    Patient stated understanding to schedule f/u with Dr. Marcell Uribe and next Prolia injection after May 24, 2022     She verbalized understanding.

## 2022-05-02 ENCOUNTER — LAB ENCOUNTER (OUTPATIENT)
Dept: LAB | Facility: HOSPITAL | Age: 87
End: 2022-05-02
Attending: INTERNAL MEDICINE
Payer: MEDICARE

## 2022-05-02 ENCOUNTER — TELEPHONE (OUTPATIENT)
Dept: ENDOCRINOLOGY CLINIC | Facility: CLINIC | Age: 87
End: 2022-05-02

## 2022-05-02 ENCOUNTER — HOSPITAL ENCOUNTER (OUTPATIENT)
Dept: CT IMAGING | Facility: HOSPITAL | Age: 87
Discharge: HOME | End: 2022-05-02
Attending: INTERNAL MEDICINE
Payer: MEDICARE

## 2022-05-02 DIAGNOSIS — R68.2 DRY MOUTH: ICD-10-CM

## 2022-05-02 DIAGNOSIS — M81.0 AGE-RELATED OSTEOPOROSIS WITHOUT CURRENT PATHOLOGICAL FRACTURE: ICD-10-CM

## 2022-05-02 DIAGNOSIS — R91.1 PULMONARY NODULE: ICD-10-CM

## 2022-05-02 DIAGNOSIS — R53.83 OTHER FATIGUE: ICD-10-CM

## 2022-05-02 DIAGNOSIS — R91.8 PULMONARY NODULES: ICD-10-CM

## 2022-05-02 DIAGNOSIS — Z13.9 SCREENING FOR CONDITION: ICD-10-CM

## 2022-05-02 LAB
ANION GAP SERPL CALC-SCNC: 3 MMOL/L (ref 0–18)
BASOPHILS # BLD AUTO: 0.03 X10(3) UL (ref 0–0.2)
BASOPHILS NFR BLD AUTO: 0.7 %
BUN BLD-MCNC: 15 MG/DL (ref 7–18)
BUN/CREAT SERPL: 23.8 (ref 10–20)
CALCIUM BLD-MCNC: 9.1 MG/DL (ref 8.5–10.1)
CHLORIDE SERPL-SCNC: 106 MMOL/L (ref 98–112)
CO2 SERPL-SCNC: 33 MMOL/L (ref 21–32)
CREAT BLD-MCNC: 0.63 MG/DL
DEPRECATED RDW RBC AUTO: 46.5 FL (ref 35.1–46.3)
EOSINOPHIL # BLD AUTO: 0.13 X10(3) UL (ref 0–0.7)
EOSINOPHIL NFR BLD AUTO: 3.1 %
ERYTHROCYTE [DISTWIDTH] IN BLOOD BY AUTOMATED COUNT: 12.6 % (ref 11–15)
FASTING STATUS PATIENT QL REPORTED: YES
GLUCOSE BLD-MCNC: 88 MG/DL (ref 70–99)
HCT VFR BLD AUTO: 40.2 %
HGB BLD-MCNC: 13 G/DL
IMM GRANULOCYTES # BLD AUTO: 0.02 X10(3) UL (ref 0–1)
IMM GRANULOCYTES NFR BLD: 0.5 %
LYMPHOCYTES # BLD AUTO: 1.14 X10(3) UL (ref 1–4)
LYMPHOCYTES NFR BLD AUTO: 26.9 %
MCH RBC QN AUTO: 32.2 PG (ref 26–34)
MCHC RBC AUTO-ENTMCNC: 32.3 G/DL (ref 31–37)
MCV RBC AUTO: 99.5 FL
MONOCYTES # BLD AUTO: 0.46 X10(3) UL (ref 0.1–1)
MONOCYTES NFR BLD AUTO: 10.8 %
NEUTROPHILS # BLD AUTO: 2.46 X10 (3) UL (ref 1.5–7.7)
NEUTROPHILS # BLD AUTO: 2.46 X10(3) UL (ref 1.5–7.7)
NEUTROPHILS NFR BLD AUTO: 58 %
OSMOLALITY SERPL CALC.SUM OF ELEC: 294 MOSM/KG (ref 275–295)
PLATELET # BLD AUTO: 196 10(3)UL (ref 150–450)
POTASSIUM SERPL-SCNC: 4 MMOL/L (ref 3.5–5.1)
PTH-INTACT SERPL-MCNC: 40.2 PG/ML (ref 18.5–88)
RBC # BLD AUTO: 4.04 X10(6)UL
SODIUM SERPL-SCNC: 142 MMOL/L (ref 136–145)
TSI SER-ACNC: 2.64 MIU/ML (ref 0.36–3.74)
VIT B12 SERPL-MCNC: 652 PG/ML (ref 193–986)
VIT D+METAB SERPL-MCNC: 64 NG/ML (ref 30–100)
WBC # BLD AUTO: 4.2 X10(3) UL (ref 4–11)

## 2022-05-02 PROCEDURE — 82306 VITAMIN D 25 HYDROXY: CPT

## 2022-05-02 PROCEDURE — 71250 CT THORAX DX C-: CPT | Performed by: INTERNAL MEDICINE

## 2022-05-02 PROCEDURE — 36415 COLL VENOUS BLD VENIPUNCTURE: CPT

## 2022-05-02 PROCEDURE — 83970 ASSAY OF PARATHORMONE: CPT

## 2022-05-02 PROCEDURE — 84080 ASSAY ALKALINE PHOSPHATASES: CPT

## 2022-05-02 PROCEDURE — 82607 VITAMIN B-12: CPT

## 2022-05-02 PROCEDURE — 85025 COMPLETE CBC W/AUTO DIFF WBC: CPT

## 2022-05-02 PROCEDURE — 80048 BASIC METABOLIC PNL TOTAL CA: CPT

## 2022-05-02 PROCEDURE — 84443 ASSAY THYROID STIM HORMONE: CPT

## 2022-05-02 NOTE — PROGRESS NOTES
Please call and inform patient that her labs for osteoporosis are back and and they are stable. She will be due for another Prolia injection on May 24th, however I do not see that she has any apts already scheduled. Patient may make this upcoming apt with either me or RN. Please help patient make this apt. Thank you!

## 2022-05-02 NOTE — TELEPHONE ENCOUNTER
rn called patient with message by Von Sandhoff   Due for Prolia May 24 , labs stable   Patient said she will have to call back to book adam as she needs to check calendar and call us back    Please book pt with [facility]  np or nurse .

## 2022-05-03 LAB — BONE SPECIFIC ALKALINE PHOSPHATASE: 8.2 UG/L

## 2022-05-06 ENCOUNTER — TELEPHONE (OUTPATIENT)
Dept: PULMONOLOGY | Facility: CLINIC | Age: 87
End: 2022-05-06

## 2022-05-06 NOTE — TELEPHONE ENCOUNTER
Spoke to patient and relayed Dr. Cierra Malave message as shown below. Patient verbalized understanding of whole message and had no further questions at this time. CT scan order entered and added to calendar.

## 2022-05-06 NOTE — TELEPHONE ENCOUNTER
----- Message from Vee Ruiz MD sent at 5/4/2022  3:23 PM CDT -----  RN, please call the patient to tell her that the CT scan is improved and that 2 nodules have resolved. Another has lingering unchanged.   Please add to the calendar a repeat CT scan of the chest at the 1 year interval.  Patricia Agarwal

## 2022-06-07 ENCOUNTER — TELEPHONE (OUTPATIENT)
Dept: ENDOCRINOLOGY CLINIC | Facility: CLINIC | Age: 87
End: 2022-06-07

## 2022-06-10 NOTE — TELEPHONE ENCOUNTER
Per LOV with Kavon Murdock, pt's last Prolia injection was on 11/23/21 and is due on or after 5/23/22 for next injection. Dr. Home Partida your first available isn't until late August okay to add sooner on a res 24?      Please advise

## 2022-06-13 ENCOUNTER — NURSE ONLY (OUTPATIENT)
Dept: ENDOCRINOLOGY CLINIC | Facility: CLINIC | Age: 87
End: 2022-06-13
Payer: MEDICARE

## 2022-06-13 DIAGNOSIS — M81.0 AGE-RELATED OSTEOPOROSIS WITHOUT CURRENT PATHOLOGICAL FRACTURE: Primary | ICD-10-CM

## 2022-06-13 PROCEDURE — 96372 THER/PROPH/DIAG INJ SC/IM: CPT | Performed by: NURSE PRACTITIONER

## 2022-06-13 NOTE — PROGRESS NOTES
Patient presents to clinic today for Prolia (denosumab) 60 mg/mL in right upper arm. Patient tolerated well. Pt understands to return in 6 months for next injection.

## 2022-09-12 ENCOUNTER — TELEPHONE (OUTPATIENT)
Dept: INTERNAL MEDICINE CLINIC | Facility: CLINIC | Age: 87
End: 2022-09-12

## 2022-09-12 NOTE — TELEPHONE ENCOUNTER
Please call patient I would like to see patient sooner so we can address all her concerns and I can order proper lab tests she had some of the test done in May and they were normal, please see below

## 2022-09-19 ENCOUNTER — OFFICE VISIT (OUTPATIENT)
Dept: INTERNAL MEDICINE CLINIC | Facility: CLINIC | Age: 87
End: 2022-09-19

## 2022-09-19 VITALS
WEIGHT: 117 LBS | SYSTOLIC BLOOD PRESSURE: 150 MMHG | DIASTOLIC BLOOD PRESSURE: 76 MMHG | OXYGEN SATURATION: 98 % | HEART RATE: 57 BPM | HEIGHT: 62 IN | BODY MASS INDEX: 21.53 KG/M2

## 2022-09-19 DIAGNOSIS — R91.8 PULMONARY NODULES: ICD-10-CM

## 2022-09-19 DIAGNOSIS — R53.83 OTHER FATIGUE: ICD-10-CM

## 2022-09-19 DIAGNOSIS — M81.8 OTHER OSTEOPOROSIS WITHOUT CURRENT PATHOLOGICAL FRACTURE: Primary | ICD-10-CM

## 2022-09-19 PROCEDURE — 99213 OFFICE O/P EST LOW 20 MIN: CPT | Performed by: INTERNAL MEDICINE

## 2022-09-19 PROCEDURE — 1126F AMNT PAIN NOTED NONE PRSNT: CPT | Performed by: INTERNAL MEDICINE

## 2022-10-21 ENCOUNTER — LAB ENCOUNTER (OUTPATIENT)
Dept: LAB | Facility: HOSPITAL | Age: 87
End: 2022-10-21
Attending: INTERNAL MEDICINE
Payer: MEDICARE

## 2022-10-21 DIAGNOSIS — R53.83 OTHER FATIGUE: ICD-10-CM

## 2022-10-21 DIAGNOSIS — M81.8 OTHER OSTEOPOROSIS WITHOUT CURRENT PATHOLOGICAL FRACTURE: ICD-10-CM

## 2022-10-21 LAB
ALBUMIN SERPL-MCNC: 3.4 G/DL (ref 3.4–5)
ALP LIVER SERPL-CCNC: 74 U/L
ALT SERPL-CCNC: 30 U/L
AST SERPL-CCNC: 21 U/L (ref 15–37)
BASOPHILS # BLD AUTO: 0.03 X10(3) UL (ref 0–0.2)
BASOPHILS NFR BLD AUTO: 0.7 %
BILIRUB DIRECT SERPL-MCNC: 0.2 MG/DL (ref 0–0.2)
BILIRUB SERPL-MCNC: 0.6 MG/DL (ref 0.1–2)
DEPRECATED RDW RBC AUTO: 46.8 FL (ref 35.1–46.3)
EOSINOPHIL # BLD AUTO: 0.19 X10(3) UL (ref 0–0.7)
EOSINOPHIL NFR BLD AUTO: 4.4 %
ERYTHROCYTE [DISTWIDTH] IN BLOOD BY AUTOMATED COUNT: 12.9 % (ref 11–15)
HCT VFR BLD AUTO: 39.1 %
HGB BLD-MCNC: 12.9 G/DL
IMM GRANULOCYTES # BLD AUTO: 0.02 X10(3) UL (ref 0–1)
IMM GRANULOCYTES NFR BLD: 0.5 %
LYMPHOCYTES # BLD AUTO: 1.29 X10(3) UL (ref 1–4)
LYMPHOCYTES NFR BLD AUTO: 29.9 %
MCH RBC QN AUTO: 32.7 PG (ref 26–34)
MCHC RBC AUTO-ENTMCNC: 33 G/DL (ref 31–37)
MCV RBC AUTO: 99.2 FL
MONOCYTES # BLD AUTO: 0.58 X10(3) UL (ref 0.1–1)
MONOCYTES NFR BLD AUTO: 13.4 %
NEUTROPHILS # BLD AUTO: 2.21 X10 (3) UL (ref 1.5–7.7)
NEUTROPHILS # BLD AUTO: 2.21 X10(3) UL (ref 1.5–7.7)
NEUTROPHILS NFR BLD AUTO: 51.1 %
PLATELET # BLD AUTO: 197 10(3)UL (ref 150–450)
PROT SERPL-MCNC: 6.3 G/DL (ref 6.4–8.2)
RBC # BLD AUTO: 3.94 X10(6)UL
WBC # BLD AUTO: 4.3 X10(3) UL (ref 4–11)

## 2022-10-21 PROCEDURE — 36415 COLL VENOUS BLD VENIPUNCTURE: CPT

## 2022-10-21 PROCEDURE — 85025 COMPLETE CBC W/AUTO DIFF WBC: CPT

## 2022-10-21 PROCEDURE — 80076 HEPATIC FUNCTION PANEL: CPT

## 2022-11-16 ENCOUNTER — HOSPITAL ENCOUNTER (OUTPATIENT)
Dept: BONE DENSITY | Facility: HOSPITAL | Age: 87
Discharge: HOME | End: 2022-11-16
Attending: NURSE PRACTITIONER
Payer: MEDICARE

## 2022-11-16 DIAGNOSIS — M81.0 AGE-RELATED OSTEOPOROSIS WITHOUT CURRENT PATHOLOGICAL FRACTURE: ICD-10-CM

## 2022-11-16 PROCEDURE — 77080 DXA BONE DENSITY AXIAL: CPT | Performed by: NURSE PRACTITIONER

## 2022-11-23 ENCOUNTER — TELEPHONE (OUTPATIENT)
Dept: ENDOCRINOLOGY CLINIC | Facility: CLINIC | Age: 87
End: 2022-11-23

## 2022-11-23 NOTE — TELEPHONE ENCOUNTER
Pt's last Prolia injection was on 6/13/22. Pt will be due for next injection on or after 12/13/22.      Pt has an upcoming appt on 12/16/22 for Prolia Injection with MD    ----    Submitted Prolia IV via [employer] portal  Awaiting SOB, 3-5 business days

## 2022-11-28 NOTE — TELEPHONE ENCOUNTER
Received pt's Prolia SOB via [employer] portal    PA Required: No  Prolia OOP COST: $0.00  Facility Fee: $0  Admin Fee: $0.00

## 2022-12-12 ENCOUNTER — OFFICE VISIT (OUTPATIENT)
Dept: ENDOCRINOLOGY CLINIC | Facility: CLINIC | Age: 87
End: 2022-12-12
Payer: MEDICARE

## 2022-12-12 VITALS
WEIGHT: 120 LBS | DIASTOLIC BLOOD PRESSURE: 75 MMHG | HEART RATE: 67 BPM | BODY MASS INDEX: 22 KG/M2 | SYSTOLIC BLOOD PRESSURE: 169 MMHG

## 2022-12-12 DIAGNOSIS — M81.0 AGE-RELATED OSTEOPOROSIS WITHOUT CURRENT PATHOLOGICAL FRACTURE: Primary | ICD-10-CM

## 2023-02-09 ENCOUNTER — OFFICE VISIT (OUTPATIENT)
Dept: PODIATRY CLINIC | Facility: CLINIC | Age: 88
End: 2023-02-09

## 2023-02-09 DIAGNOSIS — B35.1 ONYCHOMYCOSIS: ICD-10-CM

## 2023-02-09 DIAGNOSIS — L60.0 INGROWN TOENAIL: ICD-10-CM

## 2023-02-09 DIAGNOSIS — M79.672 PAIN IN BOTH FEET: Primary | ICD-10-CM

## 2023-02-09 DIAGNOSIS — M79.671 PAIN IN BOTH FEET: Primary | ICD-10-CM

## 2023-02-09 PROCEDURE — 99203 OFFICE O/P NEW LOW 30 MIN: CPT | Performed by: STUDENT IN AN ORGANIZED HEALTH CARE EDUCATION/TRAINING PROGRAM

## 2023-02-09 RX ORDER — CLOTRIMAZOLE 1 G/ML
1 SOLUTION TOPICAL 2 TIMES DAILY
Qty: 60 ML | Refills: 3 | Status: SHIPPED | OUTPATIENT
Start: 2023-02-09

## 2023-02-13 ENCOUNTER — OFFICE VISIT (OUTPATIENT)
Dept: INTERNAL MEDICINE CLINIC | Facility: CLINIC | Age: 88
End: 2023-02-13

## 2023-02-13 ENCOUNTER — HOSPITAL ENCOUNTER (OUTPATIENT)
Dept: GENERAL RADIOLOGY | Facility: HOSPITAL | Age: 88
Discharge: HOME | End: 2023-02-13
Attending: NURSE PRACTITIONER
Payer: MEDICARE

## 2023-02-13 ENCOUNTER — LAB ENCOUNTER (OUTPATIENT)
Dept: LAB | Facility: HOSPITAL | Age: 88
End: 2023-02-13
Attending: NURSE PRACTITIONER
Payer: MEDICARE

## 2023-02-13 VITALS
OXYGEN SATURATION: 92 % | SYSTOLIC BLOOD PRESSURE: 148 MMHG | HEART RATE: 72 BPM | TEMPERATURE: 99 F | HEIGHT: 62 IN | BODY MASS INDEX: 21.53 KG/M2 | WEIGHT: 117 LBS | DIASTOLIC BLOOD PRESSURE: 80 MMHG

## 2023-02-13 DIAGNOSIS — J06.9 VIRAL UPPER RESPIRATORY TRACT INFECTION: ICD-10-CM

## 2023-02-13 DIAGNOSIS — J06.9 VIRAL UPPER RESPIRATORY TRACT INFECTION: Primary | ICD-10-CM

## 2023-02-13 DIAGNOSIS — I10 PRIMARY HYPERTENSION: ICD-10-CM

## 2023-02-13 LAB — SARS-COV-2 RNA RESP QL NAA+PROBE: NOT DETECTED

## 2023-02-13 PROCEDURE — 99214 OFFICE O/P EST MOD 30 MIN: CPT | Performed by: NURSE PRACTITIONER

## 2023-02-13 PROCEDURE — 71046 X-RAY EXAM CHEST 2 VIEWS: CPT | Performed by: NURSE PRACTITIONER

## 2023-02-13 RX ORDER — BENZONATATE 100 MG/1
100 CAPSULE ORAL 3 TIMES DAILY PRN
Qty: 20 CAPSULE | Refills: 0 | Status: SHIPPED | OUTPATIENT
Start: 2023-02-13 | End: 2023-02-20

## 2023-02-13 NOTE — ASSESSMENT & PLAN NOTE
Patient started with URI on Friday. Does not appear acutely ill. Mild cold symptoms with a congested cough. Plan  Hydrate with fluids  Drink Hot tea with lemon  Drink Hot tea with honey  Gargle with warm salt water if you have a sore throat. Benzonatate 100 mg po three times per day as needed for cough.   COVID test  Chest X-ray- Hx of pneumonia

## 2023-02-27 ENCOUNTER — OFFICE VISIT (OUTPATIENT)
Dept: OTOLARYNGOLOGY | Facility: CLINIC | Age: 88
End: 2023-02-27

## 2023-02-27 VITALS — WEIGHT: 117 LBS | BODY MASS INDEX: 21 KG/M2

## 2023-02-27 DIAGNOSIS — R09.82 ALLERGIC RHINITIS WITH POSTNASAL DRIP: ICD-10-CM

## 2023-02-27 DIAGNOSIS — J30.89 NON-SEASONAL ALLERGIC RHINITIS, UNSPECIFIED TRIGGER: ICD-10-CM

## 2023-02-27 DIAGNOSIS — K21.9 GASTRIC REFLUX SYNDROME: Primary | ICD-10-CM

## 2023-02-27 DIAGNOSIS — J34.2 NASAL SEPTAL DEVIATION: ICD-10-CM

## 2023-02-27 DIAGNOSIS — J30.9 ALLERGIC RHINITIS WITH POSTNASAL DRIP: ICD-10-CM

## 2023-02-27 PROCEDURE — 31231 NASAL ENDOSCOPY DX: CPT | Performed by: SPECIALIST

## 2023-02-27 PROCEDURE — 99213 OFFICE O/P EST LOW 20 MIN: CPT | Performed by: SPECIALIST

## 2023-02-27 RX ORDER — IPRATROPIUM BROMIDE 42 UG/1
2 SPRAY, METERED NASAL 3 TIMES DAILY
Qty: 15 ML | Refills: 5 | Status: SHIPPED | OUTPATIENT
Start: 2023-02-27

## 2023-02-27 NOTE — PATIENT INSTRUCTIONS
Please take the levocetirizine at bedtime and omeprazole in the morning. Follow-up in 4 weeks time, sooner if problems. If symptoms persist, will consider a trial of Singulair.

## 2023-03-22 ENCOUNTER — OFFICE VISIT (OUTPATIENT)
Dept: INTERNAL MEDICINE CLINIC | Facility: CLINIC | Age: 88
End: 2023-03-22

## 2023-03-22 VITALS
BODY MASS INDEX: 21.53 KG/M2 | HEART RATE: 67 BPM | HEIGHT: 62 IN | WEIGHT: 117 LBS | DIASTOLIC BLOOD PRESSURE: 82 MMHG | OXYGEN SATURATION: 98 % | SYSTOLIC BLOOD PRESSURE: 136 MMHG

## 2023-03-22 DIAGNOSIS — M81.0 AGE-RELATED OSTEOPOROSIS WITHOUT CURRENT PATHOLOGICAL FRACTURE: Primary | ICD-10-CM

## 2023-03-22 DIAGNOSIS — K21.9 GASTRIC REFLUX SYNDROME: ICD-10-CM

## 2023-03-22 DIAGNOSIS — R91.1 PULMONARY NODULE: ICD-10-CM

## 2023-03-22 DIAGNOSIS — J84.10 LUNG GRANULOMA (HCC): ICD-10-CM

## 2023-03-22 DIAGNOSIS — R53.83 OTHER FATIGUE: ICD-10-CM

## 2023-03-22 DIAGNOSIS — J37.0 CHRONIC LARYNGITIS: ICD-10-CM

## 2023-04-05 ENCOUNTER — TELEPHONE (OUTPATIENT)
Dept: PULMONOLOGY | Facility: CLINIC | Age: 88
End: 2023-04-05

## 2023-04-10 ENCOUNTER — OFFICE VISIT (OUTPATIENT)
Dept: OTOLARYNGOLOGY | Facility: CLINIC | Age: 88
End: 2023-04-10

## 2023-04-10 DIAGNOSIS — K21.9 GASTRIC REFLUX SYNDROME: Primary | ICD-10-CM

## 2023-04-10 DIAGNOSIS — J30.9 ALLERGIC RHINITIS WITH POSTNASAL DRIP: ICD-10-CM

## 2023-04-10 DIAGNOSIS — R09.82 ALLERGIC RHINITIS WITH POSTNASAL DRIP: ICD-10-CM

## 2023-04-10 DIAGNOSIS — J34.2 NASAL SEPTAL DEVIATION: ICD-10-CM

## 2023-04-10 PROCEDURE — 99213 OFFICE O/P EST LOW 20 MIN: CPT | Performed by: SPECIALIST

## 2023-04-10 NOTE — PATIENT INSTRUCTIONS
You can use the over-the-counter omeprazole 20 mg 1 pill 30 minutes before breakfast.  Continue antireflux diet. No greasy foods, spicy foods, chocolate, caffeine, alcohol, spearmint, peppermint, lemon, lime, orange, grapefruit, tomatoes. Don't eat 2 hours before bedtime  Elevate the head of bed   Restart Xyzal 1 pill at bedtime. Follow-up in 6 months time, sooner if problems.

## 2023-04-12 ENCOUNTER — LAB ENCOUNTER (OUTPATIENT)
Dept: LAB | Facility: HOSPITAL | Age: 88
End: 2023-04-12
Attending: INTERNAL MEDICINE
Payer: MEDICARE

## 2023-04-12 DIAGNOSIS — R53.83 OTHER FATIGUE: ICD-10-CM

## 2023-04-12 DIAGNOSIS — M81.0 AGE-RELATED OSTEOPOROSIS WITHOUT CURRENT PATHOLOGICAL FRACTURE: ICD-10-CM

## 2023-04-12 LAB
ALBUMIN SERPL-MCNC: 3.5 G/DL (ref 3.4–5)
ALBUMIN/GLOB SERPL: 1.3 {RATIO} (ref 1–2)
ALP LIVER SERPL-CCNC: 64 U/L
ALT SERPL-CCNC: 34 U/L
ANION GAP SERPL CALC-SCNC: 8 MMOL/L (ref 0–18)
AST SERPL-CCNC: 24 U/L (ref 15–37)
BASOPHILS # BLD AUTO: 0.03 X10(3) UL (ref 0–0.2)
BASOPHILS NFR BLD AUTO: 0.7 %
BILIRUB SERPL-MCNC: 1 MG/DL (ref 0.1–2)
BUN BLD-MCNC: 13 MG/DL (ref 7–18)
BUN/CREAT SERPL: 20.3 (ref 10–20)
CALCIUM BLD-MCNC: 8.8 MG/DL (ref 8.5–10.1)
CHLORIDE SERPL-SCNC: 107 MMOL/L (ref 98–112)
CO2 SERPL-SCNC: 26 MMOL/L (ref 21–32)
CREAT BLD-MCNC: 0.64 MG/DL
DEPRECATED RDW RBC AUTO: 47.6 FL (ref 35.1–46.3)
EOSINOPHIL # BLD AUTO: 0.23 X10(3) UL (ref 0–0.7)
EOSINOPHIL NFR BLD AUTO: 5.6 %
ERYTHROCYTE [DISTWIDTH] IN BLOOD BY AUTOMATED COUNT: 13.2 % (ref 11–15)
FASTING STATUS PATIENT QL REPORTED: YES
GFR SERPLBLD BASED ON 1.73 SQ M-ARVRAT: 83 ML/MIN/1.73M2 (ref 60–?)
GLOBULIN PLAS-MCNC: 2.8 G/DL (ref 2.8–4.4)
GLUCOSE BLD-MCNC: 97 MG/DL (ref 70–99)
HCT VFR BLD AUTO: 40.2 %
HGB BLD-MCNC: 13 G/DL
IMM GRANULOCYTES # BLD AUTO: 0.01 X10(3) UL (ref 0–1)
IMM GRANULOCYTES NFR BLD: 0.2 %
LYMPHOCYTES # BLD AUTO: 1.31 X10(3) UL (ref 1–4)
LYMPHOCYTES NFR BLD AUTO: 32 %
MCH RBC QN AUTO: 31.9 PG (ref 26–34)
MCHC RBC AUTO-ENTMCNC: 32.3 G/DL (ref 31–37)
MCV RBC AUTO: 98.8 FL
MONOCYTES # BLD AUTO: 0.49 X10(3) UL (ref 0.1–1)
MONOCYTES NFR BLD AUTO: 12 %
NEUTROPHILS # BLD AUTO: 2.02 X10 (3) UL (ref 1.5–7.7)
NEUTROPHILS # BLD AUTO: 2.02 X10(3) UL (ref 1.5–7.7)
NEUTROPHILS NFR BLD AUTO: 49.5 %
OSMOLALITY SERPL CALC.SUM OF ELEC: 292 MOSM/KG (ref 275–295)
PLATELET # BLD AUTO: 216 10(3)UL (ref 150–450)
POTASSIUM SERPL-SCNC: 3.8 MMOL/L (ref 3.5–5.1)
PROT SERPL-MCNC: 6.3 G/DL (ref 6.4–8.2)
RBC # BLD AUTO: 4.07 X10(6)UL
SODIUM SERPL-SCNC: 141 MMOL/L (ref 136–145)
WBC # BLD AUTO: 4.1 X10(3) UL (ref 4–11)

## 2023-04-12 PROCEDURE — 80053 COMPREHEN METABOLIC PANEL: CPT

## 2023-04-12 PROCEDURE — 36415 COLL VENOUS BLD VENIPUNCTURE: CPT

## 2023-04-12 PROCEDURE — 85025 COMPLETE CBC W/AUTO DIFF WBC: CPT

## 2023-05-02 ENCOUNTER — HOSPITAL ENCOUNTER (OUTPATIENT)
Dept: CT IMAGING | Facility: HOSPITAL | Age: 88
Discharge: HOME | End: 2023-05-02
Attending: INTERNAL MEDICINE
Payer: MEDICARE

## 2023-05-02 DIAGNOSIS — R91.1 PULMONARY NODULE: ICD-10-CM

## 2023-05-02 PROCEDURE — 71250 CT THORAX DX C-: CPT | Performed by: INTERNAL MEDICINE

## 2023-05-05 DIAGNOSIS — R91.8 PULMONARY NODULES: Primary | ICD-10-CM

## 2023-06-05 ENCOUNTER — TELEPHONE (OUTPATIENT)
Dept: OTOLARYNGOLOGY | Facility: CLINIC | Age: 88
End: 2023-06-05

## 2023-06-06 RX ORDER — LEVOCETIRIZINE DIHYDROCHLORIDE 5 MG/1
5 TABLET, FILM COATED ORAL EVERY EVENING
Qty: 90 TABLET | Refills: 3 | Status: SHIPPED | OUTPATIENT
Start: 2023-06-06

## 2023-10-16 ENCOUNTER — OFFICE VISIT (OUTPATIENT)
Dept: OTOLARYNGOLOGY | Facility: CLINIC | Age: 88
End: 2023-10-16

## 2023-10-16 DIAGNOSIS — R09.82 ALLERGIC RHINITIS WITH POSTNASAL DRIP: ICD-10-CM

## 2023-10-16 DIAGNOSIS — J30.9 ALLERGIC RHINITIS WITH POSTNASAL DRIP: ICD-10-CM

## 2023-10-16 DIAGNOSIS — J34.2 NASAL SEPTAL DEVIATION: ICD-10-CM

## 2023-10-16 DIAGNOSIS — K21.9 GASTRIC REFLUX SYNDROME: Primary | ICD-10-CM

## 2023-10-16 PROCEDURE — 99213 OFFICE O/P EST LOW 20 MIN: CPT | Performed by: SPECIALIST

## 2023-10-16 RX ORDER — FAMOTIDINE 20 MG/1
20 TABLET, FILM COATED ORAL NIGHTLY PRN
Qty: 30 TABLET | Refills: 5 | Status: SHIPPED | OUTPATIENT
Start: 2023-10-16 | End: 2023-11-15

## 2023-10-16 NOTE — PATIENT INSTRUCTIONS
Please start the Xyzal at bedtime. The allergy season should be for the next 2 to 3 weeks. You can also use it to dry your nose. I gave you an antireflux dietNo greasy foods, spicy foods, chocolate, caffeine, alcohol, spearmint, peppermint, lemon, lime, orange, grapefruit, tomatoes. Don't eat 2 hours before bedtime  Elevate the head of bed and sent in a prescription for famotidine.

## 2023-10-16 NOTE — PROGRESS NOTES
Arsh Ch is a 80year old female. Patient presents with:  Nose Problem: Routine sinus check up    HPI:   Patient here for a checkup. He has not been using the levo cetirizine. He has not been taking the omeprazole over-the-counter. Current Outpatient Medications   Medication Sig Dispense Refill    famotidine 20 MG Oral Tab Take 1 tablet (20 mg total) by mouth nightly as needed for Heartburn. 30 tablet 5    levocetirizine 5 MG Oral Tab Take 1 tablet (5 mg total) by mouth every evening. 90 tablet 3    ipratropium 0.06 % Nasal Solution 2 sprays by Nasal route 3 (three) times daily. 15 mL 5    clotrimazole 1 % External Solution Apply 1 Application. topically 2 (two) times daily. 60 mL 3    Azelastine HCl 0.1 % Nasal Solution 2 sprays by Nasal route 2 (two) times daily. 30 mL 11    brimonidine Tartrate 0.2 % Ophthalmic Solution       Dorzolamide HCl-Timolol Mal 22.3-6.8 MG/ML Ophthalmic Solution INSTILL 1 DROP INTO LEFT EYE TWICE DAILY FOR 30 DAYS      aspirin 81 MG Oral Tab EC Take  by mouth. Aspir-81 81 mg tablet,delayed release      Calcium Carbonate-Vitamin D 500-200 MG-UNIT Oral Tab Take  by mouth. Calcium 500 + D 500 mg (1,250 mg)-200 unit tablet      Multiple Vitamins-Minerals (CENTRUM SILVER) Oral Tab Take  by mouth. take 1 tablet by oral route  every day      Omega-3 Fatty Acids (RA FISH OIL) 1000 MG Oral Cap Take  by mouth. 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    Osteoporosis     Other and unspecified hyperlipidemia       Social History:  Social History     Socioeconomic History    Marital status:    Tobacco Use    Smoking status: Never    Smokeless tobacco: Never   Vaping Use    Vaping Use: Never used   Substance and Sexual Activity    Alcohol use:  Yes     Alcohol/week: 0.8 standard drinks of alcohol Types: 1 Glasses of wine per week     Comment: 1 drink weekly    Drug use: No   Other Topics Concern    History of tanning No    Pt has a pacemaker No    Pt has a defibrillator No    Reaction to local anesthetic No     Comment: \"Puts me out\"    Caffeine Concern Yes     Comment: Coffee, 5 cups daily; Left Handed No    Right Handed Yes    Currently spends a great deal of time in the sun No    Hx of Spending [place] of Time in Sun Yes    Bad sunburns in the past No    Tanning Salons in the Past No    Hx of Radiation Treatments No        REVIEW OF SYSTEMS:   GENERAL HEALTH: feels well otherwise  GENERAL : denies fever, chills, sweats, weight loss, weight gain  SKIN: denies any unusual skin lesions or rashes  RESPIRATORY: denies shortness of breath with exertion  NEURO: denies headaches    EXAM:   There were no vitals taken for this visit. System Details   Skin Inspection - Normal.   Constitutional Overall appearance - Normal.   Head/Face Facial features - Normal. Eyebrows - Normal. Skull - Normal.   Eyes Conjunctiva - Right: Normal, Left: Normal. Pupil - Right: Normal, Left: Normal.    Ears Inspection - Right: Normal, Left: Normal.   Canal - Right: Normal, Left: Normal.   TM - Right: Normal, Left: Normal.   Nasal External nose - Normal.   Nasal septum -to the left  Turbinates -congestion and clear postnasal drip   Oral/Oropharynx Lips - Normal, Tonsils - Normal, Tongue - Normal    Neck Exam Inspection - Normal. Palpation - Normal. Parotid gland - Normal. Thyroid gland - Normal.   Larynx Mirror examination with posterior laryngeal erythema. No retained secretions. Normal vocal cord mobility. Cannot assess the anterior larynx via mirror examination. Lymph Detail Submental. Submandibular. Anterior cervical. Posterior cervical. Supraclavicular all without enlargement   Psychiatric Orientation - Oriented to time, place, person & situation. Appropriate mood and affect.    Neurological Memory - Normal. Cranial nerves - Cranial nerves II through XII grossly intact. ASSESSMENT AND PLAN:   1. Gastric reflux syndrome  Patient given antireflux diet and placed on a trial of famotidine at bedtime    2. Allergic rhinitis with postnasal drip  Patient picked up the prescription of Xyzal but has not started it. Patient to start Xyzal.    3. Nasal septal deviation  To the left. The patient indicates understanding of these issues and agrees to the plan.       Ada Wahl MD  10/16/2023  9:40 AM

## 2024-01-15 ENCOUNTER — OFFICE VISIT (OUTPATIENT)
Dept: OTOLARYNGOLOGY | Facility: CLINIC | Age: 89
End: 2024-01-15

## 2024-01-15 VITALS — BODY MASS INDEX: 21.71 KG/M2 | WEIGHT: 118 LBS | HEIGHT: 62 IN

## 2024-01-15 DIAGNOSIS — J34.3 NASAL TURBINATE HYPERTROPHY: ICD-10-CM

## 2024-01-15 DIAGNOSIS — K21.9 GASTROESOPHAGEAL REFLUX DISEASE, UNSPECIFIED WHETHER ESOPHAGITIS PRESENT: ICD-10-CM

## 2024-01-15 DIAGNOSIS — J34.2 NASAL SEPTAL DEVIATION: Primary | ICD-10-CM

## 2024-01-15 PROCEDURE — 99213 OFFICE O/P EST LOW 20 MIN: CPT | Performed by: SPECIALIST

## 2024-01-15 RX ORDER — FAMOTIDINE 40 MG/1
40 TABLET, FILM COATED ORAL DAILY
Qty: 30 TABLET | Refills: 5 | Status: SHIPPED | OUTPATIENT
Start: 2024-01-15

## 2024-01-15 NOTE — PATIENT INSTRUCTIONS
Please restart your famotidine for your reflux disease.  You can also do a reflux diet.  No greasy foods, spicy foods, chocolate, caffeine, alcohol, spearmint, peppermint, lemon, lime, orange, grapefruit, tomatoes.  Don't eat 2 hours before bedtime  Elevate the head of bed   Restart the Zyrtec at bedtime for the congestion and postnasal drip.  Follow-up in 3 months time, sooner if problems.

## 2024-01-15 NOTE — PROGRESS NOTES
Ladan Rinaldi is a 89 year old female.   Chief Complaint   Patient presents with    Follow - Up     Gastric reflux syndrome     HPI:   Patient here for follow-up on her reflux symptoms.  He has not been taking the famotidine.  Also reports sinus congestion.    Current Outpatient Medications   Medication Sig Dispense Refill    famotidine 40 MG Oral Tab Take 1 tablet (40 mg total) by mouth daily. 30 tablet 5    levocetirizine 5 MG Oral Tab Take 1 tablet (5 mg total) by mouth every evening. 90 tablet 3    ipratropium 0.06 % Nasal Solution 2 sprays by Nasal route 3 (three) times daily. 15 mL 5    clotrimazole 1 % External Solution Apply 1 Application. topically 2 (two) times daily. 60 mL 3    Azelastine HCl 0.1 % Nasal Solution 2 sprays by Nasal route 2 (two) times daily. 30 mL 11    brimonidine Tartrate 0.2 % Ophthalmic Solution       Dorzolamide HCl-Timolol Mal 22.3-6.8 MG/ML Ophthalmic Solution INSTILL 1 DROP INTO LEFT EYE TWICE DAILY FOR 30 DAYS      aspirin 81 MG Oral Tab EC Take  by mouth. Aspir-81 81 mg tablet,delayed release      Calcium Carbonate-Vitamin D 500-200 MG-UNIT Oral Tab Take  by mouth. Calcium 500 + D 500 mg (1,250 mg)-200 unit tablet      Multiple Vitamins-Minerals (CENTRUM SILVER) Oral Tab Take  by mouth. take 1 tablet by oral route  every day      Omega-3 Fatty Acids (RA FISH OIL) 1000 MG Oral Cap Take  by mouth. FISH OIL (unknown strength)        Past Medical History:   Diagnosis Date    Atypical nevus 2012    skin of right cheek    Cancer (HCC)     Melanoma in situ of cheek (HCC) 2012    \"Malignant melanoma in situ - Right cheek    Osteoporosis     Other and unspecified hyperlipidemia       Social History:  Social History     Socioeconomic History    Marital status:    Tobacco Use    Smoking status: Never    Smokeless tobacco: Never   Vaping Use    Vaping Use: Never used   Substance and Sexual Activity    Alcohol use: Yes     Alcohol/week: 0.8 standard drinks of alcohol     Types: 1  Glasses of wine per week     Comment: 1 drink weekly    Drug use: No   Other Topics Concern    History of tanning No    Pt has a pacemaker No    Pt has a defibrillator No    Reaction to local anesthetic No     Comment: \"Puts me out\"    Caffeine Concern Yes     Comment: Coffee, 5 cups daily;     Left Handed No    Right Handed Yes    Currently spends a great deal of time in the sun No    Hx of Spending Great Deal of Time in Sun Yes    Bad sunburns in the past No    Tanning Salons in the Past No    Hx of Radiation Treatments No        REVIEW OF SYSTEMS:   GENERAL HEALTH: feels well otherwise  GENERAL : denies fever, chills, sweats, weight loss, weight gain  SKIN: denies any unusual skin lesions or rashes  RESPIRATORY: denies shortness of breath with exertion  NEURO: denies headaches    EXAM:   Ht 5' 2\" (1.575 m)   Wt 118 lb (53.5 kg)   BMI 21.58 kg/m²   System Details   Skin Inspection - Normal.   Constitutional Overall appearance - Normal.   Head/Face Facial features - Normal. Eyebrows - Normal. Skull - Normal.   Eyes Conjunctiva - Right: Normal, Left: Normal. Pupil - Right: Normal, Left: Normal.    Ears Inspection - Right: Normal, Left: Normal.   Canal - Right: Normal, Left: Normal.    TM - Right: Normal, Left: Normal.   Nasal External nose - Normal.   Nasal septum -left septal deviation  Turbinates -congested turbinates.  No purulence or polyps seen   Oral/Oropharynx Lips - Normal, Tonsils - Normal, Tongue - Normal    Neck Exam Inspection - Normal. Palpation - Normal. Parotid gland - Normal. Thyroid gland - Normal.   Lymph Detail Submental. Submandibular. Anterior cervical. Posterior cervical. Supraclavicular.  All without enlargement   Psychiatric Orientation - Oriented to time, place, person & situation. Appropriate mood and affect.   Neurological Memory - Normal. Cranial nerves - Cranial nerves II through XII grossly intact.   Larynx Mirror examination.  Can visualize posterior larynx.  Cannot see anterior  larynx.  Posterior laryngeal erythema.  No retained secretions.  Normal vocal cord mobility.     ASSESSMENT AND PLAN:   1.  Gastroesophageal reflux  Patient replaced on the famotidine.  Can also try an antireflux diet.  Follow-up in 3 months time, sooner if problems.    2. Nasal septal deviation  To the left.    3. Nasal turbinate hypertrophy  Can try Zyrtec at bedtime.  Follow-up in 3 months time, sooner if problems.      The patient indicates understanding of these issues and agrees to the plan.      Mame Feng MD  1/15/2024  8:48 AM

## 2024-02-20 ENCOUNTER — OFFICE VISIT (OUTPATIENT)
Dept: INTERNAL MEDICINE CLINIC | Facility: CLINIC | Age: 89
End: 2024-02-20
Payer: MEDICARE

## 2024-02-20 ENCOUNTER — TELEPHONE (OUTPATIENT)
Dept: ENDOCRINOLOGY CLINIC | Facility: CLINIC | Age: 89
End: 2024-02-20

## 2024-02-20 ENCOUNTER — LAB ENCOUNTER (OUTPATIENT)
Dept: LAB | Age: 89
End: 2024-02-20
Attending: INTERNAL MEDICINE
Payer: MEDICARE

## 2024-02-20 VITALS
DIASTOLIC BLOOD PRESSURE: 70 MMHG | BODY MASS INDEX: 20.4 KG/M2 | SYSTOLIC BLOOD PRESSURE: 180 MMHG | HEART RATE: 73 BPM | WEIGHT: 110.88 LBS | RESPIRATION RATE: 16 BRPM | HEIGHT: 62 IN

## 2024-02-20 DIAGNOSIS — M81.0 AGE-RELATED OSTEOPOROSIS WITHOUT CURRENT PATHOLOGICAL FRACTURE: ICD-10-CM

## 2024-02-20 DIAGNOSIS — R63.4 WEIGHT LOSS: ICD-10-CM

## 2024-02-20 DIAGNOSIS — E55.9 VITAMIN D DEFICIENCY: ICD-10-CM

## 2024-02-20 DIAGNOSIS — M81.0 AGE-RELATED OSTEOPOROSIS WITHOUT CURRENT PATHOLOGICAL FRACTURE: Primary | ICD-10-CM

## 2024-02-20 DIAGNOSIS — R03.0 ELEVATED BLOOD PRESSURE READING IN OFFICE WITHOUT DIAGNOSIS OF HYPERTENSION: ICD-10-CM

## 2024-02-20 DIAGNOSIS — R63.4 WEIGHT LOSS: Primary | ICD-10-CM

## 2024-02-20 LAB
ALBUMIN SERPL-MCNC: 4.2 G/DL (ref 3.2–4.8)
ALBUMIN/GLOB SERPL: 1.8 {RATIO} (ref 1–2)
ALP LIVER SERPL-CCNC: 86 U/L
ALT SERPL-CCNC: 22 U/L
ANION GAP SERPL CALC-SCNC: 6 MMOL/L (ref 0–18)
AST SERPL-CCNC: 27 U/L (ref ?–34)
BASOPHILS # BLD AUTO: 0.03 X10(3) UL (ref 0–0.2)
BASOPHILS NFR BLD AUTO: 0.5 %
BILIRUB SERPL-MCNC: 0.7 MG/DL (ref 0.2–0.9)
BUN BLD-MCNC: 18 MG/DL (ref 9–23)
BUN/CREAT SERPL: 25.7 (ref 10–20)
CALCIUM BLD-MCNC: 9.7 MG/DL (ref 8.7–10.4)
CHLORIDE SERPL-SCNC: 105 MMOL/L (ref 98–112)
CO2 SERPL-SCNC: 31 MMOL/L (ref 21–32)
CREAT BLD-MCNC: 0.7 MG/DL
DEPRECATED RDW RBC AUTO: 44.4 FL (ref 35.1–46.3)
EGFRCR SERPLBLD CKD-EPI 2021: 81 ML/MIN/1.73M2 (ref 60–?)
EOSINOPHIL # BLD AUTO: 0.27 X10(3) UL (ref 0–0.7)
EOSINOPHIL NFR BLD AUTO: 4.8 %
ERYTHROCYTE [DISTWIDTH] IN BLOOD BY AUTOMATED COUNT: 12.5 % (ref 11–15)
FASTING STATUS PATIENT QL REPORTED: NO
GLOBULIN PLAS-MCNC: 2.4 G/DL (ref 2.8–4.4)
GLUCOSE BLD-MCNC: 100 MG/DL (ref 70–99)
HCT VFR BLD AUTO: 38.8 %
HGB BLD-MCNC: 12.9 G/DL
IMM GRANULOCYTES # BLD AUTO: 0.02 X10(3) UL (ref 0–1)
IMM GRANULOCYTES NFR BLD: 0.4 %
LYMPHOCYTES # BLD AUTO: 1.91 X10(3) UL (ref 1–4)
LYMPHOCYTES NFR BLD AUTO: 33.9 %
MCH RBC QN AUTO: 32.1 PG (ref 26–34)
MCHC RBC AUTO-ENTMCNC: 33.2 G/DL (ref 31–37)
MCV RBC AUTO: 96.5 FL
MONOCYTES # BLD AUTO: 0.62 X10(3) UL (ref 0.1–1)
MONOCYTES NFR BLD AUTO: 11 %
NEUTROPHILS # BLD AUTO: 2.78 X10 (3) UL (ref 1.5–7.7)
NEUTROPHILS # BLD AUTO: 2.78 X10(3) UL (ref 1.5–7.7)
NEUTROPHILS NFR BLD AUTO: 49.4 %
OSMOLALITY SERPL CALC.SUM OF ELEC: 296 MOSM/KG (ref 275–295)
PLATELET # BLD AUTO: 196 10(3)UL (ref 150–450)
POTASSIUM SERPL-SCNC: 4 MMOL/L (ref 3.5–5.1)
PROT SERPL-MCNC: 6.6 G/DL (ref 5.7–8.2)
RBC # BLD AUTO: 4.02 X10(6)UL
SODIUM SERPL-SCNC: 142 MMOL/L (ref 136–145)
TSI SER-ACNC: 3.01 MIU/ML (ref 0.55–4.78)
VIT D+METAB SERPL-MCNC: 67.6 NG/ML (ref 30–100)
WBC # BLD AUTO: 5.6 X10(3) UL (ref 4–11)

## 2024-02-20 PROCEDURE — 84443 ASSAY THYROID STIM HORMONE: CPT

## 2024-02-20 PROCEDURE — 85025 COMPLETE CBC W/AUTO DIFF WBC: CPT

## 2024-02-20 PROCEDURE — 80053 COMPREHEN METABOLIC PANEL: CPT

## 2024-02-20 PROCEDURE — 82306 VITAMIN D 25 HYDROXY: CPT

## 2024-02-20 PROCEDURE — 36415 COLL VENOUS BLD VENIPUNCTURE: CPT

## 2024-02-20 PROCEDURE — 99213 OFFICE O/P EST LOW 20 MIN: CPT | Performed by: INTERNAL MEDICINE

## 2024-02-20 NOTE — TELEPHONE ENCOUNTER
Pt saw PCP today and had labs drawn.  She was informed that she is over due for Prolia injections.  Pt is requesting to schedule a NV appointment for injection.  Please call

## 2024-02-21 NOTE — TELEPHONE ENCOUNTER
Last Prolia injection was 12/12/22.    Please submit Prolia IV. Added bone specific alk phos and PTH to orders to labs done today.

## 2024-02-25 NOTE — PROGRESS NOTES
Subjective:     Patient ID: Ladan Rinaldi is a 89 year old female.  Presents for follow-up on chronic cough, osteoporosis    HPI  Patient reports that she has been feeling well overall, looks like she missed appointment for Prolia.  Continues to have postnasal drainage she needs to clear her throat frequently.  She is being followed by pulmonology.    She continues to live independently, continues to drive.      Current Outpatient Medications   Medication Sig Dispense Refill    famotidine 40 MG Oral Tab Take 1 tablet (40 mg total) by mouth daily. 30 tablet 5    levocetirizine 5 MG Oral Tab Take 1 tablet (5 mg total) by mouth every evening. 90 tablet 3    ipratropium 0.06 % Nasal Solution 2 sprays by Nasal route 3 (three) times daily. 15 mL 5    clotrimazole 1 % External Solution Apply 1 Application. topically 2 (two) times daily. 60 mL 3    Azelastine HCl 0.1 % Nasal Solution 2 sprays by Nasal route 2 (two) times daily. 30 mL 11    brimonidine Tartrate 0.2 % Ophthalmic Solution       Dorzolamide HCl-Timolol Mal 22.3-6.8 MG/ML Ophthalmic Solution INSTILL 1 DROP INTO LEFT EYE TWICE DAILY FOR 30 DAYS      aspirin 81 MG Oral Tab EC Take  by mouth. Aspir-81 81 mg tablet,delayed release      Calcium Carbonate-Vitamin D 500-200 MG-UNIT Oral Tab Take  by mouth. Calcium 500 + D 500 mg (1,250 mg)-200 unit tablet      Multiple Vitamins-Minerals (CENTRUM SILVER) Oral Tab Take  by mouth. take 1 tablet by oral route  every day      Omega-3 Fatty Acids (RA FISH OIL) 1000 MG Oral Cap Take  by mouth. FISH OIL (unknown strength)       Allergies:No Known Allergies    Past Medical History:   Diagnosis Date    Atypical nevus 2012    skin of right cheek    Cancer (HCC)     Melanoma in situ of cheek (HCC) 2012    \"Malignant melanoma in situ - Right cheek    Osteoporosis     Other and unspecified hyperlipidemia       Past Surgical History:   Procedure Laterality Date    ADENOIDECTOMY      CATARACT Bilateral     COLONOSCOPY      D & C       TONSILLECTOMY        Family History   Problem Relation Age of Onset    Heart Disorder Father     Diabetes Mother     Ovarian Cancer Sister         colon cancer    Colon Cancer Sister     Skin cancer Sister         unknown type    Colon Cancer Sister     Ovarian Cancer Sister     Breast Cancer Sister     Colon Cancer Other         Family H/o      Social History:   Social History     Socioeconomic History    Marital status:    Tobacco Use    Smoking status: Never    Smokeless tobacco: Never   Vaping Use    Vaping Use: Never used   Substance and Sexual Activity    Alcohol use: Yes     Alcohol/week: 0.8 standard drinks of alcohol     Types: 1 Glasses of wine per week     Comment: 1 drink weekly    Drug use: No   Other Topics Concern    History of tanning No    Pt has a pacemaker No    Pt has a defibrillator No    Reaction to local anesthetic No     Comment: \"Puts me out\"    Caffeine Concern Yes     Comment: Coffee, 5 cups daily;     Left Handed No    Right Handed Yes    Currently spends a great deal of time in the sun No    Hx of Spending Great Deal of Time in Sun Yes    Bad sunburns in the past No    Tanning Salons in the Past No    Hx of Radiation Treatments No        BP (!) 180/70   Pulse 73   Resp 16   Ht 5' 2\" (1.575 m)   Wt 110 lb 14.4 oz (50.3 kg)   BMI 20.28 kg/m²    Physical Exam  Constitutional:       Appearance: Normal appearance.   HENT:      Head: Normocephalic and atraumatic.   Eyes:      General: No scleral icterus.     Extraocular Movements: Extraocular movements intact.      Conjunctiva/sclera: Conjunctivae normal.      Pupils: Pupils are equal, round, and reactive to light.   Cardiovascular:      Rate and Rhythm: Normal rate and regular rhythm.      Heart sounds: No murmur heard.     No gallop.   Pulmonary:      Effort: Pulmonary effort is normal.      Breath sounds: No wheezing, rhonchi or rales.   Abdominal:      General: Abdomen is flat.      Palpations: Abdomen is soft. There is no  mass.      Tenderness: There is no abdominal tenderness. There is no guarding or rebound.   Musculoskeletal:         General: Normal range of motion.      Cervical back: Normal range of motion and neck supple.      Right lower leg: No edema.      Left lower leg: No edema.   Lymphadenopathy:      Cervical: No cervical adenopathy.   Skin:     General: Skin is warm.      Coloration: Skin is not jaundiced.   Neurological:      General: No focal deficit present.      Mental Status: She is alert and oriented to person, place, and time. Mental status is at baseline.   Psychiatric:         Mood and Affect: Mood normal.         Behavior: Behavior normal.         Thought Content: Thought content normal.         Assessment & Plan:   1. Weight loss reviewed with patient importance of consuming adequate calorie per day, weight herself once a week return if the weight loss continues   2. Age-related osteoporosis without current pathological fracture see endocrinology   3. Vitamin D deficiency    4.       Elevated blood pressure without diagnosis of hypertension, patient to start to monitor blood pressure at home, see nurse in 2 weeks for blood pressure recheck    Orders Placed This Encounter   Procedures    CBC With Differential With Platelet    Comp Metabolic Panel (14)    TSH W Reflex To Free T4    Vitamin D       Meds This Visit:  Requested Prescriptions      No prescriptions requested or ordered in this encounter       Imaging & Referrals:  None

## 2024-02-26 NOTE — TELEPHONE ENCOUNTER
Spoke to patient - NV scheduled 2/27/24 - patient stated understanding to complete labs after injection

## 2024-02-26 NOTE — TELEPHONE ENCOUNTER
Ok to schedule NV for prolia then next injection MD visit.  Yes ok to go for labs after MD visit. Thanks.

## 2024-02-26 NOTE — TELEPHONE ENCOUNTER
Dr. Ascencio, patient is due for prolia. Last given 12/12/22  She is due for follow up with you and labs.   Would you like her to be seen for nurse visit this round?  OK for her to go to the lab to complete Alk phos and PTH same day so she doesn't need to make an extra trip?   I called the lab and confirmed they were unable to add on the labs ordered 2/21 due to the labs being send out.       IV request submitted via [employer] portal.   Prolia covered by plan for in office injection.   No PA required.   Estimated oop cost 0%  SOB sent to scanning and copied below:

## 2024-02-27 ENCOUNTER — LAB ENCOUNTER (OUTPATIENT)
Dept: LAB | Facility: HOSPITAL | Age: 89
End: 2024-02-27
Attending: INTERNAL MEDICINE
Payer: MEDICARE

## 2024-02-27 ENCOUNTER — NURSE ONLY (OUTPATIENT)
Dept: ENDOCRINOLOGY CLINIC | Facility: CLINIC | Age: 89
End: 2024-02-27

## 2024-02-27 DIAGNOSIS — M81.0 AGE-RELATED OSTEOPOROSIS WITHOUT CURRENT PATHOLOGICAL FRACTURE: ICD-10-CM

## 2024-02-27 DIAGNOSIS — M81.0 AGE-RELATED OSTEOPOROSIS WITHOUT CURRENT PATHOLOGICAL FRACTURE: Primary | ICD-10-CM

## 2024-02-27 LAB — PTH-INTACT SERPL-MCNC: 20.3 PG/ML (ref 18.5–88)

## 2024-02-27 PROCEDURE — 36415 COLL VENOUS BLD VENIPUNCTURE: CPT

## 2024-02-27 PROCEDURE — 83970 ASSAY OF PARATHORMONE: CPT

## 2024-02-27 PROCEDURE — 96372 THER/PROPH/DIAG INJ SC/IM: CPT | Performed by: INTERNAL MEDICINE

## 2024-02-27 PROCEDURE — 84080 ASSAY ALKALINE PHOSPHATASES: CPT

## 2024-02-27 NOTE — PROGRESS NOTES
Pt tolerated well 60 mg of prolia in right upper arm with no adverse effects.     Pt was reminded to get blood work done, as well as schedule next injection and follow up with Dr. Ascencio on 8/28/2024 or after.       Pt understood and had no additional questions or concerns at this time.

## 2024-03-01 LAB — ALKALINE PHOSPHATASE BONE SPECIFIC: 14.5 UG/L

## 2024-03-21 PROBLEM — J06.9 VIRAL UPPER RESPIRATORY TRACT INFECTION: Status: RESOLVED | Noted: 2017-01-25 | Resolved: 2024-03-21

## 2024-03-22 ENCOUNTER — TELEPHONE (OUTPATIENT)
Dept: INTERNAL MEDICINE CLINIC | Facility: CLINIC | Age: 89
End: 2024-03-22

## 2024-03-22 NOTE — TELEPHONE ENCOUNTER
Patient called in with blood pressure readings to report:     3/7: 159/ does not remember diastolic   3/22: 129/60    No concerns. She has upcoming visit    Future Appointments   Date Time Provider Department Center   3/25/2024 10:20 AM Angela Dixon MD [id number] [facility] [place]   5/1/2024  8:00 AM [facility] RM1 [facility] SCAN EM [facility]   8/29/2024  9:30 AM [facility] VARELA RN [id number]

## 2024-03-25 ENCOUNTER — OFFICE VISIT (OUTPATIENT)
Dept: INTERNAL MEDICINE CLINIC | Facility: CLINIC | Age: 89
End: 2024-03-25

## 2024-03-25 VITALS
SYSTOLIC BLOOD PRESSURE: 153 MMHG | BODY MASS INDEX: 21.35 KG/M2 | HEIGHT: 62 IN | DIASTOLIC BLOOD PRESSURE: 84 MMHG | HEART RATE: 67 BPM | RESPIRATION RATE: 16 BRPM | WEIGHT: 116 LBS

## 2024-03-25 DIAGNOSIS — J47.9 BRONCHIECTASIS WITHOUT COMPLICATION (HCC): ICD-10-CM

## 2024-03-25 DIAGNOSIS — J84.10 LUNG GRANULOMA (HCC): ICD-10-CM

## 2024-03-25 DIAGNOSIS — M81.0 AGE-RELATED OSTEOPOROSIS WITHOUT CURRENT PATHOLOGICAL FRACTURE: ICD-10-CM

## 2024-03-25 DIAGNOSIS — Z00.00 MEDICARE ANNUAL WELLNESS VISIT, SUBSEQUENT: Primary | ICD-10-CM

## 2024-03-25 DIAGNOSIS — K21.9 GASTRIC REFLUX SYNDROME: ICD-10-CM

## 2024-03-25 PROCEDURE — G0439 PPPS, SUBSEQ VISIT: HCPCS | Performed by: INTERNAL MEDICINE

## 2024-03-25 RX ORDER — PANTOPRAZOLE SODIUM 40 MG/1
40 TABLET, DELAYED RELEASE ORAL
Qty: 90 TABLET | Refills: 1 | Status: SHIPPED | OUTPATIENT
Start: 2024-03-25 | End: 2024-03-25 | Stop reason: CLARIF

## 2024-03-25 NOTE — PROGRESS NOTES
Subjective:   Ladan Rinaldi is a 89 year old female who presents for a Medicare Subsequent Annual Wellness visit (Pt already had Initial Annual Wellness) and scheduled follow up of multiple significant but stable problems.     Patient has been feeling well overall, lives independently, monitors blood pressure at home at times it is in the 129/80 range.  Today in the office it is better than previous time, she has gained some weight trying to eat better.  Bothered by abdominal bloating and excessive amount of gas, never tried probiotics, she has been taking famotidine and Xyzal prescribed by ENT specialist, and this medications helped constant need to clear her from  Patient being monitored by for pulmonary nodules revealed on CT scan of the lungs, sees pulmonologist periodically.  Osteoporosis being treated with Prolia injections by endocrinology  History/Other:   Fall Risk Assessment:   She has been screened for Falls and is low risk.      Cognitive Assessment:   She had a completely normal cognitive assessment - see flowsheet entries     Functional Ability/Status:   Ladan Rinaldi has a completely normal functional assessment. See flowsheet for details.      Depression Screening (PHQ-2/PHQ-9): PHQ-2 SCORE: 0  , done 3/25/2024            Advanced Directives:   She has a Living Will on file in [facility]; reviewed and discussed documents with patient (and family/surrogate if present).  She has a Power of  for Health Care on file in [facility].        Patient Active Problem List   Diagnosis    Neoplasm of uncertain behavior of skin    History of melanoma in situ    Gastric reflux syndrome    Osteoporosis    Chronic laryngitis    Vitamin D deficiency    Milia    Actinic keratosis    Inflamed seborrheic keratosis    Pulmonary nodule    Herpes zoster    Sun-damaged skin    Primary hypertension     Allergies:  She has No Known Allergies.    Current Medications:  Outpatient Medications Marked as Taking for the 3/25/24  encounter (Office Visit) with Angela Dixon MD   Medication Sig    famotidine 40 MG Oral Tab Take 1 tablet (40 mg total) by mouth daily.    levocetirizine 5 MG Oral Tab Take 1 tablet (5 mg total) by mouth every evening.    clotrimazole 1 % External Solution Apply 1 Application. topically 2 (two) times daily.    brimonidine Tartrate 0.2 % Ophthalmic Solution     Dorzolamide HCl-Timolol Mal 22.3-6.8 MG/ML Ophthalmic Solution INSTILL 1 DROP INTO LEFT EYE TWICE DAILY FOR 30 DAYS    aspirin 81 MG Oral Tab EC Take  by mouth. Aspir-81 81 mg tablet,delayed release    Calcium Carbonate-Vitamin D 500-200 MG-UNIT Oral Tab Take  by mouth. Calcium 500 + D 500 mg (1,250 mg)-200 unit tablet    Multiple Vitamins-Minerals (CENTRUM SILVER) Oral Tab Take  by mouth. take 1 tablet by oral route  every day    Omega-3 Fatty Acids (RA FISH OIL) 1000 MG Oral Cap Take  by mouth. FISH OIL (unknown strength)     Current Facility-Administered Medications for the 3/25/24 encounter (Office Visit) with Angela Dixon MD   Medication    denosumab (Prolia) 60 MG/ML SUBQ injection 60 mg       Medical History:  She  has a past medical history of Atypical nevus (2012), Cancer (HCC), Melanoma in situ of cheek (HCC) (2012), Osteoporosis, and Other and unspecified hyperlipidemia.  Surgical History:  She  has a past surgical history that includes colonoscopy; d & c; tonsillectomy; adenoidectomy; and cataract (Bilateral).   Family History:  Her family history includes Breast Cancer in her sister; Colon Cancer in her sister, sister and another family member; Diabetes in her mother; Heart Disorder in her father; Ovarian Cancer in her sister and sister; Skin cancer in her sister.  Social History:  She  reports that she has never smoked. She has never used smokeless tobacco. She reports current alcohol use of about 0.8 standard drinks of alcohol per week. She reports that she does not use drugs.    Tobacco:  She has never smoked tobacco.    CAGE Alcohol  Screen:   CAGE screening score of 0 on 3/25/2024, showing low risk of alcohol abuse.      Patient Care Team:  Angela Dixon MD as PCP - General (Internal Medicine)      Physical Exam  Constitutional:       Appearance: Normal appearance.   HENT:      Head: Normocephalic and atraumatic.      Right Ear: Tympanic membrane and ear canal normal.      Left Ear: Tympanic membrane and ear canal normal.   Cardiovascular:      Rate and Rhythm: Normal rate and regular rhythm.      Heart sounds: No murmur heard.     No gallop.   Pulmonary:      Effort: Pulmonary effort is normal. No respiratory distress.      Breath sounds: Normal breath sounds. No stridor. No wheezing.   Abdominal:      General: Bowel sounds are normal.      Palpations: Abdomen is soft. There is no mass.      Tenderness: There is no abdominal tenderness. There is no guarding or rebound.   Musculoskeletal:         General: Normal range of motion.      Cervical back: Normal range of motion and neck supple.      Right lower leg: No edema.      Left lower leg: No edema.   Lymphadenopathy:      Cervical: No cervical adenopathy.   Skin:     General: Skin is warm.      Coloration: Skin is not jaundiced.      Findings: No bruising or erythema.   Neurological:      General: No focal deficit present.      Mental Status: She is alert and oriented to person, place, and time. Mental status is at baseline.   Psychiatric:         Mood and Affect: Mood normal.         Behavior: Behavior normal.         Thought Content: Thought content normal.           /84 (BP Location: Right arm, Patient Position: Sitting, Cuff Size: adult)   Pulse 67   Resp 16   Ht 5' 2\" (1.575 m)   Wt 116 lb (52.6 kg)   BMI 21.22 kg/m²  Estimated body mass index is 21.22 kg/m² as calculated from the following:    Height as of this encounter: 5' 2\" (1.575 m).    Weight as of this encounter: 116 lb (52.6 kg).    Medicare Hearing Assessment:   Hearing Screening    Time taken: 3/25/2024 10:10  AM  Entry User: Mara Sol MA  Screening Method: Finger Rub  Finger Rub Result: Pass         Visual Acuity:   Right Eye Visual Acuity: Uncorrected     Left Eye Visual Acuity: Uncorrected     Both Eyes Visual Acuity: Uncorrected Both Eyes Chart Acuity: 20/25   Able To Tolerate Visual Acuity: Yes        Assessment & Plan:   Ladan Rinaldi is a 89 year old female who presents for a Medicare Assessment.       ICD-10-CM    1. Medicare annual wellness visit, subsequent  Z00.00       2. 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 Gastric reflux syndrome continue GERD diet, omeprazole K21.9       4. Age-related osteoporosis without current pathological fracture stable continue treatment with Prolia per endocrinology M81.0       5. Bronchiectasis without complication (HCC)  J47.9 Asymptomatic no treatment needed       6.     Pulmonary nodules stable, being monitored by pulmonology    Follow-up in 1 year or sooner if needed    Angela Dixon MD, 3/25/2024     Supplementary Documentation:   General Health:  In the past six months, have you lost more than 10 pounds without trying?: 2 - No  Has your appetite been poor?: No  Type of Diet: Balanced  How does the patient maintain a good energy level?: Daily Walks  How would you describe your daily physical activity?: Moderate  How would you describe your current health state?: Fair  How do you maintain positive mental well-being?: Social Interaction;Puzzles  On a scale of 0 to 10, with 0 being no pain and 10 being severe pain, what is your pain level?: 0 - (None)  In the past six months, have you experienced urine leakage?: 1-Yes  At any time do you feel concerned for the safety/well-being of yourself and/or your children, in your home or elsewhere?: No  Have you had any immunizations at another office such as Influenza, Hepatitis B, Tetanus, or Pneumococcal?: No       Ladan Rinaldi's SCREENING SCHEDULE   Tests on this list are recommended by your  physician but may not be covered, or covered at this frequency, by your insurer.   Please check with your insurance carrier before scheduling to verify coverage.   PREVENTATIVE SERVICES FREQUENCY &  COVERAGE DETAILS LAST COMPLETION DATE   Diabetes Screening    Fasting Blood Sugar /  Glucose    One screening every 12 months if never tested or if previously tested but not diagnosed with pre-diabetes   One screening every 6 months if diagnosed with pre-diabetes Lab Results   Component Value Date     (H) 02/20/2024        Cardiovascular Disease Screening    Lipid Panel  Cholesterol  Lipoprotein (HDL)  Triglycerides Covered every 5 years for all Medicare beneficiaries without apparent signs or symptoms of cardiovascular disease Lab Results   Component Value Date    CHOLEST 204 (H) 09/10/2016    HDL 64 09/10/2016     (H) 09/10/2016    TRIG 79 09/10/2016         Electrocardiogram (EKG)   Covered if needed at Welcome to Medicare, and non-screening if indicated for medical reasons 04/25/2020      Ultrasound Screening for Abdominal Aortic Aneurysm (AAA) Covered once in a lifetime for one of the following risk factors    Men who are 65-75 years old and have ever smoked    Anyone with a family history -     Colorectal Cancer Screening  Covered for ages 50-85; only need ONE of the following:    Colonoscopy   Covered every 10 years    Covered every 2 years if patient is at high risk or previous colonoscopy was abnormal -    No recommendations at this time    Flexible Sigmoidoscopy   Covered every 4 years -    Fecal Occult Blood Test Covered annually -   Bone Density Screening    Bone density screening    Covered every 2 years after age 65 if diagnosed with risk of osteoporosis or estrogen deficiency.    Covered yearly for long-term glucocorticoid medication use (Steroids) Last Dexa Scan:    XR DEXA BONE DENSITOMETRY (CPT=77080) 11/16/2022      No recommendations at this time   Pap and Pelvic    Pap   Covered every 2  years for women at normal risk; Annually if at high risk -  No recommendations at this time    Chlamydia Annually if high risk -  No recommendations at this time   Screening Mammogram    Mammogram     Recommend annually for all female patients aged 40 and older    One baseline mammogram covered for patients aged 35-39 -    No recommendations at this time    Immunizations    Influenza Covered once per flu season  Please get every year 12/20/2023  No recommendations at this time    Pneumococcal Each vaccine ([id number] & [id number]) covered once after 65 Prevnar 13: 05/29/2018    [id number]: 09/29/2020     No recommendations at this time    Hepatitis B One screening covered for patients with certain risk factors   -  No recommendations at this time    Tetanus Toxoid Not covered by Medicare Part B unless medically necessary (cut with metal); may be covered with your pharmacy prescription benefits -    Tetanus, Diptheria and Pertusis TD and TDaP Not covered by Medicare Part B -  No recommendations at this time    Zoster Not covered by Medicare Part B; may be covered with your pharmacy  prescription benefits -  No recommendations at this time           No

## 2024-04-23 NOTE — LETTER
05/02/18        Bg Carlos      Dear Desirae Mccall records indicate that you have outstanding lab work and or testing that was ordered for you and has not yet been completed:          Comp Metabolic Panel (14 1910: Assumed care of the care   Pt alert and sitting up in bed. IV fluids running. Pt desires  labor epidural. Pitocin at 24mu.  EFM: category one and pt mohinder very 2-3.5 minutes apart.  MDA called at 1915 to come for labor epidural placement. Dr. Hu MDA at bedside at 1924.   Not able to trace FHR from 1924 to 1942 due to pt sitting up the labor epidural  2000: pt comfortable with labor epidural. Vss stable.

## 2024-05-01 ENCOUNTER — HOSPITAL ENCOUNTER (OUTPATIENT)
Dept: CT IMAGING | Facility: HOSPITAL | Age: 89
Discharge: HOME | End: 2024-05-01
Attending: INTERNAL MEDICINE
Payer: MEDICARE

## 2024-05-01 DIAGNOSIS — R91.8 PULMONARY NODULES: ICD-10-CM

## 2024-05-01 DIAGNOSIS — R91.1 PULMONARY NODULE: ICD-10-CM

## 2024-05-01 PROCEDURE — 71250 CT THORAX DX C-: CPT | Performed by: INTERNAL MEDICINE

## 2024-05-17 ENCOUNTER — NURSE TRIAGE (OUTPATIENT)
Dept: INTERNAL MEDICINE CLINIC | Facility: CLINIC | Age: 89
End: 2024-05-17

## 2024-05-17 NOTE — TELEPHONE ENCOUNTER
Action Requested: Summary for Provider     []  Critical Lab, Recommendations Needed  [] Need Additional Advice  [x]   FYI    []   Need Orders  [] Need Medications Sent to Pharmacy  []  Other     SUMMARY: Patient has an appointment with  5/2024.    Patient states this morning she had a bowel movement , had some burning and when she wiped there was a streak of blood.    Patient states she has since had another bowel movement and urinated, no blood no pain. Patient denies dizziness, lightheadedness or bleeding any where , states she feels \"great\".    Please see Care Advice for advice given to patient.     Reason for call: Rectal Bleeding  Onset: today  Reason for Disposition   MILD rectal bleeding (more than just a few drops or streaks)    Protocols used: Rectal Bleeding-A-[facility]

## 2024-05-20 ENCOUNTER — OFFICE VISIT (OUTPATIENT)
Dept: INTERNAL MEDICINE CLINIC | Facility: CLINIC | Age: 89
End: 2024-05-20

## 2024-05-20 ENCOUNTER — LAB ENCOUNTER (OUTPATIENT)
Dept: LAB | Age: 89
End: 2024-05-20
Attending: INTERNAL MEDICINE

## 2024-05-20 VITALS
DIASTOLIC BLOOD PRESSURE: 84 MMHG | HEART RATE: 72 BPM | BODY MASS INDEX: 20.8 KG/M2 | SYSTOLIC BLOOD PRESSURE: 150 MMHG | WEIGHT: 113 LBS | HEIGHT: 62 IN

## 2024-05-20 DIAGNOSIS — K64.9 BLEEDING HEMORRHOID: ICD-10-CM

## 2024-05-20 DIAGNOSIS — R30.9 PAINFUL URINATION: Primary | ICD-10-CM

## 2024-05-20 DIAGNOSIS — R30.9 PAINFUL URINATION: ICD-10-CM

## 2024-05-20 LAB
BILIRUB UR QL: NEGATIVE
GLUCOSE UR-MCNC: NORMAL MG/DL
HGB UR QL STRIP.AUTO: NEGATIVE
KETONES UR-MCNC: NEGATIVE MG/DL
LEUKOCYTE ESTERASE UR QL STRIP.AUTO: 500
PH UR: 6.5 [PH] (ref 5–8)
PROT UR-MCNC: NEGATIVE MG/DL
SP GR UR STRIP: 1.01 (ref 1–1.03)
UROBILINOGEN UR STRIP-ACNC: NORMAL
WBC #/AREA URNS AUTO: >50 /HPF
WBC CLUMPS UR QL AUTO: PRESENT /HPF

## 2024-05-20 PROCEDURE — 81001 URINALYSIS AUTO W/SCOPE: CPT

## 2024-05-20 PROCEDURE — G2211 COMPLEX E/M VISIT ADD ON: HCPCS | Performed by: INTERNAL MEDICINE

## 2024-05-20 PROCEDURE — 87186 SC STD MICRODIL/AGAR DIL: CPT

## 2024-05-20 PROCEDURE — 99214 OFFICE O/P EST MOD 30 MIN: CPT | Performed by: INTERNAL MEDICINE

## 2024-05-20 PROCEDURE — 87086 URINE CULTURE/COLONY COUNT: CPT

## 2024-05-20 PROCEDURE — 87088 URINE BACTERIA CULTURE: CPT

## 2024-05-20 RX ORDER — PANTOPRAZOLE SODIUM 40 MG/1
40 TABLET, DELAYED RELEASE ORAL
COMMUNITY
Start: 2024-03-25

## 2024-05-20 NOTE — PROGRESS NOTES
Subjective:     Patient ID: Ladan Rinaldi is a 89 year old female.  Presents for evaluation of the rectal bleeding, burning urine  HPI  Patient reports that last week when she had to force bowel movement she white blood and so couple drops in the toilet bowl, after that bowel movement she had 2 other bowel movements later in the day which where blood free, she had no abdominal pain, she never had this problem before  She took probiotic for 1 month and felt relieved in the gassy feeling in the abdomen.  Continues to take acid reducer  .  Last night she felt burning in the vaginal area while urinating, she does not drink enough liquids, concerned that she may be developing infection.    Current Outpatient Medications   Medication Sig Dispense Refill    pantoprazole 40 MG Oral Tab EC Take 1 tablet (40 mg total) by mouth before breakfast.      famotidine 40 MG Oral Tab Take 1 tablet (40 mg total) by mouth daily. 30 tablet 5    levocetirizine 5 MG Oral Tab Take 1 tablet (5 mg total) by mouth every evening. 90 tablet 3    clotrimazole 1 % External Solution Apply 1 Application. topically 2 (two) times daily. 60 mL 3    brimonidine Tartrate 0.2 % Ophthalmic Solution       Dorzolamide HCl-Timolol Mal 22.3-6.8 MG/ML Ophthalmic Solution INSTILL 1 DROP INTO LEFT EYE TWICE DAILY FOR 30 DAYS      aspirin 81 MG Oral Tab EC Take  by mouth. Aspir-81 81 mg tablet,delayed release      Calcium Carbonate-Vitamin D 500-200 MG-UNIT Oral Tab Take  by mouth. Calcium 500 + D 500 mg (1,250 mg)-200 unit tablet      Multiple Vitamins-Minerals (CENTRUM SILVER) Oral Tab Take  by mouth. take 1 tablet by oral route  every day      Omega-3 Fatty Acids (RA FISH OIL) 1000 MG Oral Cap Take  by mouth. 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    Osteoporosis    Other and  unspecified hyperlipidemia      Past Surgical History:   Procedure Laterality Date    Adenoidectomy      Cataract Bilateral     Colonoscopy      D & c      Tonsillectomy        Family History   Problem Relation Age of Onset    Heart Disorder Father     Diabetes Mother     Ovarian Cancer Sister         colon cancer    Colon Cancer Sister     Skin cancer Sister         unknown type    Colon Cancer Sister     Ovarian Cancer Sister     Breast Cancer Sister     Colon Cancer Other         Family H/o      Social History:   Social History     Socioeconomic History    Marital status:    Tobacco Use    Smoking status: Never    Smokeless tobacco: Never   Vaping Use    Vaping status: Never Used   Substance and Sexual Activity    Alcohol use: Yes     Alcohol/week: 0.8 standard drinks of alcohol     Types: 1 Glasses of wine per week     Comment: 1 drink weekly    Drug use: No   Other Topics Concern    History of tanning No    Pt has a pacemaker No    Pt has a defibrillator No    Reaction to local anesthetic No     Comment: \"Puts me out\"    Caffeine Concern Yes     Comment: Coffee, 5 cups daily;     Left Handed No    Right Handed Yes    Currently spends a great deal of time in the sun No    Hx of Spending Great Deal of Time in Sun Yes    Bad sunburns in the past No    Tanning Salons in the Past No    Hx of Radiation Treatments No        /84   Pulse 72   Ht 5' 2\" (1.575 m)   Wt 113 lb (51.3 kg)   BMI 20.67 kg/m²    Physical Exam  Constitutional:       Appearance: Normal appearance.   HENT:      Head: Normocephalic and atraumatic.   Eyes:      General: No scleral icterus.     Extraocular Movements: Extraocular movements intact.      Conjunctiva/sclera: Conjunctivae normal.      Pupils: Pupils are equal, round, and reactive to light.   Cardiovascular:      Rate and Rhythm: Normal rate and regular rhythm.      Heart sounds: No murmur heard.  Pulmonary:      Effort: Pulmonary effort is normal.   Abdominal:       Palpations: Abdomen is soft. There is no mass.      Tenderness: There is no abdominal tenderness. There is no guarding or rebound.   Genitourinary:     Rectum: External hemorrhoid and internal hemorrhoid present.      Comments: Rectal exam revealed greenish stool, no masses palpated, internal hemorrhoids present  Musculoskeletal:         General: Normal range of motion.      Cervical back: Normal range of motion and neck supple.      Right lower leg: No edema.      Left lower leg: No edema.   Lymphadenopathy:      Cervical: No cervical adenopathy.   Skin:     General: Skin is warm.      Coloration: Skin is not jaundiced.   Neurological:      General: No focal deficit present.      Mental Status: She is alert and oriented to person, place, and time. Mental status is at baseline.   Psychiatric:         Mood and Affect: Mood normal.         Behavior: Behavior normal.         Thought Content: Thought content normal.         Assessment & Plan:   1. Painful urination will check urine and culture, treat if necessary, push fluids for now, if fever, flank pain abdominal pain go to ER   2. Bleeding hemorrhoid resolved, will observe for now if bleeding recurs may need colonoscopy       Orders Placed This Encounter   Procedures    Urinalysis with Culture Reflex [E]       Meds This Visit:    Follow-up as needed    Imaging & Referrals:  None

## 2024-05-29 ENCOUNTER — OFFICE VISIT (OUTPATIENT)
Dept: OTOLARYNGOLOGY | Facility: CLINIC | Age: 89
End: 2024-05-29

## 2024-05-29 VITALS — DIASTOLIC BLOOD PRESSURE: 72 MMHG | HEART RATE: 65 BPM | SYSTOLIC BLOOD PRESSURE: 150 MMHG

## 2024-05-29 DIAGNOSIS — J30.9 ALLERGIC RHINITIS WITH POSTNASAL DRIP: ICD-10-CM

## 2024-05-29 DIAGNOSIS — K21.9 GASTROESOPHAGEAL REFLUX DISEASE, UNSPECIFIED WHETHER ESOPHAGITIS PRESENT: Primary | ICD-10-CM

## 2024-05-29 DIAGNOSIS — R09.82 ALLERGIC RHINITIS WITH POSTNASAL DRIP: ICD-10-CM

## 2024-05-29 PROCEDURE — 31575 DIAGNOSTIC LARYNGOSCOPY: CPT | Performed by: SPECIALIST

## 2024-05-29 PROCEDURE — 99213 OFFICE O/P EST LOW 20 MIN: CPT | Performed by: SPECIALIST

## 2024-05-29 RX ORDER — LEVOCETIRIZINE DIHYDROCHLORIDE 5 MG/1
5 TABLET, FILM COATED ORAL EVERY EVENING
Qty: 90 TABLET | Refills: 3 | Status: SHIPPED | OUTPATIENT
Start: 2024-05-29

## 2024-05-29 RX ORDER — FAMOTIDINE 40 MG/1
40 TABLET, FILM COATED ORAL DAILY
Qty: 30 TABLET | Refills: 5 | Status: SHIPPED | OUTPATIENT
Start: 2024-05-29

## 2024-05-29 NOTE — PATIENT INSTRUCTIONS
Restart Xyzal for your allergic rhinitis with postnasal drip.  Restart famotidine for your reflux disease.  You could also follow an antireflux diet.  No greasy foods, spicy foods, chocolate, caffeine, alcohol, spearmint, peppermint, lemon, lime, orange, grapefruit, tomatoes.  Don't eat 2 hours before bedtime  Elevate the head of bed   Follow-up in 3 months time, sooner if problems.

## 2024-05-29 NOTE — PROGRESS NOTES
Ladan Rinaldi is a 89 year old female.   Chief Complaint   Patient presents with    Follow - Up     Gastroesophageal reflux reevaluation   Reports no improvements        HPI:   Patient here for gastroesophageal reflux disease.  Although feels that the famotidine has been helping.  She does have some persistent voice issues.    Current Outpatient Medications   Medication Sig Dispense Refill    levocetirizine 5 MG Oral Tab Take 1 tablet (5 mg total) by mouth every evening. 90 tablet 3    famotidine 40 MG Oral Tab Take 1 tablet (40 mg total) by mouth daily. 30 tablet 5    cephalexin 500 MG Oral Cap Take 1 capsule (500 mg total) by mouth 2 (two) times daily. 14 capsule 0    pantoprazole 40 MG Oral Tab EC Take 1 tablet (40 mg total) by mouth before breakfast.      clotrimazole 1 % External Solution Apply 1 Application. topically 2 (two) times daily. 60 mL 3    brimonidine Tartrate 0.2 % Ophthalmic Solution       Dorzolamide HCl-Timolol Mal 22.3-6.8 MG/ML Ophthalmic Solution INSTILL 1 DROP INTO LEFT EYE TWICE DAILY FOR 30 DAYS      aspirin 81 MG Oral Tab EC Take  by mouth. Aspir-81 81 mg tablet,delayed release      Calcium Carbonate-Vitamin D 500-200 MG-UNIT Oral Tab Take  by mouth. Calcium 500 + D 500 mg (1,250 mg)-200 unit tablet      Multiple Vitamins-Minerals (CENTRUM SILVER) Oral Tab Take  by mouth. take 1 tablet by oral route  every day      Omega-3 Fatty Acids (RA FISH OIL) 1000 MG Oral Cap Take  by mouth. 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    Osteoporosis    Other and unspecified hyperlipidemia      Social History:  Social History     Socioeconomic History    Marital status:    Tobacco Use    Smoking status: Never    Smokeless tobacco: Never   Vaping Use    Vaping status: Never Used   Substance and Sexual Activity    Alcohol use: Yes      Alcohol/week: 0.8 standard drinks of alcohol     Types: 1 Glasses of wine per week     Comment: 1 drink weekly    Drug use: No   Other Topics Concern    History of tanning No    Pt has a pacemaker No    Pt has a defibrillator No    Reaction to local anesthetic No     Comment: \"Puts me out\"    Caffeine Concern Yes     Comment: Coffee, 5 cups daily;     Left Handed No    Right Handed Yes    Currently spends a great deal of time in the sun No    Hx of Spending Great Deal of Time in Sun Yes    Bad sunburns in the past No    Tanning Salons in the Past No    Hx of Radiation Treatments No        REVIEW OF SYSTEMS:   GENERAL HEALTH: feels well otherwise  GENERAL : denies fever, chills, sweats, weight loss, weight gain  SKIN: denies any unusual skin lesions or rashes  RESPIRATORY: denies shortness of breath with exertion  NEURO: denies headaches    EXAM:   /72 (BP Location: Left arm, Patient Position: Sitting, Cuff Size: adult)   Pulse 65   System Details   Skin Inspection - Normal.   Constitutional Overall appearance - Normal.   Head/Face Facial features - Normal. Eyebrows - Normal. Skull - Normal.   Eyes Conjunctiva - Right: Normal, Left: Normal. Pupil - Right: Normal, Left: Normal.    Ears Inspection - Right: Normal, Left: Normal.   Canal - Right: Normal, Left: Normal.    TM - Right: Normal, Left: Normal.   Nasal External nose - Normal.   Nasal septum - Normal.  Turbinates -large amount of mucoid postnasal drainage   Oral/Oropharynx Lips - Normal, Tonsils - Normal, Tongue - Normal    Neck Exam Inspection - Normal. Palpation - Normal. Parotid gland - Normal. Thyroid gland - Normal.   Lymph Detail Submental. Submandibular. Anterior cervical. Posterior cervical. Supraclavicular.  All without enlargement   Psychiatric Orientation - Oriented to time, place, person & situation. Appropriate mood and affect.   Neurological Memory - Normal. Cranial nerves - Cranial nerves II through XII grossly intact.   Nasopharynx Normal  by fiberoptic exam   Larynx Consent was obtained for the procedure.  The nose was asesthetized with 1% neosynephrine and 4% lidocaine topical drops.    The scope was placed into the bilateral nares as well as the nasopharynx, hypopharynx and larynx.    All of which were examined.  The  entire larynx including the vallecula, epiglottis, true and false vocal cords, aryepiglottic folds, piriform sinuses and post cricord area were examined for tumors and infectious processes as well as for evidence of reflux and retained secretions.  Vocal cord mobility was also assessed.    Any abnormalities are noted in the exam section.  Posterior laryngeal erythema.  No retained secretions.  No tumors or masses seen.  Normal vocal cord mobility.     ASSESSMENT AND PLAN:   1. Gastroesophageal reflux disease, unspecified whether esophagitis present  Restart famotidine and antireflux diet.  Follow-up in 3 months time, sooner if problems.    2. Allergic rhinitis with postnasal drip  Restart levocetirizine.      The patient indicates understanding of these issues and agrees to the plan.      Mame Feng MD  5/29/2024  6:20 PM

## 2024-07-30 ENCOUNTER — TELEPHONE (OUTPATIENT)
Facility: LOCATION | Age: 89
End: 2024-07-30

## 2024-08-01 NOTE — TELEPHONE ENCOUNTER
Received pt's Prolia SOB via [facility] 8/1/24    PA Required: NO    Prolia OOP COST: 0%    Facility Fee: NA    Admin Fee: 0%    Placed Summary of Benefits for Prolia injection in scanning bin

## 2024-08-29 ENCOUNTER — NURSE ONLY (OUTPATIENT)
Dept: ENDOCRINOLOGY CLINIC | Facility: CLINIC | Age: 89
End: 2024-08-29

## 2024-08-29 DIAGNOSIS — E55.9 VITAMIN D DEFICIENCY: ICD-10-CM

## 2024-08-29 DIAGNOSIS — M81.0 AGE-RELATED OSTEOPOROSIS WITHOUT CURRENT PATHOLOGICAL FRACTURE: Primary | ICD-10-CM

## 2024-08-29 PROCEDURE — 96372 THER/PROPH/DIAG INJ SC/IM: CPT | Performed by: INTERNAL MEDICINE

## 2024-08-29 NOTE — PROGRESS NOTES
Patient presents to clinic today for Prolia (denosumab). 60 mg/mL was administered in left upper arm. Patient tolerated well. Patient understands to return in 6 months for next injection.

## 2024-09-12 ENCOUNTER — OFFICE VISIT (OUTPATIENT)
Dept: OTOLARYNGOLOGY | Facility: CLINIC | Age: 89
End: 2024-09-12

## 2024-09-12 VITALS — BODY MASS INDEX: 20.24 KG/M2 | WEIGHT: 110 LBS | HEIGHT: 62 IN

## 2024-09-12 DIAGNOSIS — R14.0 BLOATING: ICD-10-CM

## 2024-09-12 DIAGNOSIS — K21.9 GASTROESOPHAGEAL REFLUX DISEASE, UNSPECIFIED WHETHER ESOPHAGITIS PRESENT: Primary | ICD-10-CM

## 2024-09-12 PROCEDURE — 99213 OFFICE O/P EST LOW 20 MIN: CPT | Performed by: SPECIALIST

## 2024-09-13 NOTE — PATIENT INSTRUCTIONS
Please see a gastroenterologist concerning her persistent reflux and gastric symptoms.  Continue famotidine on an as-needed basis.  Try to be more compliant with the diet.No greasy foods, spicy foods, chocolate, caffeine, alcohol, spearmint, peppermint, lemon, lime, orange, grapefruit, tomatoes.  Don't eat 2 hours before bedtime  Elevate the head of bed   Follow-up with any additional questions or problems..

## 2024-09-13 NOTE — PROGRESS NOTES
Ladan Rinaldi is a 89 year old female.   Chief Complaint   Patient presents with    Follow - Up     Patient here for 4 month f/u on Gastric reflex, per pt not doing so well      HPI:   Patient taking her famotidine.  The diet she is only following somewhat.  She reports significant bloating.    Current Outpatient Medications   Medication Sig Dispense Refill    levocetirizine 5 MG Oral Tab Take 1 tablet (5 mg total) by mouth every evening. 90 tablet 3    famotidine 40 MG Oral Tab Take 1 tablet (40 mg total) by mouth daily. 30 tablet 5    pantoprazole 40 MG Oral Tab EC Take 1 tablet (40 mg total) by mouth before breakfast.      clotrimazole 1 % External Solution Apply 1 Application. topically 2 (two) times daily. 60 mL 3    brimonidine Tartrate 0.2 % Ophthalmic Solution       Dorzolamide HCl-Timolol Mal 22.3-6.8 MG/ML Ophthalmic Solution INSTILL 1 DROP INTO LEFT EYE TWICE DAILY FOR 30 DAYS      aspirin 81 MG Oral Tab EC Take  by mouth. Aspir-81 81 mg tablet,delayed release      Calcium Carbonate-Vitamin D 500-200 MG-UNIT Oral Tab Take  by mouth. Calcium 500 + D 500 mg (1,250 mg)-200 unit tablet      Multiple Vitamins-Minerals (CENTRUM SILVER) Oral Tab Take  by mouth. take 1 tablet by oral route  every day      Omega-3 Fatty Acids (RA FISH OIL) 1000 MG Oral Cap Take  by mouth. FISH OIL (unknown strength)      cephalexin 500 MG Oral Cap Take 1 capsule (500 mg total) by mouth 2 (two) times daily. 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    Osteoporosis    Other and unspecified hyperlipidemia      Social History:  Social History     Socioeconomic History    Marital status:    Tobacco Use    Smoking status: Never    Smokeless tobacco: Never   Vaping Use    Vaping status: Never Used   Substance and Sexual Activity    Alcohol use: Yes      Alcohol/week: 0.8 standard drinks of alcohol     Types: 1 Glasses of wine per week     Comment: 1 drink weekly    Drug use: No   Other Topics Concern    History of tanning No    Pt has a pacemaker No    Pt has a defibrillator No    Reaction to local anesthetic No     Comment: \"Puts me out\"    Caffeine Concern Yes     Comment: Coffee, 5 cups daily;     Left Handed No    Right Handed Yes    Currently spends a great deal of time in the sun No    Hx of Spending Great Deal of Time in Sun Yes    Bad sunburns in the past No    Tanning Salons in the Past No    Hx of Radiation Treatments No        REVIEW OF SYSTEMS:   GENERAL HEALTH: feels well otherwise  GENERAL : denies fever, chills, sweats, weight loss, weight gain  SKIN: denies any unusual skin lesions or rashes  RESPIRATORY: denies shortness of breath with exertion  NEURO: denies headaches    EXAM:   Ht 5' 2\" (1.575 m)   Wt 110 lb (49.9 kg)   BMI 20.12 kg/m²   System Details   Skin Inspection - Normal.   Constitutional Overall appearance - Normal.   Head/Face Facial features - Normal. Eyebrows - Normal. Skull - Normal.   Eyes Conjunctiva - Right: Normal, Left: Normal. Pupil - Right: Normal, Left: Normal.    Ears Inspection - Right: Normal, Left: Normal.   Canal - Right: Normal, Left: Normal.    TM - Right: Normal, Left: Normal.   Nasal External nose - Normal.   Nasal septum - Normal.  Turbinates - Normal   Oral/Oropharynx Lips - Normal, Tonsils - Normal, Tongue - Normal    Neck Exam Inspection - Normal. Palpation - Normal. Parotid gland - Normal. Thyroid gland - Normal.   Lymph Detail Submental. Submandibular. Anterior cervical. Posterior cervical. Supraclavicular.  All without enlargement   Psychiatric Orientation - Oriented to time, place, person & situation. Appropriate mood and affect.   Neurological Memory - Normal. Cranial nerves - Cranial nerves II through XII grossly intact.   Larynx Mirror examination with slight posterior laryngeal erythema.  No retained  secretions.  No tumors or masses seen.     ASSESSMENT AND PLAN:   1. Gastroesophageal reflux disease, unspecified whether esophagitis present  Can continue famotidine on an as-needed basis.  Patient was once again given the antireflux diet.  I think she would benefit from seeing a gastroenterologist.  I did recommend Dr. Alexandro Loera.    2.  Bloating  Patient to discuss this with gastroenterologist.  Follow-up with any additional questions or problems.        The patient indicates understanding of these issues and agrees to the plan.      Mame Feng MD  9/12/2024  8:30 PM

## 2025-02-03 ENCOUNTER — NURSE TRIAGE (OUTPATIENT)
Dept: INTERNAL MEDICINE CLINIC | Facility: CLINIC | Age: OVER 89
End: 2025-02-03

## 2025-02-03 ENCOUNTER — OFFICE VISIT (OUTPATIENT)
Dept: INTERNAL MEDICINE CLINIC | Facility: CLINIC | Age: OVER 89
End: 2025-02-03

## 2025-02-03 VITALS
DIASTOLIC BLOOD PRESSURE: 78 MMHG | HEART RATE: 80 BPM | WEIGHT: 113 LBS | SYSTOLIC BLOOD PRESSURE: 150 MMHG | BODY MASS INDEX: 21 KG/M2

## 2025-02-03 DIAGNOSIS — I10 PRIMARY HYPERTENSION: ICD-10-CM

## 2025-02-03 DIAGNOSIS — M54.32 LEFT SIDED SCIATICA: Primary | ICD-10-CM

## 2025-02-03 DIAGNOSIS — J84.10 LUNG GRANULOMA (HCC): ICD-10-CM

## 2025-02-03 PROCEDURE — 99213 OFFICE O/P EST LOW 20 MIN: CPT | Performed by: NURSE PRACTITIONER

## 2025-02-03 PROCEDURE — 1159F MED LIST DOCD IN RCRD: CPT | Performed by: NURSE PRACTITIONER

## 2025-02-03 PROCEDURE — 1160F RVW MEDS BY RX/DR IN RCRD: CPT | Performed by: NURSE PRACTITIONER

## 2025-02-03 PROCEDURE — 1125F AMNT PAIN NOTED PAIN PRSNT: CPT | Performed by: NURSE PRACTITIONER

## 2025-02-03 RX ORDER — PREDNISONE 20 MG/1
20 TABLET ORAL 2 TIMES DAILY
Qty: 10 TABLET | Refills: 0 | Status: SHIPPED | OUTPATIENT
Start: 2025-02-03 | End: 2025-02-08

## 2025-02-03 NOTE — TELEPHONE ENCOUNTER
Action Requested: Summary for Provider     []  Critical Lab, Recommendations Needed  [] Need Additional Advice  [x]   FYI    []   Need Orders  [] Need Medications Sent to Pharmacy  []  Other     SUMMARY: Office visit. Patient made an appointment with Hannah today but would appreciate if Dr. Dixon came into the office to say hi and give her recommendations as well. Routing as FYI.    Future Appointments   Date Time Provider Department Center   2/3/2025  3:40 PM Hannah Salazar APRN [id number] [place]   3/3/2025 10:15 AM Bailey Ascencio MD [facility]     Reason for call: Back Pain  Onset: Data Unavailable    Patient has left lower back pain that started Friday night. She is unsure if she twisted it funny. 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 She has no trouble sitting but has tightness on the left side down the leg and when she walks the pain is around 8/10.     Reason for Disposition   Numbness in a leg or foot (i.e., loss of sensation)    Protocols used: Back Pain-A-[place]

## 2025-02-03 NOTE — TELEPHONE ENCOUNTER
Please advise patient to be evaluated, she can see nurse practitioner Jasmina Valentineday or tomorrow, and meanwhile can use Tylenol 500 mg every 6 hours as needed for pain

## 2025-02-04 NOTE — ASSESSMENT & PLAN NOTE
-elevated in clinic, per patient this is normal for her. Home BP reported 120/80.  -Advised to continue checking blood pressure at home and follow-up in clinic if greater than 130/90.  CPM

## 2025-02-04 NOTE — PROGRESS NOTES
Ladan Rinaldi is a 89 year old female.  HPI:   Patient has a history of hypertension, pulmonary nodule, osteoporosis, GERD, here with her niece, reports left-sided low back pain, located in the buttock, for the past couple of days that radiates posteriorly down the thigh.  She reports pain is sharp.  Believes it is her sciatic nerve.  She denies any pain along the lower spine.  She denies any numbness or tingling, saddle block anesthesia, bilateral sciatica, loss of bladder or bowel control.  She is using Tylenol currently but it is not helping.  She is able to ambulate and bear weight.  BP elevated in clinic, patient reports whitecoat hypertension and always has elevated blood pressure in clinic.  She reports home blood pressures are normal around 120/80.  She denies any chest pain shortness of breath palpitations dizziness nausea vomiting diarrhea constipation appetite or weight changes.  Current Outpatient Medications   Medication Sig Dispense Refill    predniSONE 20 MG Oral Tab Take 1 tablet (20 mg total) by mouth 2 (two) times daily for 5 days. 10 tablet 0    clotrimazole 1 % External Solution Apply 1 Application. topically 2 (two) times daily. 60 mL 3    brimonidine Tartrate 0.2 % Ophthalmic Solution       Dorzolamide HCl-Timolol Mal 22.3-6.8 MG/ML Ophthalmic Solution INSTILL 1 DROP INTO LEFT EYE TWICE DAILY FOR 30 DAYS      aspirin 81 MG Oral Tab EC Take  by mouth. Aspir-81 81 mg tablet,delayed release      Calcium Carbonate-Vitamin D 500-200 MG-UNIT Oral Tab Take  by mouth. Calcium 500 + D 500 mg (1,250 mg)-200 unit tablet      Multiple Vitamins-Minerals (CENTRUM SILVER) Oral Tab Take  by mouth. take 1 tablet by oral route  every day      levocetirizine 5 MG Oral Tab Take 1 tablet (5 mg total) by mouth every evening. (Patient not taking: Reported on 2/3/2025) 90 tablet 3    famotidine 40 MG Oral Tab Take 1 tablet (40 mg total) by mouth daily. (Patient not taking: Reported on 2/3/2025) 30 tablet 5     Omega-3 Fatty Acids (RA FISH OIL) 1000 MG Oral Cap Take  by mouth. FISH OIL (unknown strength) (Patient not taking: Reported on 2/3/2025)        Past Medical History:    Atypical nevus    skin of right cheek    Cancer (HCC)    Melanoma in situ of cheek (HCC)    \"Malignant melanoma in situ - Right cheek    Osteoporosis    Other and unspecified hyperlipidemia      Social History:  Social History     Socioeconomic History    Marital status:    Tobacco Use    Smoking status: Never     Passive exposure: Never    Smokeless tobacco: Never   Vaping Use    Vaping status: Never Used   Substance and Sexual Activity    Alcohol use: Yes     Alcohol/week: 0.8 standard drinks of alcohol     Types: 1 Glasses of wine per week     Comment: 1 drink weekly    Drug use: No   Other Topics Concern    History of tanning No    Pt has a pacemaker No    Pt has a defibrillator No    Reaction to local anesthetic No     Comment: \"Puts me out\"    Caffeine Concern Yes     Comment: Coffee, 5 cups daily;     Left Handed No    Right Handed Yes    Currently spends a great deal of time in the sun No    Hx of Spending Great Deal of Time in Sun Yes    Bad sunburns in the past No    Tanning Salons in the Past No    Hx of Radiation Treatments No        REVIEW OF SYSTEMS:   Review of Systems   Constitutional:  Negative for activity change, appetite change, chills, diaphoresis, fatigue, fever and unexpected weight change.   HENT:  Negative for congestion.    Eyes:  Negative for visual disturbance.   Respiratory:  Negative for cough, chest tightness, shortness of breath and wheezing.    Cardiovascular:  Negative for chest pain, palpitations and leg swelling.   Gastrointestinal:  Negative for abdominal pain, constipation, diarrhea, nausea and vomiting.   Endocrine: Negative.    Genitourinary:  Negative for difficulty urinating and vaginal bleeding.   Musculoskeletal:  Positive for back pain. Negative for arthralgias.   Skin: Negative.    Neurological:   Negative for dizziness, seizures, numbness and headaches.   Hematological: Negative.    Psychiatric/Behavioral: Negative.            EXAM:   /78   Pulse 80   Wt 113 lb (51.3 kg)   BMI 20.67 kg/m²     Physical Exam  Vitals reviewed.   Constitutional:       General: She is not in acute distress.     Appearance: Normal appearance.   HENT:      Head: Normocephalic.   Eyes:      General: No scleral icterus.     Conjunctiva/sclera: Conjunctivae normal.   Cardiovascular:      Rate and Rhythm: Normal rate and regular rhythm.      Pulses: Normal pulses.      Heart sounds: Normal heart sounds.   Pulmonary:      Effort: Pulmonary effort is normal.      Breath sounds: Normal breath sounds.   Musculoskeletal:         General: No swelling.      Lumbar back: Tenderness present. No swelling, edema, deformity, signs of trauma, lacerations, spasms or bony tenderness. Normal range of motion. Positive left straight leg raise test. Negative right straight leg raise test. No scoliosis.      Right lower leg: No edema.      Left lower leg: No edema.      Comments: TTP (deep)of left buttock.    Skin:     General: Skin is warm.      Coloration: Skin is not jaundiced.      Findings: No bruising or erythema.   Neurological:      General: No focal deficit present.      Mental Status: She is alert and oriented to person, place, and time.      Cranial Nerves: No cranial nerve deficit.      Sensory: No sensory deficit.      Motor: No weakness.      Gait: Gait normal.   Psychiatric:         Thought Content: Thought content normal.         Judgment: Judgment normal.            ASSESSMENT AND PLAN:     Assessment & Plan  Left sided sciatica  -No red flags, reviewed alarm signs.   -Referral to physiatry.   -Start steroid burst, topical biofreeze.  -Stretches.   -Imaging ordered.  Orders:    XR LUMBAR SPINE (MIN 4 VIEWS) (CPT=72110); Future    PHYSIATRY - INTERNAL    predniSONE 20 MG Oral Tab; Take 1 tablet (20 mg total) by mouth 2 (two) times  daily for 5 days.    Primary hypertension  -elevated in clinic, per patient this is normal for her. Home BP reported 120/80.  -Advised to continue checking blood pressure at home and follow-up in clinic if greater than 130/90.  CPM       Lung granuloma (HCC)  -Patient reports she is following with pulmonologist and is scheduled for repeat imaging in a few months.  Advised to continue follow-up with pulmonologist.            The patient indicates understanding of these issues and agrees to the plan.  The patient is asked to return in 2-4 weeks/PRN.     The above note was creating using Dragon speech recognition technology. Please excuse any typos.

## 2025-02-04 NOTE — ASSESSMENT & PLAN NOTE
-No red flags, reviewed alarm signs.   -Referral to physiatry.   -Start steroid burst, topical biofreeze.  -Stretches.   -Imaging ordered.  Orders:    XR LUMBAR SPINE (MIN 4 VIEWS) (CPT=72110); Future    PHYSIATRY - INTERNAL    predniSONE 20 MG Oral Tab; Take 1 tablet (20 mg total) by mouth 2 (two) times daily for 5 days.

## 2025-02-06 ENCOUNTER — TELEPHONE (OUTPATIENT)
Facility: LOCATION | Age: OVER 89
End: 2025-02-06

## 2025-02-06 NOTE — TELEPHONE ENCOUNTER
Submitted prolia iv via [employer]. Awaiting 3-5 business day. Patient has an appointment on 3/3/25.

## 2025-02-10 ENCOUNTER — HOSPITAL ENCOUNTER (OUTPATIENT)
Dept: GENERAL RADIOLOGY | Facility: HOSPITAL | Age: OVER 89
Discharge: HOME | End: 2025-02-10
Attending: NURSE PRACTITIONER
Payer: MEDICARE

## 2025-02-10 DIAGNOSIS — M54.32 LEFT SIDED SCIATICA: ICD-10-CM

## 2025-02-10 PROCEDURE — 72110 X-RAY EXAM L-2 SPINE 4/>VWS: CPT | Performed by: NURSE PRACTITIONER

## 2025-02-11 ENCOUNTER — TELEPHONE (OUTPATIENT)
Dept: INTERNAL MEDICINE CLINIC | Facility: CLINIC | Age: OVER 89
End: 2025-02-11

## 2025-02-11 NOTE — TELEPHONE ENCOUNTER
Pt was advised to see physiatry, has she been able to schedule an appt? I would recommend scheduling asap

## 2025-02-11 NOTE — TELEPHONE ENCOUNTER
Patient states she is still having a lot back pain. Patient states she was seen in the office 2/3/25.  She went for an x-ray yesterday, results in process. Patient states she finished the prednisone and is only taking Tylenol for the pain. Please advise.

## 2025-02-11 NOTE — TELEPHONE ENCOUNTER
Patient was called and inform of Valentin Salazar message below and she verbalized understanding. Patient was then transfer to Dr. Troncoso office so she can make the appointment.      Referred to Provider Information:  Provider Address Phone   Felisa Troncoso, DO [address]  [facility] [address] [phone]

## 2025-02-12 ENCOUNTER — OFFICE VISIT (OUTPATIENT)
Dept: PHYSICAL MEDICINE AND REHAB | Facility: CLINIC | Age: OVER 89
End: 2025-02-12
Payer: MEDICARE

## 2025-02-12 VITALS — HEIGHT: 62 IN | WEIGHT: 113 LBS | BODY MASS INDEX: 20.8 KG/M2

## 2025-02-12 DIAGNOSIS — M51.369 BULGE OF LUMBAR DISC WITHOUT MYELOPATHY: ICD-10-CM

## 2025-02-12 DIAGNOSIS — M54.59 MECHANICAL LOW BACK PAIN: ICD-10-CM

## 2025-02-12 DIAGNOSIS — M54.16 LUMBAR RADICULOPATHY: ICD-10-CM

## 2025-02-12 DIAGNOSIS — M51.372 DEGENERATION OF INTERVERTEBRAL DISC OF LUMBOSACRAL REGION WITH DISCOGENIC BACK PAIN AND LOWER EXTREMITY PAIN: ICD-10-CM

## 2025-02-12 DIAGNOSIS — M47.816 FACET SYNDROME, LUMBAR: ICD-10-CM

## 2025-02-12 DIAGNOSIS — M48.061 LUMBAR FORAMINAL STENOSIS: ICD-10-CM

## 2025-02-12 DIAGNOSIS — M47.816 LUMBAR SPONDYLOSIS: Primary | ICD-10-CM

## 2025-02-12 DIAGNOSIS — M99.9 BIOMECHANICAL LESION: ICD-10-CM

## 2025-02-12 DIAGNOSIS — M79.10 MYALGIA: ICD-10-CM

## 2025-02-12 PROCEDURE — 1159F MED LIST DOCD IN RCRD: CPT | Performed by: PHYSICAL MEDICINE & REHABILITATION

## 2025-02-12 PROCEDURE — 1160F RVW MEDS BY RX/DR IN RCRD: CPT | Performed by: PHYSICAL MEDICINE & REHABILITATION

## 2025-02-12 PROCEDURE — 1125F AMNT PAIN NOTED PAIN PRSNT: CPT | Performed by: PHYSICAL MEDICINE & REHABILITATION

## 2025-02-12 PROCEDURE — 99204 OFFICE O/P NEW MOD 45 MIN: CPT | Performed by: PHYSICAL MEDICINE & REHABILITATION

## 2025-02-12 RX ORDER — GABAPENTIN 100 MG/1
100 CAPSULE ORAL 3 TIMES DAILY
Qty: 90 CAPSULE | Refills: 0 | Status: SHIPPED | OUTPATIENT
Start: 2025-02-12

## 2025-02-12 NOTE — H&P
[facility]  Clinic H&P    Requesting Physician: Angela Dixon MD    Chief Complaint (Reason for Visit):    Chief Complaint   Patient presents with    New Patient     New R handed pt presenting with L sided low back pain radiating into entire outer L leg through foot for 10 days. Pain started after reaching for bag on floor of car. Pain 6-8/10 when putting pressure on L leg. Admits occasional N/T from L knee to L ankle. Admits weakness. Pt takes tylenol daily with some improvement, Dr. Salazar gave her prednisone for 5 days which temporarily helped. No tx yet. XR L spine 2/10/25. No prior surgeries/injections.       History of Present Illness:  The patient is a 89 year old RIGHT handed female with significant past medical history of HLD and osteoporosis who presents with left side low back pain with radiation down the left leg tot he lateral ankle which she rates as high as a 6-8/10 when standing and walking. Her pain is worse with laying on the left side. She has had X-rays recently. She tried Tylenol and oral steroids.  .She denies previous injections. She has numbness and tingling/tightness in the left lateral lower leg to the ankle. She is very active and walks 3 miles daily.     PAST MEDICAL HISTORY:   Past Medical History:    Atypical nevus    skin of right cheek    Cancer (HCC)    Melanoma in situ of cheek (HCC)    \"Malignant melanoma in situ - Right cheek    Osteoporosis    Other and unspecified hyperlipidemia       PAST SURGICAL HISTORY:   Past Surgical History:   Procedure Laterality Date    Adenoidectomy      Cataract Bilateral     Colonoscopy      D & c      Tonsillectomy          FAMILY HISTORY:   Family History   Problem Relation Age of Onset    Heart Disorder Father     Diabetes Mother     Ovarian Cancer Sister         colon cancer    Colon Cancer Sister     Skin cancer Sister         unknown type    Colon Cancer Sister     Ovarian Cancer Sister     Breast  Cancer Sister     Colon Cancer Other         Family H/o       SOCIAL HISTORY:   Social History     Occupational History    Not on file   Tobacco Use    Smoking status: Never     Passive exposure: Never    Smokeless tobacco: Never   Vaping Use    Vaping status: Never Used   Substance and Sexual Activity    Alcohol use: Yes     Alcohol/week: 0.8 standard drinks of alcohol     Types: 1 Glasses of wine per week     Comment: 1 drink weekly    Drug use: No    Sexual activity: Not on file       CURRENT MEDICATIONS:   Current Outpatient Medications   Medication Sig Dispense Refill    gabapentin 100 MG Oral Cap Take 1 capsule (100 mg total) by mouth 3 (three) times daily. 90 capsule 0    levocetirizine 5 MG Oral Tab Take 1 tablet (5 mg total) by mouth every evening. (Patient not taking: Reported on 2/3/2025) 90 tablet 3    famotidine 40 MG Oral Tab Take 1 tablet (40 mg total) by mouth daily. (Patient not taking: Reported on 2/3/2025) 30 tablet 5    clotrimazole 1 % External Solution Apply 1 Application. topically 2 (two) times daily. 60 mL 3    brimonidine Tartrate 0.2 % Ophthalmic Solution       Dorzolamide HCl-Timolol Mal 22.3-6.8 MG/ML Ophthalmic Solution INSTILL 1 DROP INTO LEFT EYE TWICE DAILY FOR 30 DAYS      aspirin 81 MG Oral Tab EC Take  by mouth. Aspir-81 81 mg tablet,delayed release      Calcium Carbonate-Vitamin D 500-200 MG-UNIT Oral Tab Take  by mouth. Calcium 500 + D 500 mg (1,250 mg)-200 unit tablet      Multiple Vitamins-Minerals (CENTRUM SILVER) Oral Tab Take  by mouth. take 1 tablet by oral route  every day      Omega-3 Fatty Acids (RA FISH OIL) 1000 MG Oral Cap Take  by mouth. FISH OIL (unknown strength) (Patient not taking: Reported on 2/3/2025)          ALLERGIES:   Allergies[1]      REVIEW OF SYSTEMS:   Review of Systems   Constitutional: Negative.    HENT: Negative.    Eyes: Negative.    Respiratory: Negative.    Cardiovascular: Negative.    Gastrointestinal: Negative.    Genitourinary: Negative.     Musculoskeletal: As per HPI   Skin: Negative.    Neurological: As per HPI  Endo/Heme/Allergies: Negative.    Psychiatric/Behavioral: Negative.      All other systems reviewed and are negative. Pertinent positives and negatives noted in the HPI.        PHYSICAL EXAM:   Ht 62\"   Wt 113 lb (51.3 kg)   BMI 20.67 kg/m²     Body mass index is 20.67 kg/m².      General: No immediate distress  Head: Normocephalic/ Atraumatic  Eyes: Extra-occular movements intact.   Ears: No auricular hematoma or deformities  Mouth: No lesions or ulcerations  Heart: peripheral pulses intact. Normal capillary refill.   Lungs: Non-labored respirations  Abdomen: No abdominal guarding  Extremities: No lower extremity edema bilaterally   Skin: No lesions noted.   Cognition: alert & oriented x 3, attentive, able to follow 2 step commands, comprehension intact, spontaneous speech intact  Motor:    Musculoskeletal:    LUMBAR SPINE:  Inspection: no erythema, swelling, or obvious deformity.  Their iliac crest and shoulder heights are symmetrical.  Postural exam reveals no scoliosis or kyphosis.  Palpation: TTP over the left lower lumbar facets and paraspinals, left gluteal and piriformis, left greater trochanter  ROM: FAROM  Motor: 5/5 in all myotomes of the BILATERAL lower extremities except 4/5 during LEFT GTE and plantarflexion  Sensation: Intact to light touch in all dermatomes of the lower extremities   Reflexes: 1/4 at L4 and S1  Facet Loading: no specific facet pain  RADHA: Negative  Straight leg raise: negative for radicular pain symptoms  Slump test: negative for pain symptoms or radicular pain symptoms      Gait:  Normal    Data  No visits with results within 6 Month(s) from this visit.   Latest known visit with results is:   [facility] Lab Encounter on 05/20/2024   Component Date Value Ref Range Status    Urine Color 05/20/2024 Light-Yellow  Yellow Final    Clarity Urine 05/20/2024 Turbid (A)  Clear Final    Spec Gravity 05/20/2024 1.009   1.005 - 1.030 Final    Glucose Urine 05/20/2024 Normal  Normal mg/dL Final    Bilirubin Urine 05/20/2024 Negative  Negative Final    Ketones Urine 05/20/2024 Negative  Negative mg/dL Final    Blood Urine 05/20/2024 Negative  Negative Final    pH Urine 05/20/2024 6.5  5.0 - 8.0 Final    Protein Urine 05/20/2024 Negative  Negative mg/dL Final    Urobilinogen Urine 05/20/2024 Normal  Normal Final    Nitrite Urine 05/20/2024 2+ (A)  Negative Final    Leukocyte Esterase Urine 05/20/2024 500 (A)  Negative Final    WBC Urine 05/20/2024 >50 (A)  0 - 5 /HPF Final    RBC Urine 05/20/2024 3-5 (A)  0 - 2 /HPF Final    Bacteria Urine 05/20/2024 1+ (A)  None Seen /HPF Final    Squamous Epi. Cells 05/20/2024 None Seen  None Seen /HPF Final    Renal Tubular Epithelial Cells 05/20/2024 None Seen  None Seen /HPF Final    Transitional Cells 05/20/2024 None Seen  None Seen /HPF Final    Yeast Urine 05/20/2024 None Seen  None Seen /HPF Final    WBC Clump 05/20/2024 Present (A)  None Seen /HPF Final    Urine Culture 05/20/2024 >100,000 CFU/ML Escherichia coli (A)   Final   ]      Radiology Imaging:  I reviewed with the patient her X-ray of the lumbar spine  PROCEDURE: XR LUMBAR SPINE (MIN 4 VIEWS) (CPT=72110)     COMPARISON: None available.     INDICATIONS: Left-sided back pain and decreased range of motion; lumbar radiculopathy radiating down lateral leg over the preceding 1 week. No precipitating antecedent injury.     TECHNIQUE: Lumbar spine radiographs (minimum 4 views)       FINDINGS:  ALIGNMENT:   The anatomic lumbar lordosis is minimally interrupted by trace anterolisthesis of L4 on L5.  VERTEBRAL BODIES:   A total of 5 non-rib-bearing lumbar-type vertebral bodies are identified. No fracture, subluxation, or bony lesion is visible. Multilevel anterior osteophyte formation is appreciated. 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  DISC SPACES: There is mild intervertebral disc space narrowing at the L4-L5  level.  SACROILIAC JOINTS: Mild sclerosis is perceived.  OBLIQUE VIEWS: No defects of the pars interarticularis are identified. There is mild facet arthrosis of the lower lumbar levels.                  Impression   CONCLUSION:  1. Mild multilevel degenerative changes of the lumbar spine, particularly at L4-L5.     2. No radiographically visible acute osseous injury of the lumbar spine.           elm-remote.        Dictated by (CST): Josh Bell MD on 2/13/2025 at 11:03 PM      Finalized by (CST): Josh Bell MD on 2/13/2025 at 11:06 PM       ASSESSMENT AND PLAN:  Ladan is a pleasant 89 year old female who presents with left sided low back pain with radiation down the left leg in an L5 and S1 distribution. She has weakness with LEFt GTE and plantarflexion. I have reviewed her XR of the lumbar spine which demonstrates multilevel spondylosis. I ma recommending and MRI of the lumbar spine, Gabapentin 100 mg and Tylenol for pain. She should begin physical therapy as soon as possible. She will follow up with me to review the MRI and discuss next steps.        RTC in 6 weeks    Discharge Instructions were provided as documented in AVS summary.  The patient was in agreement with the assessment and plan.  All questions were answered.  There were no barriers to learning.         1. Lumbar spondylosis    2. Degeneration of intervertebral disc of lumbosacral region with discogenic back pain and lower extremity pain    3. Lumbar foraminal stenosis    4. Bulge of lumbar disc without myelopathy    5. Lumbar radiculopathy    6. Myalgia    7. Facet syndrome, lumbar    8. Mechanical low back pain    9. Biomechanical lesion        Alex B. Behar MD, [facility]  Physical Medicine and Rehabilitation/Sports Medicine  [facility]      [employer] Cures Act Notice to Patient: Medical documents like this are made available to patients in the interest of transparency. However, be advised this is a medical  document and it is intended as aata-in-hdlr communication between your medical providers. This medical document may contain abbreviations, assessments, medical data, and results or other terms that are unfamiliar. Medical documents are intended to carry relevant information, facts as evident, and the clinical opinion of the practitioner. As such, this medical document may be written in language that appears blunt or direct. You are encouraged to contact your medical provider and/or [facility] if you have any questions about this medical document.          [1] No Known Allergies

## 2025-02-12 NOTE — PATIENT INSTRUCTIONS
1) Tylenol 500-1000 mg every 6-8 hours as needed for pain.  No more than 3000 mg daily.  2) Please call and schedule your MRI of the Lumbar spine at [phone].  Once you have your MRI scheduled, then call my office again to schedule a follow-up visit soon after your MRI so we may review the images together.  3) Please begin physical therapy as soon as possible.   4) Start gabapentin 100 mg three times per day.Start with 1 tablet at night, and if no side effects, then can take twice per day  5) Follow-up with me in 6 to 8 weeks after you  begin physical therapy.

## 2025-02-19 ENCOUNTER — APPOINTMENT (OUTPATIENT)
Dept: GENERAL RADIOLOGY | Facility: HOSPITAL | Age: OVER 89
End: 2025-02-19
Attending: EMERGENCY MEDICINE
Payer: MEDICARE

## 2025-02-19 ENCOUNTER — HOSPITAL ENCOUNTER (EMERGENCY)
Facility: HOSPITAL | Age: OVER 89
Discharge: HOME | End: 2025-02-19
Attending: EMERGENCY MEDICINE
Payer: MEDICARE

## 2025-02-19 ENCOUNTER — APPOINTMENT (OUTPATIENT)
Dept: ULTRASOUND IMAGING | Facility: HOSPITAL | Age: OVER 89
End: 2025-02-19
Attending: EMERGENCY MEDICINE
Payer: MEDICARE

## 2025-02-19 ENCOUNTER — APPOINTMENT (OUTPATIENT)
Dept: ULTRASOUND IMAGING | Facility: HOSPITAL | Age: OVER 89
End: 2025-02-19
Payer: MEDICARE

## 2025-02-19 VITALS
HEART RATE: 95 BPM | OXYGEN SATURATION: 97 % | HEIGHT: 62 IN | SYSTOLIC BLOOD PRESSURE: 191 MMHG | TEMPERATURE: 98 F | RESPIRATION RATE: 15 BRPM | BODY MASS INDEX: 20.8 KG/M2 | DIASTOLIC BLOOD PRESSURE: 89 MMHG | WEIGHT: 113 LBS

## 2025-02-19 DIAGNOSIS — I82.452 ACUTE DEEP VEIN THROMBOSIS (DVT) OF LEFT PERONEAL VEIN (HCC): Primary | ICD-10-CM

## 2025-02-19 DIAGNOSIS — M71.22 SYNOVIAL CYST OF LEFT POPLITEAL SPACE: ICD-10-CM

## 2025-02-19 PROCEDURE — 93971 EXTREMITY STUDY: CPT | Performed by: EMERGENCY MEDICINE

## 2025-02-19 PROCEDURE — 73560 X-RAY EXAM OF KNEE 1 OR 2: CPT | Performed by: EMERGENCY MEDICINE

## 2025-02-19 PROCEDURE — 99284 EMERGENCY DEPT VISIT MOD MDM: CPT

## 2025-02-19 RX ORDER — LIDOCAINE 50 MG/G
1 PATCH TOPICAL EVERY 24 HOURS
Qty: 10 PATCH | Refills: 0 | Status: SHIPPED | OUTPATIENT
Start: 2025-02-19

## 2025-02-20 ENCOUNTER — TELEPHONE (OUTPATIENT)
Dept: INTERNAL MEDICINE CLINIC | Facility: CLINIC | Age: OVER 89
End: 2025-02-20

## 2025-02-20 NOTE — TELEPHONE ENCOUNTER
Please see below, pt should have cbc and bmp done as well, ask stuff to call pharmacy and see of pharmacy have coupon or we have coupon for 1 month free med until pt is seen , speak to pt  and explain importance at least starting eliquis for couple weeks am to be switched hopefully to warfarin

## 2025-02-20 NOTE — DISCHARGE INSTRUCTIONS
Start taking Eliquis as prescribed.  Follow-up with your primary care provider in 1 to 2 days.  Return to the ER if some continue, get worse, unable to follow-up

## 2025-02-20 NOTE — TELEPHONE ENCOUNTER
Patient contacts clinic reporting she was seen in emergency room and diagnosed with DVT, prescribed eliquis which she cannot afford.  Nurse discussed inexpensive alternative coumadin but patient does not want to undergo blood monitoring.  Office staff please advise if you have any coupons or copay cards available for the patient.  Follow up scheduled 02/24.    Triage support can we please see if this medication can get a prior authorization?

## 2025-02-20 NOTE — ED PROVIDER NOTES
Patient Seen in: [facility] Emergency Department    History     Chief Complaint   Patient presents with    Leg Swelling       HPI    89-year-old female who presents here today complaint of left calf pain.  Patient has been having issues with left leg sciatica over the past 3 weeks.  Today she noticed some pain in her calf and around the knee.  No injury or trauma no redness or swelling.  Patient does get around too much and family wanted to make sure she does have a blood clot    History reviewed.   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   Past Surgical History:   Procedure Laterality Date    Adenoidectomy      Cataract Bilateral     Colonoscopy      D & c      Tonsillectomy           Medications :  Prescriptions Prior to Admission[1]     Family History   Problem Relation Age of Onset    Heart Disorder Father     Diabetes Mother     Ovarian Cancer Sister         colon cancer    Colon Cancer Sister     Skin cancer Sister         unknown type    Colon Cancer Sister     Ovarian Cancer Sister     Breast Cancer Sister     Colon Cancer Other         Family H/o       Smoking Status:   Social History     Socioeconomic History    Marital status:    Tobacco Use    Smoking status: Never     Passive exposure: Never    Smokeless tobacco: Never   Vaping Use    Vaping status: Never Used   Substance and Sexual Activity    Alcohol use: Yes     Alcohol/week: 0.8 standard drinks of alcohol     Types: 1 Glasses of wine per week     Comment: 1 drink weekly    Drug use: No   Other Topics Concern    History of tanning No    Pt has a pacemaker No    Pt has a defibrillator No    Reaction to local anesthetic No     Comment: \"Puts me out\"    Caffeine Concern Yes     Comment: Coffee, 5 cups daily;     Left Handed No    Right Handed  Yes    Currently spends a great deal of time in the sun No    Hx of Spending Great Deal of Time in Sun Yes    Bad sunburns in the past No    Tanning Salons in the Past No    Hx of Radiation Treatments No       Constitutional and vital signs reviewed.      Social History and Family History elements reviewed from today, pertinent positives to the presenting problem noted.    Physical Exam     ED Triage Vitals [02/19/25 1842]   BP (!) 198/85   Pulse 87   Resp 18   Temp 98 °F (36.7 °C)   Temp src    SpO2 99 %   O2 Device None (Room air)       All measures to prevent infection transmission during my interaction with the patient were taken. Handwashing was performed prior to and after the exam.  Stethoscope and any equipment used during my examination was cleaned with super sani-cloth germicidal wipes following the exam.     Physical Exam  Vitals and nursing note reviewed.   Cardiovascular:      Rate and Rhythm: Normal rate.      Pulses: Normal pulses.   Pulmonary:      Effort: Pulmonary effort is normal.   Abdominal:      Palpations: Abdomen is soft.   Musculoskeletal:         General: Normal range of motion.      Comments: No reproducible pain to the calf.  Patient points to the posterior lateral calf area where the pain is.  No erythema or swelling.  Good distal pulses.   Skin:     General: Skin is warm and dry.   Neurological:      General: No focal deficit present.      Mental Status: She is alert.         ED Course      Labs Reviewed - No data to display    As Interpreted by me    Imaging Results Available and Reviewed while in ED: US VENOUS DOPPLER LEG LEFT - DIAG IMG (CPT=93971)    Result Date: 2/19/2025  CONCLUSION:  1. A left calf vein DVT involving a peroneal vein. 2. No left femoral or popliteal DVT. 3. Popliteal/Baker's cyst.     Dictated by (CST): Goldy Villagomez MD on 2/19/2025 at 9:10 PM     Finalized by (CST): Goldy Villagomez MD on 2/19/2025 at 9:13 PM         ED Medications Administered: Medications - No  data to display      [facility]     Vitals:    02/19/25 1842   BP: (!) 198/85   Pulse: 87   Resp: 18   Temp: 98 °F (36.7 °C)   SpO2: 99%   Weight: 51.3 kg   Height: 157.5 cm (5' 2\")     *I personally reviewed and interpreted all ED vitals.    Pulse Ox: 99%, Room air, Normal       Differential Diagnosis/ Diagnostic Considerations: Muscle strain, DVT, Baker's cyst,    Complicating Factors: The patient already has does not have any pertinent problems on file. to contribute to the complexity of this ED evaluation.    Medical Decision Making  Amount and/or Complexity of Data Reviewed  Radiology: ordered and independent interpretation performed. Decision-making details documented in ED Course.    Risk  OTC drugs.  Prescription drug management.      I reviewed all results with patient and family at the bedside.  X-ray shows nothing acute.  I reviewed venous ultrasound results and patient does have a DVT.  Explained to the patient and family that we will start her on Eliquis to treat the DVT.  Will also give lidocaine patch as needed for pain.  Follow-up with her primary care provider in 1 to 2 days.  Return to the ER if some continue, get worse, unable to follow-up  Condition upon leaving the department: Stable    Disposition and Plan     Clinical Impression:  1. Acute deep vein thrombosis (DVT) of left peroneal vein (HCC)    2. Synovial cyst of left popliteal space        Disposition:  Discharge    Follow-up:  Angela Dixon MD  [address] [place] [address] [phone]    Follow up        Medications Prescribed:  Current Discharge Medication List        START taking these medications    Details   apixaban 5 MG Oral Tab Take 2 tabs (10mg) by mouth twice daily for 7 days, then take 1 tab (5mg) by mouth twice daily thereafter.  Qty: 74 tablet, Refills: 0      lidocaine 5 % External Patch Place 1 patch onto the skin daily.  Qty: 10 patch, Refills: 0    Associated Diagnoses: Acute deep vein thrombosis (DVT) of left peroneal  vein (HCC); Synovial cyst of left popliteal space                              [1] (Not in a hospital admission)

## 2025-02-20 NOTE — ED QUICK NOTES
Pt AO 4. Wheel chair used to take patient to vehicle. No questions or concerns about discharge instructions.

## 2025-02-20 NOTE — TELEPHONE ENCOUNTER
Provider must agree to take over script and send new script to the pharmacy before a prior auth can be done

## 2025-02-20 NOTE — TELEPHONE ENCOUNTER
Spoke to patient, she already picked up prescription.  Will see Hannah on Monday.  Advised patient to get CBC and BMP done I placed order

## 2025-02-23 ENCOUNTER — LAB ENCOUNTER (OUTPATIENT)
Dept: LAB | Facility: HOSPITAL | Age: OVER 89
End: 2025-02-23
Attending: INTERNAL MEDICINE
Payer: MEDICARE

## 2025-02-23 DIAGNOSIS — I82.452 ACUTE DEEP VEIN THROMBOSIS (DVT) OF LEFT PERONEAL VEIN (HCC): ICD-10-CM

## 2025-02-23 LAB
ALBUMIN SERPL-MCNC: 4.2 G/DL (ref 3.2–4.8)
ALBUMIN/GLOB SERPL: 1.9 {RATIO} (ref 1–2)
ALP LIVER SERPL-CCNC: 66 U/L
ALT SERPL-CCNC: 35 U/L
ANION GAP SERPL CALC-SCNC: 6 MMOL/L (ref 0–18)
AST SERPL-CCNC: 29 U/L (ref ?–34)
BASOPHILS # BLD AUTO: 0.03 X10(3) UL (ref 0–0.2)
BASOPHILS NFR BLD AUTO: 0.7 %
BILIRUB SERPL-MCNC: 0.7 MG/DL (ref 0.2–0.9)
BUN BLD-MCNC: 17 MG/DL (ref 9–23)
BUN/CREAT SERPL: 25.8 (ref 10–20)
CALCIUM BLD-MCNC: 9 MG/DL (ref 8.7–10.4)
CHLORIDE SERPL-SCNC: 103 MMOL/L (ref 98–112)
CO2 SERPL-SCNC: 30 MMOL/L (ref 21–32)
CREAT BLD-MCNC: 0.66 MG/DL
DEPRECATED RDW RBC AUTO: 47.7 FL (ref 35.1–46.3)
EGFRCR SERPLBLD CKD-EPI 2021: 81 ML/MIN/1.73M2 (ref 60–?)
EOSINOPHIL # BLD AUTO: 0.13 X10(3) UL (ref 0–0.7)
EOSINOPHIL NFR BLD AUTO: 2.8 %
ERYTHROCYTE [DISTWIDTH] IN BLOOD BY AUTOMATED COUNT: 13.4 % (ref 11–15)
FASTING STATUS PATIENT QL REPORTED: NO
GLOBULIN PLAS-MCNC: 2.2 G/DL (ref 2–3.5)
GLUCOSE BLD-MCNC: 116 MG/DL (ref 70–99)
HCT VFR BLD AUTO: 37.7 %
HGB BLD-MCNC: 12.8 G/DL
IMM GRANULOCYTES # BLD AUTO: 0.01 X10(3) UL (ref 0–1)
IMM GRANULOCYTES NFR BLD: 0.2 %
LYMPHOCYTES # BLD AUTO: 1.27 X10(3) UL (ref 1–4)
LYMPHOCYTES NFR BLD AUTO: 27.8 %
MCH RBC QN AUTO: 33.3 PG (ref 26–34)
MCHC RBC AUTO-ENTMCNC: 34 G/DL (ref 31–37)
MCV RBC AUTO: 98.2 FL
MONOCYTES # BLD AUTO: 0.63 X10(3) UL (ref 0.1–1)
MONOCYTES NFR BLD AUTO: 13.8 %
NEUTROPHILS # BLD AUTO: 2.5 X10 (3) UL (ref 1.5–7.7)
NEUTROPHILS # BLD AUTO: 2.5 X10(3) UL (ref 1.5–7.7)
NEUTROPHILS NFR BLD AUTO: 54.7 %
OSMOLALITY SERPL CALC.SUM OF ELEC: 291 MOSM/KG (ref 275–295)
PLATELET # BLD AUTO: 177 10(3)UL (ref 150–450)
POTASSIUM SERPL-SCNC: 3.9 MMOL/L (ref 3.5–5.1)
PROT SERPL-MCNC: 6.4 G/DL (ref 5.7–8.2)
RBC # BLD AUTO: 3.84 X10(6)UL
SODIUM SERPL-SCNC: 139 MMOL/L (ref 136–145)
WBC # BLD AUTO: 4.6 X10(3) UL (ref 4–11)

## 2025-02-23 PROCEDURE — 80053 COMPREHEN METABOLIC PANEL: CPT

## 2025-02-23 PROCEDURE — 36415 COLL VENOUS BLD VENIPUNCTURE: CPT

## 2025-02-23 PROCEDURE — 85025 COMPLETE CBC W/AUTO DIFF WBC: CPT

## 2025-02-24 ENCOUNTER — TELEPHONE (OUTPATIENT)
Age: OVER 89
End: 2025-02-24

## 2025-02-24 ENCOUNTER — OFFICE VISIT (OUTPATIENT)
Dept: INTERNAL MEDICINE CLINIC | Facility: CLINIC | Age: OVER 89
End: 2025-02-24

## 2025-02-24 VITALS
BODY MASS INDEX: 20.8 KG/M2 | SYSTOLIC BLOOD PRESSURE: 135 MMHG | WEIGHT: 113 LBS | HEIGHT: 62 IN | HEART RATE: 61 BPM | DIASTOLIC BLOOD PRESSURE: 70 MMHG

## 2025-02-24 DIAGNOSIS — I82.452 ACUTE DEEP VEIN THROMBOSIS (DVT) OF LEFT PERONEAL VEIN (HCC): Primary | ICD-10-CM

## 2025-02-24 PROCEDURE — 1126F AMNT PAIN NOTED NONE PRSNT: CPT | Performed by: NURSE PRACTITIONER

## 2025-02-24 PROCEDURE — 99213 OFFICE O/P EST LOW 20 MIN: CPT | Performed by: NURSE PRACTITIONER

## 2025-02-24 PROCEDURE — 1159F MED LIST DOCD IN RCRD: CPT | Performed by: NURSE PRACTITIONER

## 2025-02-24 PROCEDURE — 1160F RVW MEDS BY RX/DR IN RCRD: CPT | Performed by: NURSE PRACTITIONER

## 2025-02-24 NOTE — PROGRESS NOTES
Ladan Rinaldi is a 89 year old female.  HPI:   Patient here with daughter, following up after recent emergency room visit on February 19, 2025.  She has a history of the left sciatic pain that radiates down to the thigh around her knee.  On day of emergency room visit she noticed pain in her left calf and went to the emergency room.  She denied any redness or swelling, shortness of breath, chest pain, dizziness denied any injury or trauma.  She was noted to have elevated blood pressure in the clinic and ultrasound of the left lower extremity revealed a left calf vein DVT involving the peroneal vein she also has a Baker's cyst.  Was started on Eliquis.  She was also advised lidocaine patches for pain as needed.  Patient reports she is unable to afford further prescriptions of Eliquis, she was able to obtain the initial Rx for a total of 30 days.  She does not wish to start Coumadin, she is worried about bleeding.  She also reports she is also unable to make frequent trips to get her INR checked.  Current Outpatient Medications   Medication Sig Dispense Refill    apixaban 5 MG Oral Tab Take 2 tabs (10mg) by mouth twice daily for 7 days, then take 1 tab (5mg) by mouth twice daily thereafter. 74 tablet 0    lidocaine 5 % External Patch Place 1 patch onto the skin daily. 10 patch 0    gabapentin 100 MG Oral Cap Take 1 capsule (100 mg total) by mouth 3 (three) times daily. 90 capsule 0    clotrimazole 1 % External Solution Apply 1 Application. topically 2 (two) times daily. 60 mL 3    brimonidine Tartrate 0.2 % Ophthalmic Solution       Dorzolamide HCl-Timolol Mal 22.3-6.8 MG/ML Ophthalmic Solution INSTILL 1 DROP INTO LEFT EYE TWICE DAILY FOR 30 DAYS      aspirin 81 MG Oral Tab EC Take  by mouth. Aspir-81 81 mg tablet,delayed release      Calcium Carbonate-Vitamin D 500-200 MG-UNIT Oral Tab Take  by mouth. Calcium 500 + D 500 mg (1,250 mg)-200 unit tablet      Multiple Vitamins-Minerals (CENTRUM SILVER) Oral Tab Take   by mouth. take 1 tablet by oral route  every day      famotidine 40 MG Oral Tab Take 1 tablet (40 mg total) by mouth daily. 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    Osteoporosis    Other and unspecified hyperlipidemia      Social History:  Social History     Socioeconomic History    Marital status:    Tobacco Use    Smoking status: Never     Passive exposure: Never    Smokeless tobacco: Never   Vaping Use    Vaping status: Never Used   Substance and Sexual Activity    Alcohol use: Yes     Alcohol/week: 0.8 standard drinks of alcohol     Types: 1 Glasses of wine per week     Comment: 1 drink weekly    Drug use: No   Other Topics Concern    History of tanning No    Pt has a pacemaker No    Pt has a defibrillator No    Reaction to local anesthetic No     Comment: \"Puts me out\"    Caffeine Concern Yes     Comment: Coffee, 5 cups daily;     Left Handed No    Right Handed Yes    Currently spends a great deal of time in the sun No    Hx of Spending Great Deal of Time in Sun Yes    Bad sunburns in the past No    Tanning Salons in the Past No    Hx of Radiation Treatments No        REVIEW OF SYSTEMS:   Review of Systems   Constitutional:  Negative for activity change, appetite change, chills, diaphoresis, fatigue, fever and unexpected weight change.   HENT:  Negative for congestion.    Eyes:  Negative for visual disturbance.   Respiratory:  Negative for cough, chest tightness, shortness of breath and wheezing.    Cardiovascular:  Negative for chest pain, palpitations and leg swelling.   Gastrointestinal:  Negative for abdominal pain, constipation, diarrhea, nausea and vomiting.   Endocrine: Negative.    Genitourinary:  Negative for difficulty urinating and vaginal bleeding.   Musculoskeletal:  Positive for arthralgias. Negative for back  pain.   Skin: Negative.    Neurological:  Negative for dizziness, seizures, numbness and headaches.   Hematological: Negative.    Psychiatric/Behavioral: Negative.            EXAM:   /70 (BP Location: Right arm, Patient Position: Sitting, Cuff Size: adult)   Pulse 61   Ht 5' 2\" (1.575 m)   Wt 113 lb (51.3 kg)   BMI 20.67 kg/m²     Physical Exam  Vitals reviewed.   Constitutional:       General: She is not in acute distress.     Appearance: Normal appearance.   Eyes:      General: No scleral icterus.     Conjunctiva/sclera: Conjunctivae normal.      Pupils: Pupils are equal, round, and reactive to light.   Cardiovascular:      Rate and Rhythm: Normal rate and regular rhythm.      Pulses: Normal pulses.      Heart sounds: Normal heart sounds.   Pulmonary:      Effort: Pulmonary effort is normal. No respiratory distress.      Breath sounds: Normal breath sounds.   Skin:     General: Skin is warm.      Coloration: Skin is not jaundiced.      Findings: No bruising or erythema.   Neurological:      General: No focal deficit present.      Mental Status: She is alert and oriented to person, place, and time.      Cranial Nerves: No cranial nerve deficit.      Motor: No weakness.      Gait: Gait normal.   Psychiatric:         Thought Content: Thought content normal.         Judgment: Judgment normal.            ASSESSMENT AND PLAN:     Assessment & Plan  Acute deep vein thrombosis (DVT) of left peroneal vein (HCC)  Advised patient to continue Eliquis for now.  Assisted patient in obtaining a sooner appointment with hematology this week for February 28, 2025.  Reviewed concerning signs and symptoms and when to go to ER.  Orders:    Oncology/Hematology Referral - In Network         The patient indicates understanding of these issues and agrees to the plan.  The patient is asked to return in 2-4 weeks.     The above note was creating using Dragon speech recognition technology. Please excuse any typos.

## 2025-02-24 NOTE — TELEPHONE ENCOUNTER
REBECCA Rosario with [facility] called to schedule a new consult appt this wk    New Consult- . First Available   Referred by-Dr. Hannah Salazar  Reason- Acute deep vein thrombosis or left peroneal vein  Insurance- Medicare  Please give pt a call back. Thank you.

## 2025-02-27 ENCOUNTER — MED REC SCAN ONLY (OUTPATIENT)
Dept: PHYSICAL MEDICINE AND REHAB | Facility: CLINIC | Age: OVER 89
End: 2025-02-27

## 2025-02-28 ENCOUNTER — OFFICE VISIT (OUTPATIENT)
Age: OVER 89
End: 2025-02-28
Attending: INTERNAL MEDICINE
Payer: MEDICARE

## 2025-02-28 VITALS
HEIGHT: 62 IN | BODY MASS INDEX: 20.36 KG/M2 | OXYGEN SATURATION: 95 % | SYSTOLIC BLOOD PRESSURE: 190 MMHG | WEIGHT: 110.63 LBS | TEMPERATURE: 98 F | DIASTOLIC BLOOD PRESSURE: 79 MMHG | HEART RATE: 72 BPM | RESPIRATION RATE: 18 BRPM

## 2025-02-28 DIAGNOSIS — I82.452 ACUTE DEEP VEIN THROMBOSIS (DVT) OF LEFT PERONEAL VEIN (HCC): Primary | ICD-10-CM

## 2025-02-28 NOTE — CONSULTS
[facility] Hematology/Oncology Consultation Note    Patient Name: Ladan Rinaldi   YOB: 1930   Medical Record Number: [id number]   CSN: [id number]   Consulting Physician: Alhaji Kiran MD  Referring Physician(s): Hannah TRAN  Date of Consultation: 2/28/2025     Reason for Consultation:    1. Acute deep vein thrombosis (DVT) of left peroneal vein (HCC)      History of Present Illness:   Ladan Rinaldi is a 89 year old female seen today in the Hematology Clinic  for LLE DVT.  She had developed some sciatica with pain radiating down the left leg to the calf.  This was causing more immobility.  She then developed some pain in the left knee and presented to the emergency room    2/19/2024 x-ray of the knees showed some mild soft tissue swelling with small medial patellofemoral compartment osteophytes.  Venous Dopplers showed popliteal Baker's cyst measuring 6.6 x 1.7 x 2 cm as well as an occlusive thrombus in the left peroneal vein.    She was subsequently started on apixaban and discharged home.  She is tolerating this well and reports calf pain has improved but she continues to have some knee pain.  She has started physical therapy for her sciatica and is scheduled for an MRI of the lumbar spine next week.    At her baseline she is quite active walks about 3 miles a day and drives on her own.  She has not had any previous history of DVT/PE.    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    Osteoporosis    Other and unspecified hyperlipidemia       Past Surgical History:  Past Surgical History:   Procedure Laterality Date    Adenoidectomy      Cataract Bilateral     Colonoscopy      D & c      Tonsillectomy         Family Medical History:  Family History   Problem Relation Age of Onset    Heart Disorder Father     Diabetes Mother     Ovarian Cancer Sister          colon cancer    Colon Cancer Sister     Skin cancer Sister         unknown type    Colon Cancer Sister     Ovarian Cancer Sister     Breast Cancer Sister     Colon Cancer Other         Family H/o       Gyne History:  OB History    Para Term  AB Living   0 0 0 0 0 0   SAB IAB Ectopic Multiple Live Births   0 0 0 0 0       Social History:  Social History     Socioeconomic History    Marital status:      Spouse name: Not on file    Number of children: Not on file    Years of education: Not on file    Highest education level: Not on file   Occupational History    Not on file   Tobacco Use    Smoking status: Never     Passive exposure: Never    Smokeless tobacco: Never   Vaping Use    Vaping status: Never Used   Substance and Sexual Activity    Alcohol use: Yes     Alcohol/week: 0.8 standard drinks of alcohol     Types: 1 Glasses of wine per week     Comment: 1 drink weekly    Drug use: No    Sexual activity: Not on file   Other Topics Concern    Grew up on a farm Not Asked    History of tanning No    Outdoor occupation Not Asked    Pt has a pacemaker No    Pt has a defibrillator No    Breast feeding Not Asked    Reaction to local anesthetic No     Comment: \"Puts me out\"     Service Not Asked    Blood Transfusions Not Asked    Caffeine Concern Yes     Comment: Coffee, 5 cups daily;     Occupational Exposure Not Asked    Hobby Hazards Not Asked    Sleep Concern Not Asked    Stress Concern Not Asked    Weight Concern Not Asked    Special Diet Not Asked    Back Care Not Asked    Exercise Not Asked    Bike Helmet Not Asked    Seat Belt Not Asked    Self-Exams Not Asked    Left Handed No    Right Handed Yes    Currently spends a great deal of time in the sun No    Past Sunlamp Treatments for Acne Not Asked    Hx of Spending Great Deal of Time in Sun Yes    Bad sunburns in the past No    Tanning Salons in the Past No    Hx of Radiation Treatments No    Regular use of sun block Not Asked   Social  History Narrative    Not on file     Social Drivers of Health     Food Insecurity: Not on file   Transportation Needs: Not on file   Housing Stability: Not on file       Allergies:   Allergies[1]    Current Medications:    Current Outpatient Medications:     [START ON 3/6/2025] apixaban (ELIQUIS) 5 MG Oral Tab, Take 1 tablet (5 mg total) by mouth 2 (two) times daily., Disp: 60 tablet, Rfl: 1    lidocaine 5 % External Patch, Place 1 patch onto the skin daily., Disp: 10 patch, Rfl: 0    gabapentin 100 MG Oral Cap, Take 1 capsule (100 mg total) by mouth 3 (three) times daily., Disp: 90 capsule, Rfl: 0    clotrimazole 1 % External Solution, Apply 1 Application. topically 2 (two) times daily., Disp: 60 mL, Rfl: 3    brimonidine Tartrate 0.2 % Ophthalmic Solution, , Disp: , Rfl:     Dorzolamide HCl-Timolol Mal 22.3-6.8 MG/ML Ophthalmic Solution, INSTILL 1 DROP INTO LEFT EYE TWICE DAILY FOR 30 DAYS, Disp: , Rfl:     Calcium Carbonate-Vitamin D 500-200 MG-UNIT Oral Tab, Take  by mouth. Calcium 500 + D 500 mg (1,250 mg)-200 unit tablet, Disp: , Rfl:     Multiple Vitamins-Minerals (CENTRUM SILVER) Oral Tab, Take  by mouth. take 1 tablet by oral route  every day, Disp: , Rfl:     famotidine 40 MG Oral Tab, Take 1 tablet (40 mg total) by mouth daily. (Patient not taking: Reported on 2/28/2025), Disp: 30 tablet, Rfl: 5    Review of Systems:  Pertinent positives and negatives noted in the the HPI.     Physical Examination:  BP (!) 190/79 (BP Location: Left arm, Patient Position: Sitting, Cuff Size: adult)   Pulse 72   Temp 98 °F (36.7 °C) (Oral)   Resp 18   Ht 1.575 m (5' 2\")   Wt 50.2 kg (110 lb 9.6 oz)   SpO2 95%   BMI 20.23 kg/m²    General: Patient is alert and oriented x 3, not in acute distress.  HEENT: EOMs intact. PERRL. Oropharynx is clear.   Neck: No JVD. No palpable lymphadenopathy. Neck is supple.  Lymphatics: There is no palpable lymphadenopathy throughout in the cervical, supraclavicular, or axillary  regions.  Chest: Clear to auscultation. No wheezes or rales.  Heart: Regular rate and rhythm. S1S2 normal.  Extremities: Mild swelling around the left knee with some medial joint tenderness consistent with OA.  Homans negative.  Calf swelling has improved  Neurological: Grossly intact.       Labs:    Lab Results   Component Value Date/Time    WBC 4.6 02/23/2025 12:38 PM    RBC 3.84 02/23/2025 12:38 PM    HGB 12.8 02/23/2025 12:38 PM    HCT 37.7 02/23/2025 12:38 PM    MCV 98.2 02/23/2025 12:38 PM    MCH 33.3 02/23/2025 12:38 PM    MCHC 34.0 02/23/2025 12:38 PM    RDW 13.4 02/23/2025 12:38 PM    NEPRELIM 2.50 02/23/2025 12:38 PM    .0 02/23/2025 12:38 PM       Impression:  Diagnosis  1. Acute deep vein thrombosis (DVT) of left peroneal vein (HCC)      89-year-old lady with provoked left lower extremity DVT after relative immobilization due to sciatica.    Plan:  1.  I would recommend continuing anticoagulation with apixaban for 3 months or until fully ambulatory whichever is later  2.  Discussed the increased risk of bleeding with therapeutic anticoagulation and I have recommended she avoid NSAIDs and aspirin.  3.  Return for follow-up in 3 months  4.  She has some financial issues with the apixaban.  Co-pay assistance provided.  If this continues to be a problem will have  explore  assistance    Thank you Hannah Salazar for the opportunity to participate in the care of this interesting patient. Please do contact me if I may be of any further assistance    Alhaji Kiran MD  [facility]     Copy to Dr. Angela Dixon MD         [1] No Known Allergies

## 2025-03-03 ENCOUNTER — OFFICE VISIT (OUTPATIENT)
Dept: ENDOCRINOLOGY CLINIC | Facility: CLINIC | Age: OVER 89
End: 2025-03-03

## 2025-03-03 VITALS — SYSTOLIC BLOOD PRESSURE: 139 MMHG | HEART RATE: 88 BPM | DIASTOLIC BLOOD PRESSURE: 77 MMHG

## 2025-03-03 DIAGNOSIS — M81.0 AGE-RELATED OSTEOPOROSIS WITHOUT CURRENT PATHOLOGICAL FRACTURE: Primary | ICD-10-CM

## 2025-03-03 NOTE — PROGRESS NOTES
Name: Ladan Rinaldi  Date: 3/3/2025    Referring Physician: No ref. provider found    Chief Complaint   Patient presents with    Osteoporosis     No recent falls/fracture  No major dental work            HISTORY OF PRESENT ILLNESS   Ladan Rinaldi is a 89 year old female who presents for   Chief Complaint   Patient presents with    Osteoporosis     No recent falls/fracture  No major dental work          89 y/o F presents for follow up evaluation of osteoporosis.  She was maintained on prolia therapy with Dr. Dixon but last injection 11/2019.  Prior to prolia therapy she was maintained on Alendronate therapy.  She was tolerating prolia well without any side effects.  No h/o falls or fractures.  No h/o nephrolithiasis.    Per review of record she was started on prolia therapy in 2015.      However she was lost to follow up in 2023 -->missed two prolia injections.  Since that time she restarted prolia in 2/2024 and received second injection 8/2024.  No falls or fracture in the past few years.  She does have current LLE DVT and started on Eliquis therapy.     She is tolerating prolia well.      She is maintained on Calcium 500mg 500mg PO daily and Vitamin D 400 units daily.     Maternal h/o osteoporosis      REVIEW OF SYSTEMS  Eyes: no change in vision  Neurologic: no headache, generalized or focal weakness or numbness.  Head: normal  ENT: normal  Lungs: no shortness of breath, wheezing or FARMER  Cardiovascular:  no chest pain or palpitations  Gastrointestinal:  no abdominal pain, bowel movement problems  Musculoskeletal: no muscle pain or arthralgia  /Gyne: no frequency or discomfort while urinating  Psychiatric:  no acute distress, anxiety  or depression  Skin: normal moisturized skin    Medications:     Current Outpatient Medications:     [START ON 3/6/2025] apixaban (ELIQUIS) 5 MG Oral Tab, Take 1 tablet (5 mg total) by mouth 2 (two) times daily., Disp: 60 tablet, Rfl: 1    lidocaine 5 % External Patch, Place 1  patch onto the skin daily., Disp: 10 patch, Rfl: 0    gabapentin 100 MG Oral Cap, Take 1 capsule (100 mg total) by mouth 3 (three) times daily., Disp: 90 capsule, Rfl: 0    famotidine 40 MG Oral Tab, Take 1 tablet (40 mg total) by mouth daily., Disp: 30 tablet, Rfl: 5    clotrimazole 1 % External Solution, Apply 1 Application. topically 2 (two) times daily., Disp: 60 mL, Rfl: 3    brimonidine Tartrate 0.2 % Ophthalmic Solution, , Disp: , Rfl:     Dorzolamide HCl-Timolol Mal 22.3-6.8 MG/ML Ophthalmic Solution, INSTILL 1 DROP INTO LEFT EYE TWICE DAILY FOR 30 DAYS, Disp: , Rfl:     Calcium Carbonate-Vitamin D 500-200 MG-UNIT Oral Tab, Take  by mouth. Calcium 500 + D 500 mg (1,250 mg)-200 unit tablet, Disp: , Rfl:     Multiple Vitamins-Minerals (CENTRUM SILVER) Oral Tab, Take  by mouth. take 1 tablet by oral route  every day, Disp: , Rfl:      Allergies:   No Known Allergies    Social History:   Social History     Socioeconomic History    Marital status:    Tobacco Use    Smoking status: Never     Passive exposure: Never    Smokeless tobacco: Never   Vaping Use    Vaping status: Never Used   Substance and Sexual Activity    Alcohol use: Yes     Alcohol/week: 0.8 standard drinks of alcohol     Types: 1 Glasses of wine per week     Comment: 1 drink weekly    Drug use: No   Other Topics Concern    History of tanning No    Pt has a pacemaker No    Pt has a defibrillator No    Reaction to local anesthetic No     Comment: \"Puts me out\"    Caffeine Concern Yes     Comment: Coffee, 5 cups daily;     Left Handed No    Right Handed Yes    Currently spends a great deal of time in the sun No    Hx of Spending Great Deal of Time in Sun Yes    Bad sunburns in the past No    Tanning Salons in the Past No    Hx of Radiation Treatments No       Medical History:   Past Medical History:    Atypical nevus    skin of right cheek    Cancer (HCC)    Melanoma in situ of cheek (HCC)    \"Malignant melanoma in situ - Right cheek     Osteoporosis    Other and unspecified hyperlipidemia       Surgical history:   Past Surgical History:   Procedure Laterality Date    Adenoidectomy      Cataract Bilateral     Colonoscopy      D & c      Tonsillectomy         PHYSICAL EXAMINATION:  /77   Pulse 88     General Appearance:  Alert, in no acute distress, well developed  Eyes: normal conjunctivae, sclera.  Ears/Nose/Mouth/Throat/Neck:  normal hearing, normal speech and diffusely enlarged thyroid gland  Neck: Trachea midline  Neurologic: sensory grossly intact and motor grossly intact  Musculoskeletal:  normal muscle strength and tone  PV: normal pulses of carotids, pedals  Skin:  normal moisture and skin texture  Hair & Nails:  normal scalp hair     Neuro:  sensory grossly intact and motor grossly intact  Psychiatric:  oriented to time, self, and place  Nutritional:  no abnormal weight gain or loss    ASSESSMENT/PLAN:    1. Osteoporosis  - Discussed diagnosis with patient  - Given elderly age and persistent osteoporosis would recommend continue therapy  - She agrees to continue prolia therapy - injection given today   - Would continue prolia indefinitely at this time   - Continue calcium and Vitamin D   - BP elevated, improved on repeat  - No need to repeat DEXA given no change in therapy plan     RTC 1 year     3/3/2025  Bailey Ascencio MD

## 2025-03-12 ENCOUNTER — HOSPITAL ENCOUNTER (OUTPATIENT)
Dept: MRI IMAGING | Facility: HOSPITAL | Age: OVER 89
Discharge: HOME | End: 2025-03-12
Attending: PHYSICAL MEDICINE & REHABILITATION
Payer: MEDICARE

## 2025-03-12 DIAGNOSIS — M79.10 MYALGIA: ICD-10-CM

## 2025-03-12 DIAGNOSIS — M99.9 BIOMECHANICAL LESION: ICD-10-CM

## 2025-03-12 DIAGNOSIS — M54.59 MECHANICAL LOW BACK PAIN: ICD-10-CM

## 2025-03-12 DIAGNOSIS — M51.369 BULGE OF LUMBAR DISC WITHOUT MYELOPATHY: ICD-10-CM

## 2025-03-12 DIAGNOSIS — M47.816 LUMBAR SPONDYLOSIS: ICD-10-CM

## 2025-03-12 DIAGNOSIS — M48.061 LUMBAR FORAMINAL STENOSIS: ICD-10-CM

## 2025-03-12 DIAGNOSIS — M47.816 FACET SYNDROME, LUMBAR: ICD-10-CM

## 2025-03-12 DIAGNOSIS — M51.372 DEGENERATION OF INTERVERTEBRAL DISC OF LUMBOSACRAL REGION WITH DISCOGENIC BACK PAIN AND LOWER EXTREMITY PAIN: ICD-10-CM

## 2025-03-12 DIAGNOSIS — M54.16 LUMBAR RADICULOPATHY: ICD-10-CM

## 2025-03-12 PROCEDURE — 72148 MRI LUMBAR SPINE W/O DYE: CPT | Performed by: PHYSICAL MEDICINE & REHABILITATION

## 2025-03-28 DIAGNOSIS — M48.061 LUMBAR FORAMINAL STENOSIS: ICD-10-CM

## 2025-03-28 DIAGNOSIS — M99.9 BIOMECHANICAL LESION: ICD-10-CM

## 2025-03-28 DIAGNOSIS — M47.816 LUMBAR SPONDYLOSIS: ICD-10-CM

## 2025-03-28 DIAGNOSIS — M47.816 FACET SYNDROME, LUMBAR: ICD-10-CM

## 2025-03-28 DIAGNOSIS — M51.372 DEGENERATION OF INTERVERTEBRAL DISC OF LUMBOSACRAL REGION WITH DISCOGENIC BACK PAIN AND LOWER EXTREMITY PAIN: ICD-10-CM

## 2025-03-28 DIAGNOSIS — M54.59 MECHANICAL LOW BACK PAIN: ICD-10-CM

## 2025-03-28 DIAGNOSIS — M51.369 BULGE OF LUMBAR DISC WITHOUT MYELOPATHY: ICD-10-CM

## 2025-03-28 DIAGNOSIS — M54.16 LUMBAR RADICULOPATHY: ICD-10-CM

## 2025-03-28 DIAGNOSIS — M79.10 MYALGIA: ICD-10-CM

## 2025-03-28 RX ORDER — GABAPENTIN 100 MG/1
100 CAPSULE ORAL 3 TIMES DAILY
Qty: 90 CAPSULE | Refills: 0 | Status: SHIPPED | OUTPATIENT
Start: 2025-03-28 | End: 2025-03-31

## 2025-03-28 NOTE — TELEPHONE ENCOUNTER
Refill Request    Medication request: gabapentin 100 MG Oral Cap.  Take 1 capsule (100 mg total) by mouth 3 (three) times daily.      LOV:2/12/2025 Behar, Alex, MD   Due back to clinic per last office note:  Follow-up with me in 6 to 8 weeks after you begin physical therapy.   NOV: 4/3/2025 Behar, Alex, [place]      [place]: 2/12/25 #90 (30 days)    Urine drug screen (if applicable): N/A  Pain contract: N/A    LOV plan (if weaning or changing medications): Start gabapentin 100 mg three times per day.Start with 1 tablet at night, and if no side effects, then can take twice per day

## 2025-03-31 RX ORDER — GABAPENTIN 100 MG/1
100 CAPSULE ORAL 3 TIMES DAILY
Qty: 90 CAPSULE | Refills: 0 | Status: SHIPPED | OUTPATIENT
Start: 2025-03-31

## 2025-03-31 NOTE — TELEPHONE ENCOUNTER
Refill Request    Medication request: GABAPENTIN 100 MG Oral Cap    LOV:2/12/2025 Behar, Alex, MD   Due back to clinic per last office note:  She will follow up with me to review the MRI and discuss next steps.   NOV: 4/3/2025 Behar, Alex, MD      [place]: 02/12/2025 #90    Urine drug screen (if applicable): N/A  Pain contract: N/A    LOV plan (if weaning or changing medications):  Start gabapentin 100 mg three times per day.Start with 1 tablet at night, and if no side effects, then can take twice per day

## 2025-04-03 ENCOUNTER — OFFICE VISIT (OUTPATIENT)
Dept: PHYSICAL MEDICINE AND REHAB | Facility: CLINIC | Age: OVER 89
End: 2025-04-03
Payer: MEDICARE

## 2025-04-03 VITALS
DIASTOLIC BLOOD PRESSURE: 84 MMHG | HEIGHT: 62 IN | SYSTOLIC BLOOD PRESSURE: 140 MMHG | BODY MASS INDEX: 20.24 KG/M2 | WEIGHT: 110 LBS

## 2025-04-03 DIAGNOSIS — M54.16 LUMBAR RADICULOPATHY: ICD-10-CM

## 2025-04-03 DIAGNOSIS — M47.816 FACET SYNDROME, LUMBAR: ICD-10-CM

## 2025-04-03 DIAGNOSIS — M51.372 DEGENERATION OF INTERVERTEBRAL DISC OF LUMBOSACRAL REGION WITH DISCOGENIC BACK PAIN AND LOWER EXTREMITY PAIN: ICD-10-CM

## 2025-04-03 DIAGNOSIS — M79.10 MYALGIA: ICD-10-CM

## 2025-04-03 DIAGNOSIS — M99.9 BIOMECHANICAL LESION: ICD-10-CM

## 2025-04-03 DIAGNOSIS — M51.369 BULGE OF LUMBAR DISC WITHOUT MYELOPATHY: ICD-10-CM

## 2025-04-03 DIAGNOSIS — M48.061 LUMBAR FORAMINAL STENOSIS: ICD-10-CM

## 2025-04-03 DIAGNOSIS — M47.816 LUMBAR SPONDYLOSIS: Primary | ICD-10-CM

## 2025-04-03 DIAGNOSIS — M54.59 MECHANICAL LOW BACK PAIN: ICD-10-CM

## 2025-04-03 PROCEDURE — 1159F MED LIST DOCD IN RCRD: CPT | Performed by: PHYSICAL MEDICINE & REHABILITATION

## 2025-04-03 PROCEDURE — 1125F AMNT PAIN NOTED PAIN PRSNT: CPT | Performed by: PHYSICAL MEDICINE & REHABILITATION

## 2025-04-03 PROCEDURE — 1160F RVW MEDS BY RX/DR IN RCRD: CPT | Performed by: PHYSICAL MEDICINE & REHABILITATION

## 2025-04-03 PROCEDURE — 99214 OFFICE O/P EST MOD 30 MIN: CPT | Performed by: PHYSICAL MEDICINE & REHABILITATION

## 2025-04-03 RX ORDER — GABAPENTIN 300 MG/1
300 CAPSULE ORAL 2 TIMES DAILY
Qty: 60 CAPSULE | Refills: 2 | Status: SHIPPED | OUTPATIENT
Start: 2025-04-03

## 2025-04-03 NOTE — PROGRESS NOTES
The following individual(s) verbally consented to be recorded using ambient AI listening technology and understand that they can each withdraw their consent to this listening technology at any point by asking the clinician to turn off or pause the recording:    Patient name: Ladan Rinaldi  Additional names:

## 2025-04-03 NOTE — PATIENT INSTRUCTIONS
1) My office will call you to schedule the LEFT L4 and LEFT L5 TFESI under local anesthesia once the procedure is approved by your insurance carrier.    2) Follow up with me 2-4 weeks after the procedure. This can be in the office or virtually.   3) Tylenol 500-1000 mg every 6-8 hours as needed for pain.  No more than 3000 mg daily.  4) I have increased the gabapentin to 300 mg twice per day

## 2025-04-03 NOTE — PROGRESS NOTES
[facility]  Progress Note    CHIEF COMPLAINT:    Chief Complaint   Patient presents with    Follow - Up     LOV 2/12/25. F/U to review MRI L spine 3/12/25. Pain 8/10 in Low back, L leg. Denies N/T. Pt takes gabapentin 2x daily, tylenol daily with improvement. Pt is in PT with improvement.       History of Present Illness  Ladan Rinaldi is a 89 year old female with lumbar spinal stenosis and spondylolisthesis who presents with persistent back and leg pain.     She experiences persistent pain in her back and left leg, radiating from the back into the buttock, outside of the left thigh, and down to the front of the leg, crossing the knee but not reaching the ankle (in an L4 distribution). The pain is described as sharp and shooting, particularly in the calf, and is rated as an 8 out of 10 in severity. It is intermittent and not clearly exacerbated by specific activities such as standing, walking, or sitting.    She has been undergoing physical therapy and feels better after sessions, although the pain persists. She is performing exercises at home and has several therapy sessions scheduled through the end of April.    Her current medications include extra strength Tylenol, taken twice a day, and gabapentin, initially at a dose of 100 mg twice a day. The third dose of gabapentin was bothersome, so she takes it only twice daily.    She has a history of a blood clot and is currently on Eliquis, which she has been taking for two months, with a plan to continue for a total of three months as advised by her hematologist.       PAST MEDICAL HISTORY:  Past Medical History:    Atypical nevus    skin of right cheek    Cancer (HCC)    Melanoma in situ of cheek (HCC)    \"Malignant melanoma in situ - Right cheek    Osteoporosis    Other and unspecified hyperlipidemia       SURGICAL HISTORY:  Past Surgical History:   Procedure Laterality Date    Adenoidectomy      Cataract Bilateral      Colonoscopy      D & c      Tonsillectomy         SOCIAL HISTORY:   Social History     Occupational History    Not on file   Tobacco Use    Smoking status: Never     Passive exposure: Never    Smokeless tobacco: Never   Vaping Use    Vaping status: Never Used   Substance and Sexual Activity    Alcohol use: Yes     Alcohol/week: 0.8 standard drinks of alcohol     Types: 1 Glasses of wine per week     Comment: 1 drink weekly    Drug use: No    Sexual activity: Not on file       FAMILY HISTORY:   Family History   Problem Relation Age of Onset    Heart Disorder Father     Diabetes Mother     Ovarian Cancer Sister         colon cancer    Colon Cancer Sister     Skin cancer Sister         unknown type    Colon Cancer Sister     Ovarian Cancer Sister     Breast Cancer Sister     Colon Cancer Other         Family H/o       CURRENT MEDICATIONS:   Current Outpatient Medications   Medication Sig Dispense Refill    gabapentin 300 MG Oral Cap Take 1 capsule (300 mg total) by mouth 2 (two) times daily. 60 capsule 2    apixaban (ELIQUIS) 5 MG Oral Tab Take 1 tablet (5 mg total) by mouth 2 (two) times daily. 60 tablet 1    lidocaine 5 % External Patch Place 1 patch onto the skin daily. 10 patch 0    famotidine 40 MG Oral Tab Take 1 tablet (40 mg total) by mouth daily. 30 tablet 5    clotrimazole 1 % External Solution Apply 1 Application. topically 2 (two) times daily. 60 mL 3    brimonidine Tartrate 0.2 % Ophthalmic Solution       Dorzolamide HCl-Timolol Mal 22.3-6.8 MG/ML Ophthalmic Solution INSTILL 1 DROP INTO LEFT EYE TWICE DAILY FOR 30 DAYS      Calcium Carbonate-Vitamin D 500-200 MG-UNIT Oral Tab Take  by mouth. Calcium 500 + D 500 mg (1,250 mg)-200 unit tablet      Multiple Vitamins-Minerals (CENTRUM SILVER) Oral Tab Take  by mouth. take 1 tablet by oral route  every day         ALLERGIES:   Allergies[1]    REVIEW OF SYSTEMS:   Review of Systems   Constitutional: Negative.    HENT: Negative.    Eyes: Negative.     Respiratory: Negative.    Cardiovascular: Negative.    Gastrointestinal: Negative.    Genitourinary: Negative.    Musculoskeletal: As per HPI  Skin: Negative.    Neurological: As per HPI  Endo/Heme/Allergies: Negative.    Psychiatric/Behavioral: Negative.      All other systems reviewed and are negative. Pertinent positives and negatives noted in the HPI.    PHYSICAL EXAM:   /84   Ht 62\"   Wt 110 lb (49.9 kg)   BMI 20.12 kg/m²     Body mass index is 20.12 kg/m².      General: No immediate distress  Head: Normocephalic/ Atraumatic  Eyes: Extra-occular movements intact.   Ears: No auricular hematoma or deformities  Mouth: No lesions or ulcerations  Heart: peripheral pulses intact. Normal capillary refill.   Lungs: Non-labored respirations  Abdomen: No abdominal guarding  Extremities: No lower extremity edema bilaterally   Skin: No lesions noted.   Cognition: alert & oriented x 3, attentive, able to follow 2 step commands, comprehension intact, spontaneous speech intact  Motor:    Musculoskeletal:        Data  [facility] Lab Encounter on 02/23/2025   Component Date Value Ref Range Status    WBC 02/23/2025 4.6  4.0 - 11.0 x10(3) uL Final    RBC 02/23/2025 3.84  3.80 - 5.30 x10(6)uL Final    HGB 02/23/2025 12.8  12.0 - 16.0 g/dL Final    HCT 02/23/2025 37.7  35.0 - 48.0 % Final    MCV 02/23/2025 98.2  80.0 - 100.0 fL Final    MCH 02/23/2025 33.3  26.0 - 34.0 pg Final    MCHC 02/23/2025 34.0  31.0 - 37.0 g/dL Final    RDW-SD 02/23/2025 47.7 (H)  35.1 - 46.3 fL Final    RDW 02/23/2025 13.4  11.0 - 15.0 % Final    PLT 02/23/2025 177.0  150.0 - 450.0 10(3)uL Final    Neutrophil Absolute Prelim 02/23/2025 2.50  1.50 - 7.70 x10 (3) uL Final    Neutrophil Absolute 02/23/2025 2.50  1.50 - 7.70 x10(3) uL Final    Lymphocyte Absolute 02/23/2025 1.27  1.00 - 4.00 x10(3) uL Final    Monocyte Absolute 02/23/2025 0.63  0.10 - 1.00 x10(3) uL Final    Eosinophil Absolute 02/23/2025 0.13  0.00 - 0.70 x10(3) uL Final    Basophil  Absolute 02/23/2025 0.03  0.00 - 0.20 x10(3) uL Final    Immature Granulocyte Absolute 02/23/2025 0.01  0.00 - 1.00 x10(3) uL Final    Neutrophil % 02/23/2025 54.7  % Final    Lymphocyte % 02/23/2025 27.8  % Final    Monocyte % 02/23/2025 13.8  % Final    Eosinophil % 02/23/2025 2.8  % Final    Basophil % 02/23/2025 0.7  % Final    Immature Granulocyte % 02/23/2025 0.2  % Final    Glucose 02/23/2025 116 (H)  70 - 99 mg/dL Final    Sodium 02/23/2025 139  136 - 145 mmol/L Final    Potassium 02/23/2025 3.9  3.5 - 5.1 mmol/L Final    Chloride 02/23/2025 103  98 - 112 mmol/L Final    CO2 02/23/2025 30.0  21.0 - 32.0 mmol/L Final    Anion Gap 02/23/2025 6  0 - 18 mmol/L Final    BUN 02/23/2025 17  9 - 23 mg/dL Final    Creatinine 02/23/2025 0.66  0.55 - 1.02 mg/dL Final    BUN/CREA Ratio 02/23/2025 25.8 (H)  10.0 - 20.0 Final    Calcium, Total 02/23/2025 9.0  8.7 - 10.4 mg/dL Final    Calculated Osmolality 02/23/2025 291  275 - 295 mOsm/kg Final    eGFR-Cr 02/23/2025 81  >=60 mL/min/1.73m2 Final    ALT 02/23/2025 35  10 - 49 U/L Final    AST 02/23/2025 29  <34 U/L Final    Alkaline Phosphatase 02/23/2025 66  55 - 142 U/L Final    Bilirubin, Total 02/23/2025 0.7  0.2 - 0.9 mg/dL Final    Total Protein 02/23/2025 6.4  5.7 - 8.2 g/dL Final    Albumin 02/23/2025 4.2  3.2 - 4.8 g/dL Final    Globulin  02/23/2025 2.2  2.0 - 3.5 g/dL Final    A/G Ratio 02/23/2025 1.9  1.0 - 2.0 Final    Patient Fasting for CMP? 02/23/2025 No   Final   ]      Radiology Imaging:  I reviewed with the patient her MRI of the lumbar spine   MRI SPINE LUMBAR (CPT=72148)  Narrative: PROCEDURE: MRI SPINE LUMBAR (CPT=72148)     COMPARISON: [facility], XR LUMBAR SPINE (MIN 4 VIEWS) (CPT=72110), 2/10/2025, 3:29 PM.     INDICATIONS: M99.9 Biomechanical lesion M54.59 Mechanical low back pain M47.816 Facet syndrome, lumbar M79.10 Myalgia M54.16 Lumbar radiculopathy M51.369 Bulge of lumbar di*     TECHNIQUE: A variety of imaging planes  and parameters were utilized for visualization of suspected pathology.     FINDINGS:   NUMERATION: For the purposes of this examination, the lowest fully formed disc space is designated as L5-S1.  ALIGNMENT: There is preservation of the expected lumbar lordosis.  Trace degenerative anterolisthesis of L4 relative to L5.  BONES: No acute lumbar spine fracture.  Probable benign hemangioma with atypical features/incomplete suppression on STIR in the right posterior L1 vertebrae.  CORD/CAUDA EQUINA: The distal cord and nerve roots have normal caliber, contour, and signal intensity.  PARASPINAL AREA: Incidental S2 sacral Tarlov cyst.  OTHER: Small 6 mm well-circumscribed T2 bright focus in the posterior right hepatic lobe, which statistically relates to a benign cyst or hemangioma.  There is also a simple-appearing right lower pole renal cyst.  Probable small 0.9 cm left ovarian cyst   at the edge of the field of view.  Given small size, this finding is of questionable clinical significance.     LUMBAR DISC LEVELS:  L1-L2: Small diffuse disc bulge.  No significant spinal canal or neural foraminal stenosis.  L2-L3: Mild disc desiccation and degeneration with slight bilateral facet arthropathy.  No significant spinal canal or neural foraminal stenosis.  L3-L4: Small disc bulge with slight ligamentum flavum redundancy, mild dorsal epidural lipomatosis, and mild bilateral facet arthropathy.  No significant spinal canal or neural foraminal stenosis.  L4-L5: Diffuse disc bulge with superimposed left foraminal zone disc protrusion in addition to left greater than right facet arthropathy, ligamentum flavum redundancy, and mild dorsal epidural lipomatosis.  Severe left and moderate right neural foraminal   stenosis with no substantial spinal canal or lateral recess compromise.  L5-S1: Disc and facet related degeneration.  There is also a 7 mm posteriorly directed synovial cyst that arises from the left facet joint, but does not  extend toward the thecal sac.  No significant spinal canal or lateral recess stenosis.  Minor right   greater than left neural foraminal stenosis.              Impression: CONCLUSION:      1. Multilevel degenerative changes of the lumbar spine as detailed. Notable levels as follows:     2. L4-L5:  Severe left and moderate right neural foraminal stenosis.  3. L5-S1:  Subcentimeter posteriorly directed synovial cyst that arises from the left facet joint, but does not extend toward the thecal sac.  No significant spinal canal or neural foraminal stenosis.     4. Trace degenerative anterolisthesis of L4 relative to L5.     5. Probable benign hemangioma with atypical features in the L1 vertebral body.     6. Probable small hepatic and right cysts.     7. Lesser incidental findings as above.        elm-remote     Dictated by (CST): Miguel Zapata MD on 3/17/2025 at 3:22 PM       Finalized by (CST): Miguel Zapata MD on 3/17/2025 at 3:28 PM              ASSESSMENT AND PLAN:  Ladan is a pleasant 89-year-old female presents with left-sided low back pain with radiation down the left leg which I believe is due to an L4 and L5 lumbar radiculopathy.  I have reviewed her MRI of the lumbar spine which she does have severe foraminal stenosis at L4-L5 with a spondylolisthesis.  I am recommending a left L4 and left L5 transforaminal epidural steroid injection under local anesthesia.  She should increase her gabapentin to 300 mg twice per day and continue physical therapy as well as a home exercise program.  She will follow-up with me 2 to 4 weeks after the procedure.     Assessment & Plan  Lumbar foraminal stenosis with radiculopathy  Stenosis at L4-L5 with spondylolisthesis and facet joint arthritis causing nerve compression, affecting L4 and L5 nerve roots. Persistent pain despite physical therapy.  - Administer left L4 and L5 transforaminal epidural steroid injection under local anesthesia.  - Continue physical therapy sessions  through the end of April.  - Increase gabapentin to 300 mg twice daily.    Blood clot management  On Eliquis for a blood clot with a three-month treatment plan, currently two months in.  - Consult with primary care physician or hematologist regarding the status of the blood clot and continuation of Eliquis.      RTC in 2 to 4 weeks after procedure  Discharge Instructions were provided as documented in AVS summary.  The patient was in agreement with the assessment and plan.  All questions were answered.  There were no barriers to learning.         1. Lumbar spondylosis    2. Degeneration of intervertebral disc of lumbosacral region with discogenic back pain and lower extremity pain    3. Lumbar foraminal stenosis    4. Bulge of lumbar disc without myelopathy    5. Lumbar radiculopathy    6. Myalgia    7. Facet syndrome, lumbar    8. Mechanical low back pain    9. Biomechanical lesion        Alex B. Behar MD  Physical Medicine and Rehabilitation/Sports Medicine  [facility]  Experts 911 Cures Act Notice to Patient: Medical documents like this are made available to patients in the interest of transparency. However, be advised this is a medical document and it is intended as [id number] communication between your medical providers. This medical document may contain abbreviations, assessments, medical data, and results or other terms that are unfamiliar. Medical documents are intended to carry relevant information, facts as evident, and the clinical opinion of the practitioner. As such, this medical document may be written in language that appears blunt or direct. You are encouraged to contact your medical provider and/or [facility] Experience if you have any questions about this medical document.   Abridge tool was used for dictation purposes only and the patient was not recorded at any point during the visit.              [1] No Known Allergies

## 2025-04-07 ENCOUNTER — TELEPHONE (OUTPATIENT)
Dept: PHYSICAL MEDICINE AND REHAB | Facility: CLINIC | Age: OVER 89
End: 2025-04-07

## 2025-04-07 DIAGNOSIS — M47.816 LUMBAR SPONDYLOSIS: Primary | ICD-10-CM

## 2025-04-07 NOTE — TELEPHONE ENCOUNTER
Initiated authorization for LEFT L4 and LEFT L5 TFESI. CPT/HCPCS 27954, 98984 dx:M54.16 to be done at [facility] with [employer] portal.    Status: Approved  Reference/Authorization # [id number]  Valid: 4/7/25-10/1/25  Authorization is not a guarantee of payment and may be subject to review once claim is submitted.

## 2025-04-07 NOTE — TELEPHONE ENCOUNTER
Per Spinal Intervention Reference:  No restrictions for any transforaminal epidural steroid injection with levels of L1-L5.  Patient has been scheduled for Left L4 and L5 transforaminal epidural steroid injection on 4/16/2025 at the [facility] with Dr. Behar.   Anesthesia type:  local  Please note: The [facility] will call the business day prior to discuss the exact time/arrival and additional instructions for your appointment.  Patient was advised that if he/she does receive the covid vaccine it needs to be at least 2 weeks before or after the injection.  Medications and allergies reviewed.  Patient informed of [facility]'s  policy:  The patient will require transportation arrangements to and from the procedure, with the  present on site for the entire visit.  Without a , the appointment is subject to cancellation.    [facility] is located in the [facility] 1st floor [address] [place] [address].   may park in the yellow/purple parking lot.  Patient verbalized understanding and agrees with plan.  Scheduled in Epic: Yes  Scheduled in Surgical Case: Yes  Follow up appointment made: NOV: Visit date not found  Authorization date valid until 10/1/2025 at [facility].

## 2025-04-11 ENCOUNTER — TELEPHONE (OUTPATIENT)
Dept: PHYSICAL MEDICINE AND REHAB | Facility: CLINIC | Age: OVER 89
End: 2025-04-11

## 2025-04-11 DIAGNOSIS — M54.59 MECHANICAL LOW BACK PAIN: ICD-10-CM

## 2025-04-11 DIAGNOSIS — M47.816 FACET SYNDROME, LUMBAR: ICD-10-CM

## 2025-04-11 DIAGNOSIS — M48.061 LUMBAR FORAMINAL STENOSIS: ICD-10-CM

## 2025-04-11 DIAGNOSIS — M47.816 LUMBAR SPONDYLOSIS: Primary | ICD-10-CM

## 2025-04-11 DIAGNOSIS — M54.16 LUMBAR RADICULOPATHY: ICD-10-CM

## 2025-04-11 DIAGNOSIS — M99.9 BIOMECHANICAL LESION: ICD-10-CM

## 2025-04-11 DIAGNOSIS — M51.372 DEGENERATION OF INTERVERTEBRAL DISC OF LUMBOSACRAL REGION WITH DISCOGENIC BACK PAIN AND LOWER EXTREMITY PAIN: ICD-10-CM

## 2025-04-11 DIAGNOSIS — M51.369 BULGE OF LUMBAR DISC WITHOUT MYELOPATHY: ICD-10-CM

## 2025-04-11 DIAGNOSIS — M79.10 MYALGIA: ICD-10-CM

## 2025-04-11 NOTE — TELEPHONE ENCOUNTER
[facility] calling to request revised PT order to reflect new effective date as they do not accept current order dated 2/12/25. This can be fax to [phone].

## 2025-04-14 NOTE — TELEPHONE ENCOUNTER
Per office visit note on 4/3/25 \"Continue physical therapy sessions through the end of April. \"    Order placed again as per written order since [facility] did not accept order from 2/12/25, and faxed at [facility] at fax# [phone]

## 2025-04-16 PROBLEM — M54.16 LUMBAR RADICULOPATHY: Status: ACTIVE | Noted: 2025-04-16

## 2025-04-16 PROBLEM — M48.061 LUMBAR FORAMINAL STENOSIS: Status: ACTIVE | Noted: 2025-04-16

## 2025-04-16 PROBLEM — M51.369 BULGE OF LUMBAR DISC WITHOUT MYELOPATHY: Status: ACTIVE | Noted: 2025-04-16

## 2025-04-17 ENCOUNTER — NURSE TRIAGE (OUTPATIENT)
Dept: INTERNAL MEDICINE CLINIC | Facility: CLINIC | Age: OVER 89
End: 2025-04-17

## 2025-04-17 NOTE — TELEPHONE ENCOUNTER
PA will call patient and offer him appointment either tomorrow 8:20 AM or Saturday morning at 8:40 AM

## 2025-04-17 NOTE — TELEPHONE ENCOUNTER
Action Requested: Summary for Provider     []  Critical Lab, Recommendations Needed  [x] Need Additional Advice  []   FYI    []   Need Orders  [] Need Medications Sent to Pharmacy  []  Other     SUMMARY: Per Protocol disposition advised to be seen in the office. Patient only wants to see PCP but there are no appts open. Message sent to PCP. Patient denies any symptoms at this time.      Patient was instructed that if symptoms worsen to be seen in the ER/IC for evaluation. Verbalized understanding.     Reason for call: Acute  Onset: yesterday    Yesterday at Dr Behar office for a procedure, blood pressure readings 215/88, 179/78-patient states that she was very anxious. Patient does not take any blood pressure medication.  129/68 today-patient took her B/P at home. Patient is requesting an appt with PCP.  Reason for Disposition   Systolic BP >= 130 OR Diastolic >= 80, and pregnant    Protocols used: Blood Pressure - High-A-OH

## 2025-04-21 ENCOUNTER — OFFICE VISIT (OUTPATIENT)
Dept: INTERNAL MEDICINE CLINIC | Facility: CLINIC | Age: OVER 89
End: 2025-04-21

## 2025-04-21 VITALS
BODY MASS INDEX: 20.26 KG/M2 | WEIGHT: 110.13 LBS | SYSTOLIC BLOOD PRESSURE: 172 MMHG | HEART RATE: 73 BPM | DIASTOLIC BLOOD PRESSURE: 80 MMHG | HEIGHT: 62 IN

## 2025-04-21 DIAGNOSIS — I82.452 ACUTE DEEP VEIN THROMBOSIS (DVT) OF LEFT PERONEAL VEIN (HCC): ICD-10-CM

## 2025-04-21 DIAGNOSIS — I10 PRIMARY HYPERTENSION: Primary | ICD-10-CM

## 2025-04-21 PROCEDURE — 1159F MED LIST DOCD IN RCRD: CPT | Performed by: INTERNAL MEDICINE

## 2025-04-21 PROCEDURE — 1126F AMNT PAIN NOTED NONE PRSNT: CPT | Performed by: INTERNAL MEDICINE

## 2025-04-21 PROCEDURE — 99214 OFFICE O/P EST MOD 30 MIN: CPT | Performed by: INTERNAL MEDICINE

## 2025-04-21 RX ORDER — LEVOCETIRIZINE DIHYDROCHLORIDE 5 MG/1
1 TABLET, FILM COATED ORAL EVERY EVENING
COMMUNITY

## 2025-04-21 RX ORDER — PANTOPRAZOLE SODIUM 40 MG/1
1 TABLET, DELAYED RELEASE ORAL
COMMUNITY

## 2025-04-21 RX ORDER — PREDNISONE 20 MG/1
1 TABLET ORAL 2 TIMES DAILY
COMMUNITY

## 2025-04-21 RX ORDER — AMLODIPINE BESYLATE 2.5 MG/1
2.5 TABLET ORAL DAILY
Qty: 90 TABLET | Refills: 1 | Status: SHIPPED | OUTPATIENT
Start: 2025-04-21

## 2025-04-21 RX ORDER — CEPHALEXIN 500 MG/1
1 CAPSULE ORAL 2 TIMES DAILY
COMMUNITY

## 2025-04-21 RX ORDER — VITAMIN B COMPLEX
TABLET ORAL AS DIRECTED
COMMUNITY

## 2025-04-21 NOTE — PROGRESS NOTES
Subjective:     Patient ID: Ladan Rinaldi is a 89 year old female.  Presents for evaluation of elevated blood pressure.    HPI  Patient is here accompanied by her niece.  She received lumbar epidural injection April 16, 2025 by Dr. Behar.  Pressure was high prior to procedure but she was able to get through the procedure and after that blood pressure is running high she feels pressure in her neck.  No chest pain or dizziness.  She has been taking Eliquis to treat left peroneal thrombosis.  3 months will be due in May, patient reports no pain in the leg, leg edema decreased according to the niece.  Venous Doppler showed Baker's cyst as well.    Patient reports that epidural injection helped she has less pain in the left leg.  She has been taking gabapentin 300 mg twice a day feeling slightly dizzy wants to get off medication if not necessary to continue.  Patient seen in the presence of her friend who drove patient to the office    Current Medications[1]  Allergies:Allergies[2]    Past Medical History[3]   Past Surgical History[4]   Family History[5]   Social History: Short Social Hx on File[6]     BP (!) 172/80   Pulse 73   Ht 5' 2\" (1.575 m)   Wt 110 lb 2 oz (50 kg)   BMI 20.14 kg/m²    Physical Exam  Constitutional:       Appearance: Normal appearance.   HENT:      Head: Normocephalic and atraumatic.   Cardiovascular:      Rate and Rhythm: Normal rate and regular rhythm.      Heart sounds: No murmur heard.     No gallop.   Pulmonary:      Effort: Pulmonary effort is normal. No respiratory distress.      Breath sounds: No wheezing or rhonchi.   Musculoskeletal:         General: Normal range of motion.      Cervical back: Normal range of motion and neck supple.      Right lower leg: No edema.      Left lower leg: Edema (Trace edema left lower leg below knee) present.   Lymphadenopathy:      Cervical: No cervical adenopathy.   Skin:     General: Skin is warm.   Neurological:      General: No focal deficit  present.      Mental Status: She is alert and oriented to person, place, and time. Mental status is at baseline.      Cranial Nerves: No cranial nerve deficit.      Motor: No weakness.      Gait: Gait normal.      Deep Tendon Reflexes: Reflexes normal.         Assessment & Plan:   1. Primary hypertension goal, will start patient on amlodipine 2.5 mg, goal to keep blood pressure not higher than 160/90 due to patient age, patient will monitor blood pressure at home and report, follow-up in 1 month   2. Acute deep vein thrombosis (DVT) of left peroneal vein (HCC) continue treatment with Eliquis, will check Doppler ultrasound in mid May prior to discontinuation of the Eliquis     Follow-up with hematology at the end of the May      Meds This Visit:  Requested Prescriptions     Signed Prescriptions Disp Refills    amLODIPine 2.5 MG Oral Tab 90 tablet 1     Sig: Take 1 tablet (2.5 mg total) by mouth daily.       Imaging & Referrals:  US VENOUS DOPPLER LEG LEFT - DIAG IMG (CPT=93971)            [1]   Current Outpatient Medications   Medication Sig Dispense Refill    amLODIPine 2.5 MG Oral Tab Take 1 tablet (2.5 mg total) by mouth daily. 90 tablet 1    predniSONE 20 MG Oral Tab Take 1 tablet (20 mg total) by mouth 2 (two) times daily.      pantoprazole 40 MG Oral Tab EC Take 1 tablet (40 mg total) by mouth before breakfast.      cephALEXin 500 MG Oral Cap Take 1 capsule (500 mg total) by mouth 2 (two) times daily.      levocetirizine 5 MG Oral Tab Take 1 tablet (5 mg total) by mouth every evening.      Cholecalciferol (VITAMIN D) 25 MCG (1000 UT) Oral Tab As Directed.      Multiple Vitamin (MULTI VITAMIN DAILY OR) Multi Vitamin Daily      Calcium 200 MG Oral Tab As Directed.      acetaminophen 500 MG Oral Tab Take 500 mg by mouth every 6 (six) hours as needed for Pain.      gabapentin 300 MG Oral Cap Take 1 capsule (300 mg total) by mouth 2 (two) times daily. 60 capsule 2    apixaban (ELIQUIS) 5 MG Oral Tab Take 1 tablet (5  mg total) by mouth 2 (two) times daily. 60 tablet 1    lidocaine 5 % External Patch Place 1 patch onto the skin daily. 10 patch 0    famotidine 40 MG Oral Tab Take 1 tablet (40 mg total) by mouth daily. 30 tablet 5    clotrimazole 1 % External Solution Apply 1 Application. topically 2 (two) times daily. 60 mL 3    brimonidine Tartrate 0.2 % Ophthalmic Solution       Dorzolamide HCl-Timolol Mal 22.3-6.8 MG/ML Ophthalmic Solution       Calcium Carbonate-Vitamin D 500-200 MG-UNIT Oral Tab Take by mouth. Calcium 500 + D 500 mg (1,250 mg)-200 unit tablet      Multiple Vitamins-Minerals (CENTRUM SILVER) Oral Tab Take by mouth. 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    Osteoporosis    Other and unspecified hyperlipidemia   [4]   Past Surgical History:  Procedure Laterality Date    Adenoidectomy      Cataract Bilateral     Colonoscopy      D & c      Skin surgery      Tonsillectomy     [5]   Family History  Problem Relation Age of Onset    Heart Disorder Father     Diabetes Mother     Ovarian Cancer Sister         colon cancer    Colon Cancer Sister     Skin cancer Sister         unknown type    Colon Cancer Sister     Ovarian Cancer Sister     Breast Cancer Sister     Colon Cancer Other         Family H/o   [6]   Social History  Socioeconomic History    Marital status:    Tobacco Use    Smoking status: Never     Passive exposure: Never    Smokeless tobacco: Never   Vaping Use    Vaping status: Never Used   Substance and Sexual Activity    Alcohol use: Yes     Alcohol/week: 0.8 standard drinks of alcohol     Types: 1 Glasses of wine per week     Comment: 1 drink weekly    Drug use: No   Other Topics Concern    History of tanning No    Pt has a pacemaker No    Pt has a defibrillator No    Reaction to local  anesthetic No     Comment: \"Puts me out\"    Caffeine Concern Yes     Comment: Coffee, 5 cups daily;     Left Handed No    Right Handed Yes    Currently spends a great deal of time in the sun No    Hx of Spending Great Deal of Time in Sun Yes    Bad sunburns in the past No    Tanning Salons in the Past No    Hx of Radiation Treatments No

## 2025-04-23 ENCOUNTER — TELEPHONE (OUTPATIENT)
Dept: PHYSICAL MEDICINE AND REHAB | Facility: CLINIC | Age: OVER 89
End: 2025-04-23

## 2025-04-23 NOTE — TELEPHONE ENCOUNTER
Pt called stating she saw her pcp yesterday and it was suggested that she start to wean off of gabapentin. Pt wanted to check with Dr. Behar to see if this is something he agrees with. Please advise, thank you.

## 2025-04-24 NOTE — PROGRESS NOTES
Starting a Weight-Loss Plan: Care Instructions  Overview    It can be a challenge to lose weight. But your doctor can help you make a weight-loss plan that meets your needs.  You don't have to make a lot of big changes at once. A better idea might be to focus on small changes and stick with them. When those changes become habit, you can add a few more changes.  Some people find it helpful to take an exercise or nutrition class. If you have questions, ask your doctor about seeing a registered dietitian or an exercise specialist. You might also think about joining a weight-loss support group.  If you're not ready to make changes right now, try to pick a date in the future. Then make an appointment with your doctor to talk about when and how you'll get started with a plan.  Follow-up care is a key part of your treatment and safety. Be sure to make and go to all appointments, and call your doctor if you are having problems. It's also a good idea to know your test results and keep a list of the medicines you take.  How can you care for yourself as you start a weight-loss plan?  Set realistic goals. Many people expect to lose much more weight than is likely. A weight loss of 5% to 10% of your body weight may be enough to improve your health.  Get family and friends involved to provide support. Talk to them about why you are trying to lose weight, and ask them to help. They can help by participating in exercise and having meals with you, even if they may be eating something different.  Find what works best for you. If you do not have time or do not like to cook, a program that offers meal replacement bars or shakes may be better for you. Or if you like to prepare meals, finding a plan that includes daily menus and recipes may be best.  Ask your doctor about other health professionals who can help you achieve your weight-loss goals.  A dietitian can help you make healthy changes in your diet.  An exercise specialist or  Patient is here today for a scheduled nurse visit. Patient name, , and allergies verified. Order verified in system per PCP.  Patient is to receive pneumonia injection and flu injection Patient received flu injection in left deltoid and the pneumonia 23

## 2025-04-30 RX ORDER — GABAPENTIN 100 MG/1
100 CAPSULE ORAL 3 TIMES DAILY
Qty: 90 CAPSULE | Refills: 0 | Status: SHIPPED | OUTPATIENT
Start: 2025-04-30

## 2025-04-30 NOTE — TELEPHONE ENCOUNTER
Per Dr Behar patient can wean off by taking Gabapentin 100 mg, TID for 1 week, if no side effects or increase in pain take 100 mg BID for one week, if no side effects or increase in pain, take 100 mg nightly for one week, then stop.     Patient was educated on the weaning protocol. New dose of Gabapentin 100 mg sent to pharmacy as per verbal order.   No further questions at this time. Closing the encounter.

## 2025-04-30 NOTE — TELEPHONE ENCOUNTER
Condition update after Procedure.    - Patient had Left L4 and L5 transforaminal epidural steroid injection  (specific procedure) on 4/16/25 (date) with Dr.Behar.  - 80% relief.    Still reports some pain in the left leg.    Patient wants to wean off Gabapentin. She is currently taking Gabapentin 300 mg BID. Asking on Dr Behar's recommendations.     Informed patient that we will inform Dr Behar and call back on the weaning protocol.       - LOV: 4/3/2025 Behar, Alex, MD    - NOV: 6/17/2025 Behar, Alex, MD

## 2025-05-05 ENCOUNTER — HOSPITAL ENCOUNTER (OUTPATIENT)
Dept: CT IMAGING | Facility: HOSPITAL | Age: OVER 89
Discharge: HOME | End: 2025-05-05
Attending: INTERNAL MEDICINE
Payer: MEDICARE

## 2025-05-05 DIAGNOSIS — R91.1 PULMONARY NODULE: ICD-10-CM

## 2025-05-05 PROCEDURE — 71250 CT THORAX DX C-: CPT | Performed by: INTERNAL MEDICINE

## 2025-05-06 ENCOUNTER — HOSPITAL ENCOUNTER (OUTPATIENT)
Dept: ULTRASOUND IMAGING | Facility: HOSPITAL | Age: OVER 89
Discharge: HOME | End: 2025-05-06
Attending: INTERNAL MEDICINE
Payer: MEDICARE

## 2025-05-06 DIAGNOSIS — I82.452 ACUTE DEEP VEIN THROMBOSIS (DVT) OF LEFT PERONEAL VEIN (HCC): ICD-10-CM

## 2025-05-06 PROCEDURE — 93971 EXTREMITY STUDY: CPT | Performed by: INTERNAL MEDICINE

## 2025-05-12 ENCOUNTER — OFFICE VISIT (OUTPATIENT)
Age: OVER 89
End: 2025-05-12
Attending: INTERNAL MEDICINE
Payer: MEDICARE

## 2025-05-12 VITALS
TEMPERATURE: 98 F | SYSTOLIC BLOOD PRESSURE: 188 MMHG | WEIGHT: 109 LBS | RESPIRATION RATE: 18 BRPM | BODY MASS INDEX: 20.06 KG/M2 | OXYGEN SATURATION: 96 % | HEIGHT: 62 IN | DIASTOLIC BLOOD PRESSURE: 66 MMHG | HEART RATE: 72 BPM

## 2025-05-12 DIAGNOSIS — Z51.81 ANTICOAGULATION MANAGEMENT ENCOUNTER: ICD-10-CM

## 2025-05-12 DIAGNOSIS — I82.452 ACUTE DEEP VEIN THROMBOSIS (DVT) OF LEFT PERONEAL VEIN (HCC): Primary | ICD-10-CM

## 2025-05-12 DIAGNOSIS — Z79.01 ANTICOAGULATION MANAGEMENT ENCOUNTER: ICD-10-CM

## 2025-05-12 NOTE — PROGRESS NOTES
[facility] Hematology/Oncology Progress Note     Patient Name: Ladan Rinaldi   YOB: 1930   Medical Record Number: [id number]   CSN: [id number]   Consulting Physician: Alhaji Kiran MD    Diagnosis  1. Acute deep vein thrombosis (DVT) of left peroneal vein (HCC)    2. Anticoagulation management encounter      Current Therapy   Eliquis - started 2/2025    INTERVAL HISTORY    Patient returns for follow-up. Since the last visit she continues on Eliquis that she is tolerating well.  Denies any bleeding issues.  She had a follow-up venous Doppler done last week that shows resolution of previously noted peroneal DVT.  She is ambulating but uses a walker for long distances.  She had an epidural steroid injection that has helped her sciatica.Pt also denies any bleeding, specifically hematuria, hematemesis or hemoptysis.    Past Hematologic History   Ladan Rinaldi is a 89 year old female seen today in the Hematology Clinic  for LLE DVT.  She had developed some sciatica with pain radiating down the left leg to the calf.  This was causing more immobility.  She then developed some pain in the left knee and presented to the emergency room    2/19/2025 x-ray of the knees showed some mild soft tissue swelling with small medial patellofemoral compartment osteophytes.  Venous Dopplers showed popliteal Baker's cyst measuring 6.6 x 1.7 x 2 cm as well as an occlusive thrombus in the left peroneal vein.    She was subsequently started on apixaban and discharged home.  She is tolerating this well and reports calf pain has improved but she continues to have some knee pain.  She has started physical therapy for her sciatica and is scheduled for an MRI of the lumbar spine next week.    At her baseline she is quite active walks about 3 miles a day and drives on her own.  She has not had any previous history of DVT/PE.    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    Osteoporosis    Other and unspecified hyperlipidemia       Past Surgical History:  Past Surgical History:   Procedure Laterality Date    Adenoidectomy      Cataract Bilateral     Colonoscopy      D & c      Skin surgery      Tonsillectomy         Family Medical History:  Family History   Problem Relation Age of Onset    Heart Disorder Father     Diabetes Mother     Ovarian Cancer Sister         colon cancer    Colon Cancer Sister     Skin cancer Sister         unknown type    Colon Cancer Sister     Ovarian Cancer Sister     Breast Cancer Sister     Colon Cancer Other         Family H/o       Gyne History:  OB History    Para Term  AB Living   0 0 0 0 0 0   SAB IAB Ectopic Multiple Live Births   0 0 0 0 0       Social History:  Social History     Socioeconomic History    Marital status:      Spouse name: Not on file    Number of children: Not on file    Years of education: Not on file    Highest education level: Not on file   Occupational History    Not on file   Tobacco Use    Smoking status: Never     Passive exposure: Never    Smokeless tobacco: Never   Vaping Use    Vaping status: Never Used   Substance and Sexual Activity    Alcohol use: Yes     Alcohol/week: 0.8 standard drinks of alcohol     Types: 1 Glasses of wine per week     Comment: 1 drink weekly    Drug use: No    Sexual activity: Not on file   Other Topics Concern    Grew up on a farm Not Asked    History of tanning No    Outdoor occupation Not Asked    Pt has a pacemaker No    Pt has a defibrillator No    Breast feeding Not Asked    Reaction to local anesthetic No     Comment: \"Puts me out\"     Service Not Asked    Blood Transfusions Not Asked    Caffeine Concern Yes     Comment: Coffee, 5 cups daily;     Occupational Exposure Not Asked    Hobby Hazards Not Asked    Sleep Concern Not Asked    Stress Concern Not Asked     Weight Concern Not Asked    Special Diet Not Asked    Back Care Not Asked    Exercise Not Asked    Bike Helmet Not Asked    Seat Belt Not Asked    Self-Exams Not Asked    Left Handed No    Right Handed Yes    Currently spends a great deal of time in the sun No    Past Sunlamp Treatments for Acne Not Asked    Hx of Spending Great Deal of Time in Sun Yes    Bad sunburns in the past No    Tanning Salons in the Past No    Hx of Radiation Treatments No    Regular use of sun block Not Asked   Social History Narrative    Not on file     Social Drivers of Health     Food Insecurity: Not on file   Transportation Needs: Not on file   Housing Stability: Not on file       Allergies:   Allergies[1]    Current Medications:    Current Outpatient Medications:     gabapentin 100 MG Oral Cap, Take 1 capsule (100 mg total) by mouth 3 (three) times daily. Wean off protocol: Take 1 tablet by mouth 3 times a day for 1 week; If there is no side effect or increase in pain, then take 1 tablet, twice a day for one week, then if no side effects, take 1 tablet once at nighttime for one week and then stop the medication., Disp: 90 capsule, Rfl: 0    predniSONE 20 MG Oral Tab, Take 1 tablet (20 mg total) by mouth 2 (two) times daily., Disp: , Rfl:     pantoprazole 40 MG Oral Tab EC, Take 1 tablet (40 mg total) by mouth before breakfast., Disp: , Rfl:     cephALEXin 500 MG Oral Cap, Take 1 capsule (500 mg total) by mouth 2 (two) times daily., Disp: , Rfl:     levocetirizine 5 MG Oral Tab, Take 1 tablet (5 mg total) by mouth every evening., Disp: , Rfl:     Cholecalciferol (VITAMIN D) 25 MCG (1000 UT) Oral Tab, As Directed., Disp: , Rfl:     Calcium 200 MG Oral Tab, As Directed., Disp: , Rfl:     amLODIPine 2.5 MG Oral Tab, Take 1 tablet (2.5 mg total) by mouth daily., Disp: 90 tablet, Rfl: 1    acetaminophen 500 MG Oral Tab, Take 1 tablet (500 mg total) by mouth every 6 (six) hours as needed for Pain., Disp: , Rfl:     apixaban (ELIQUIS) 5 MG  Oral Tab, Take 1 tablet (5 mg total) by mouth 2 (two) times daily., Disp: 60 tablet, Rfl: 1    lidocaine 5 % External Patch, Place 1 patch onto the skin daily., Disp: 10 patch, Rfl: 0    famotidine 40 MG Oral Tab, Take 1 tablet (40 mg total) by mouth daily., Disp: 30 tablet, Rfl: 5    clotrimazole 1 % External Solution, Apply 1 Application. topically 2 (two) times daily., Disp: 60 mL, Rfl: 3    brimonidine Tartrate 0.2 % Ophthalmic Solution, , Disp: , Rfl:     Dorzolamide HCl-Timolol Mal 22.3-6.8 MG/ML Ophthalmic Solution, , Disp: , Rfl:     Calcium Carbonate-Vitamin D 500-200 MG-UNIT Oral Tab, Take by mouth. Calcium 500 + D 500 mg (1,250 mg)-200 unit tablet, Disp: , Rfl:     Multiple Vitamins-Minerals (CENTRUM SILVER) Oral Tab, Take by mouth. take 1 tablet by oral route  every day, Disp: , Rfl:     Review of Systems:  Pertinent positives and negatives noted in the the HPI.     Physical Examination:  BP (!) 188/66 (BP Location: Right arm, Patient Position: Sitting, Cuff Size: adult)   Pulse 72   Temp 97.9 °F (36.6 °C) (Oral)   Resp 18   Ht 1.575 m (5' 2\")   Wt 49.4 kg (109 lb)   SpO2 96%   BMI 19.94 kg/m²    General: Patient is alert and oriented x 3, not in acute distress.  HEENT: EOMs intact. PERRL. Oropharynx is clear.   Neck: No JVD. No palpable lymphadenopathy. Neck is supple.  Lymphatics: There is no palpable lymphadenopathy throughout in the cervical, supraclavicular, or axillary regions.  Chest: Clear to auscultation. No wheezes or rales.  Heart: Regular rate and rhythm. S1S2 normal.  Extremities: Mild swelling around the left knee with some medial joint tenderness consistent with OA.  Homans negative.    Neurological: Grossly intact.       Labs:    Lab Results   Component Value Date/Time    WBC 4.6 02/23/2025 12:38 PM    RBC 3.84 02/23/2025 12:38 PM    HGB 12.8 02/23/2025 12:38 PM    HCT 37.7 02/23/2025 12:38 PM    MCV 98.2 02/23/2025 12:38 PM    MCH 33.3 02/23/2025 12:38 PM    MCHC 34.0 02/23/2025  12:38 PM    RDW 13.4 02/23/2025 12:38 PM    NEPRELIM 2.50 02/23/2025 12:38 PM    .0 02/23/2025 12:38 PM       Impression:  Diagnosis  1. Acute deep vein thrombosis (DVT) of left peroneal vein (HCC)    2. Anticoagulation management encounter      89-year-old lady with provoked left lower extremity DVT after relative immobilization due to sciatica.    Plan:  1.  She is completing 3 months of apixaban and would like to stop it which is reasonable given the distal provoked DVT.    2.  Can transition to aspirin 81 mg daily once she has finished her current supply of apixaban  3.  No routine heme follow-up indicated    Thank you Hannah Salazar for the opportunity to participate in the care of this interesting patient. Please do contact me if I may be of any further assistance    Alhaji Kiran MD  [facility]     Copy to Dr. Angela Dixon MD         [1] No Known Allergies

## 2025-05-30 ENCOUNTER — APPOINTMENT (OUTPATIENT)
Age: OVER 89
End: 2025-05-30
Attending: INTERNAL MEDICINE
Payer: MEDICARE

## 2025-06-02 ENCOUNTER — OFFICE VISIT (OUTPATIENT)
Dept: INTERNAL MEDICINE CLINIC | Facility: CLINIC | Age: OVER 89
End: 2025-06-02

## 2025-06-02 VITALS
SYSTOLIC BLOOD PRESSURE: 157 MMHG | DIASTOLIC BLOOD PRESSURE: 64 MMHG | HEIGHT: 62 IN | BODY MASS INDEX: 21.53 KG/M2 | WEIGHT: 117 LBS | HEART RATE: 55 BPM

## 2025-06-02 DIAGNOSIS — M81.0 AGE-RELATED OSTEOPOROSIS WITHOUT CURRENT PATHOLOGICAL FRACTURE: ICD-10-CM

## 2025-06-02 DIAGNOSIS — M48.061 LUMBAR FORAMINAL STENOSIS: ICD-10-CM

## 2025-06-02 DIAGNOSIS — R91.1 PULMONARY NODULE: ICD-10-CM

## 2025-06-02 DIAGNOSIS — Z00.00 MEDICARE ANNUAL WELLNESS VISIT, SUBSEQUENT: Primary | ICD-10-CM

## 2025-06-02 DIAGNOSIS — J84.10 LUNG GRANULOMA (HCC): ICD-10-CM

## 2025-06-02 DIAGNOSIS — I10 PRIMARY HYPERTENSION: ICD-10-CM

## 2025-06-02 NOTE — PROGRESS NOTES
Subjective:   Ladan Rinaldi is a 89 year old female who presents for a MA AHA (Medicare Advantage Annual Health Assessment) and Medicare Subsequent Annual Wellness visit (Pt already had Initial Annual Wellness) and scheduled follow up of multiple significant but stable problems.   History of Present Illness  Patient reports that she has been feeling better and back to herself, walking without walker, DVT in the left lower extremity resolved, she is done with Eliquis, and he has been taking aspirin 81 mg daily.  She has chronic excessive gas formation and intestinal, states that does not have cough now and does not take pantoprazole prescribed by gastroenterologist.  She is not receiving any treatment for osteoporosis decided not to states that testing and treatment is expensive.    History/Other:   Fall Risk Assessment:   She has been screened for Falls and is low risk.      Cognitive Assessment:   She had a completely normal cognitive assessment - see flowsheet entries     Functional Ability/Status:   Ladan Rinaldi has a completely normal functional assessment. See flowsheet for details.      Depression Screening (PHQ):  PHQ-2 SCORE: 0  , done 6/2/2025            Advanced Directives:   She has a Living Will on file in [employer]; reviewed and discussed documents with patient (and family/surrogate if present).  She has a Power of  for Health Care on file in [employer].    Problem List[1]  Allergies:  She has no known allergies.    Current Medications:  Active Meds, Sig Only[2]    Medical History:  She  has a past medical history of Allergic rhinitis, Atypical nevus (2012), Cancer (HCC), Deep vein thrombosis (HCC), High blood pressure, Melanoma in situ of cheek (HCC) (2012), Osteoporosis, and Other and unspecified hyperlipidemia.  Surgical History:  She  has a past surgical history that includes colonoscopy; d & c; tonsillectomy; adenoidectomy; cataract (Bilateral); and skin surgery.   Family History:  Her family  history includes Breast Cancer in her sister; Colon Cancer in her sister, sister and another family member; Diabetes in her mother; Heart Disorder in her father; Ovarian Cancer in her sister and sister; Skin cancer in her sister.  Social History:  She  reports that she has never smoked. She has never been exposed to tobacco smoke. She has never used smokeless tobacco. She reports current alcohol use of about 0.8 standard drinks of alcohol per week. She reports that she does not use drugs.    Tobacco:  She has never smoked tobacco.    CAGE Alcohol Screen:   CAGE screening score of 0 on 6/2/2025, showing low risk of alcohol abuse.      Patient Care Team:  Angela Dixon MD as PCP - General (Internal Medicine)      Physical Exam  Constitutional:       General: She is not in acute distress.     Appearance: Normal appearance.   HENT:      Head: Normocephalic and atraumatic.   Eyes:      Extraocular Movements: Extraocular movements intact.      Conjunctiva/sclera: Conjunctivae normal.      Pupils: Pupils are equal, round, and reactive to light.   Cardiovascular:      Rate and Rhythm: Normal rate and regular rhythm.   Pulmonary:      Effort: Pulmonary effort is normal. No respiratory distress.      Breath sounds: No wheezing or rhonchi.   Abdominal:      General: Abdomen is flat. There is no distension.      Palpations: Abdomen is soft.   Musculoskeletal:         General: Normal range of motion.      Cervical back: Normal range of motion and neck supple.      Right lower leg: No edema.      Left lower leg: No edema.   Lymphadenopathy:      Cervical: No cervical adenopathy.   Skin:     General: Skin is warm.      Coloration: Skin is not jaundiced.   Neurological:      General: No focal deficit present.      Mental Status: She is alert and oriented to person, place, and time. Mental status is at baseline.   Psychiatric:         Mood and Affect: Mood normal.         Behavior: Behavior normal.         Judgment: Judgment  normal.           /64 (BP Location: Right arm, Patient Position: Sitting, Cuff Size: adult)   Pulse 55   Ht 5' 2\" (1.575 m)   Wt 117 lb (53.1 kg)   BMI 21.40 kg/m²  Estimated body mass index is 21.4 kg/m² as calculated from the following:    Height as of this encounter: 5' 2\" (1.575 m).    Weight as of this encounter: 117 lb (53.1 kg).    Medicare Hearing Assessment:   Hearing Screening    Time taken: 6/2/2025 10:14 AM  Entry User: Eunice Coe MA  Screening Method: Finger Rub  Finger Rub Result: Pass         Visual Acuity:   Right Eye Visual Acuity: Corrected Right Eye Chart Acuity: 20/25   Left Eye Visual Acuity: Corrected Left Eye Chart Acuity: 20/25   Both Eyes Visual Acuity: Corrected Both Eyes Chart Acuity: 20/25   Able To Tolerate Visual Acuity: Yes        Assessment & Plan:   Ladan Rinaldi is a 89 year old female who presents for a Medicare Assessment.     1. Medicare annual wellness visit, subsequent (Primary)  2. Lung granuloma (HCC)  3. Age-related osteoporosis without current pathological fracture stable continue calcium and vitamin D.  Prolia injection if patient chooses stable no treatment needed  4. Primary hypertension most likely controlled, will not bring blood pressure lower than 140/90 because of the age doing well clinically on small dose of antihypertensive medication  5. Pulmonary nodule stable being monitored by pulmonology  6. Lumbar foraminal stenosis stable at present symptoms improved after epidural injection  Follow-up in 6 months  Assessment & Plan    The patient indicates understanding of these issues and agrees to the plan.      Angela Dixon MD, 6/2/2025     Supplementary Documentation:   General Health:  In the past six months, have you lost more than 10 pounds without trying?: 2 - No  Has your appetite been poor?: No  Type of Diet: Balanced  How does the patient maintain a good energy level?: Daily Walks  How would you describe your daily physical activity?:  Moderate  How would you describe your current health state?: Good  How do you maintain positive mental well-being?: Games, Visiting Friends, Visiting Family  On a scale of 0 to 10, with 0 being no pain and 10 being severe pain, what is your pain level?: 0 - (None)  In the past six months, have you experienced urine leakage?: 0-No  At any time do you feel concerned for the safety/well-being of yourself and/or your children, in your home or elsewhere?: No  Have you had any immunizations at another office such as Influenza, Hepatitis B, Tetanus, or Pneumococcal?: No    Health Maintenance   Topic Date Due    Zoster Vaccines (1 of 2) Never done    COVID-19 Vaccine (4 - 2024-25 season) 09/01/2024    Annual Well Visit  01/01/2025    HTN: BP Follow-Up  07/02/2025    Influenza Vaccine (Season Ended) 10/01/2025    Annual Depression Screening  Completed    Fall Risk Screening (Annual)  Completed    Pneumococcal Vaccine: 50+ Years  Completed    Meningococcal B Vaccine  Aged Out            [1]   Patient Active Problem List  Diagnosis    Neoplasm of uncertain behavior of skin    History of melanoma in situ    Gastric reflux syndrome    Osteoporosis    Chronic laryngitis    Vitamin D deficiency    Milia    Actinic keratosis    Inflamed seborrheic keratosis    Pulmonary nodule    Herpes zoster    Sun-damaged skin    Primary hypertension    Left sided sciatica    Bulge of lumbar disc without myelopathy    Lumbar foraminal stenosis    Lumbar radiculopathy   [2]   Outpatient Medications Marked as Taking for the 6/2/25 encounter (Office Visit) with Angela Dixon MD   Medication Sig    gabapentin 100 MG Oral Cap Take 2 capsules (200 mg total) by mouth 3 (three) times daily.    pantoprazole 40 MG Oral Tab EC Take 1 tablet (40 mg total) by mouth before breakfast.    levocetirizine 5 MG Oral Tab Take 1 tablet (5 mg total) by mouth every evening.    Cholecalciferol (VITAMIN D) 25 MCG (1000 UT) Oral Tab As Directed.    Calcium 200 MG Oral  Tab As Directed.    amLODIPine 2.5 MG Oral Tab Take 1 tablet (2.5 mg total) by mouth daily.    acetaminophen 500 MG Oral Tab Take 1 tablet (500 mg total) by mouth every 6 (six) hours as needed for Pain.    famotidine 40 MG Oral Tab Take 1 tablet (40 mg total) by mouth daily.    brimonidine Tartrate 0.2 % Ophthalmic Solution     Dorzolamide HCl-Timolol Mal 22.3-6.8 MG/ML Ophthalmic Solution     Calcium Carbonate-Vitamin D 500-200 MG-UNIT Oral Tab Take by mouth. Calcium 500 + D 500 mg (1,250 mg)-200 unit tablet    Multiple Vitamins-Minerals (CENTRUM SILVER) Oral Tab Take by mouth. take 1 tablet by oral route  every day     Current Facility-Administered Medications for the 6/2/25 encounter (Office Visit) with Angela Dixon MD   Medication    denosumab (Prolia) 60 MG/ML SUBQ injection 60 mg

## 2025-06-10 ENCOUNTER — OFFICE VISIT (OUTPATIENT)
Dept: PHYSICAL MEDICINE AND REHAB | Facility: CLINIC | Age: OVER 89
End: 2025-06-10
Payer: MEDICARE

## 2025-06-10 VITALS — SYSTOLIC BLOOD PRESSURE: 170 MMHG | BODY MASS INDEX: 21 KG/M2 | WEIGHT: 117 LBS | DIASTOLIC BLOOD PRESSURE: 80 MMHG

## 2025-06-10 DIAGNOSIS — M47.816 LUMBAR SPONDYLOSIS: Primary | ICD-10-CM

## 2025-06-10 DIAGNOSIS — M54.16 LUMBAR RADICULOPATHY: ICD-10-CM

## 2025-06-10 DIAGNOSIS — M51.369 BULGE OF LUMBAR DISC WITHOUT MYELOPATHY: ICD-10-CM

## 2025-06-10 DIAGNOSIS — M99.9 BIOMECHANICAL LESION: ICD-10-CM

## 2025-06-10 DIAGNOSIS — M47.816 FACET SYNDROME, LUMBAR: ICD-10-CM

## 2025-06-10 DIAGNOSIS — M48.061 LUMBAR FORAMINAL STENOSIS: ICD-10-CM

## 2025-06-10 DIAGNOSIS — M54.59 MECHANICAL LOW BACK PAIN: ICD-10-CM

## 2025-06-10 DIAGNOSIS — M79.10 MYALGIA: ICD-10-CM

## 2025-06-10 DIAGNOSIS — M51.372 DEGENERATION OF INTERVERTEBRAL DISC OF LUMBOSACRAL REGION WITH DISCOGENIC BACK PAIN AND LOWER EXTREMITY PAIN: ICD-10-CM

## 2025-06-10 PROCEDURE — 1159F MED LIST DOCD IN RCRD: CPT | Performed by: PHYSICAL MEDICINE & REHABILITATION

## 2025-06-10 PROCEDURE — 1160F RVW MEDS BY RX/DR IN RCRD: CPT | Performed by: PHYSICAL MEDICINE & REHABILITATION

## 2025-06-10 PROCEDURE — 99214 OFFICE O/P EST MOD 30 MIN: CPT | Performed by: PHYSICAL MEDICINE & REHABILITATION

## 2025-06-10 PROCEDURE — 1125F AMNT PAIN NOTED PAIN PRSNT: CPT | Performed by: PHYSICAL MEDICINE & REHABILITATION

## 2025-06-10 RX ORDER — GABAPENTIN 100 MG/1
100 CAPSULE ORAL 2 TIMES DAILY
Qty: 120 CAPSULE | Refills: 1 | Status: SHIPPED | OUTPATIENT
Start: 2025-06-10

## 2025-06-10 NOTE — PATIENT INSTRUCTIONS
1) Continue with physical therapy at [facility]  2) Continue Gabapentin twice per day for now  3) Tylenol 500-1000 mg every 6-8 hours as needed for pain.  No more than 3000 mg daily.  4) We can consider repeating a third LEFT L4 and LEFT L5 TFESI but will hold off right now since you are doing so well  5) Lets touch base in about 6 weeks and see if we should decrease gabapentin further or stay the course, or increase if pain has increased.

## 2025-06-10 NOTE — PROGRESS NOTES
[facility]  Progress Note    CHIEF COMPLAINT:    Chief Complaint   Patient presents with    Follow - Up     LOV 4/3/25. Left L4 and L5 transforaminal epidural steroid injections done under fluoroscopic guidance with contrast enhancement on 5/28/25. Pt reports 90% improvement. Pain still reaches 8/10 in L shin, hip. Denies N/T, weakness. Pt takes gabapentin daily with improvement, pt is tapering down to twice daily. PT once weekly helps.       History of Present Illness  Ladan Rinaldi is a 89 year old female with lumbar spondylolisthesis who presents for follow-up after epidural steroid injection.    She experienced significant improvement of 80-90% in her symptoms following a left L4 and L5 transforaminal epidural steroid injection performed on May 28, 2025. Occasional back pain radiating down the left leg has been less frequent. However, after attending physical therapy, she experienced increased leg pain, describing it as a 'shooting pain' down the left leg to the lateral lower leg, which made walking difficult. The pain was intermittent, with a notable episode occurring two days ago, but it has since improved.    She is currently taking gabapentin, which she started to decrease to one in the morning and one in the evening this week. Gabapentin does not cause sleepiness. She also takes Tylenol, usually one extra strength, but takes two if needed for pain management.    She uses a walker for safety due to uncertainty about how far she can walk, although she mentions she can walk 'pretty fast'. Physical therapy is helpful, and she is currently attending sessions at [facility] with therapists Anthony and Danial. Her physical therapy is set to end this week, but she plans to continue with a new prescription.       PAST MEDICAL HISTORY:  Past Medical History[1]    SURGICAL HISTORY:  Past Surgical History[2]    SOCIAL HISTORY:   Social History     Occupational History     Not on file   Tobacco Use    Smoking status: Never     Passive exposure: Never    Smokeless tobacco: Never   Vaping Use    Vaping status: Never Used   Substance and Sexual Activity    Alcohol use: Yes     Alcohol/week: 0.8 standard drinks of alcohol     Types: 1 Glasses of wine per week     Comment: 1 drink weekly    Drug use: No    Sexual activity: Not on file       FAMILY HISTORY:   Family History[3]    CURRENT MEDICATIONS:   Current Medications[4]    ALLERGIES:   Allergies[5]    REVIEW OF SYSTEMS:   Review of Systems   Constitutional: Negative.    HENT: Negative.    Eyes: Negative.    Respiratory: Negative.    Cardiovascular: Negative.    Gastrointestinal: Negative.    Genitourinary: Negative.    Musculoskeletal: As per HPI  Skin: Negative.    Neurological: As per HPI  Endo/Heme/Allergies: Negative.    Psychiatric/Behavioral: Negative.      All other systems reviewed and are negative. Pertinent positives and negatives noted in the HPI.    PHYSICAL EXAM:   BP (!) 170/80   Wt 117 lb (53.1 kg)   BMI 21.40 kg/m²     Body mass index is 21.4 kg/m².      General: No immediate distress  Head: Normocephalic/ Atraumatic  Eyes: Extra-occular movements intact.   Ears: No auricular hematoma or deformities  Mouth: No lesions or ulcerations  Heart: peripheral pulses intact. Normal capillary refill.   Lungs: Non-labored respirations  Abdomen: No abdominal guarding  Extremities: No lower extremity edema bilaterally   Skin: No lesions noted.   Cognition: alert & oriented x 3, attentive, able to follow 2 step commands, comprehension intact, spontaneous speech intact  Motor:    Musculoskeletal:        Data  [facility] Lab Encounter on 02/23/2025   Component Date Value Ref Range Status    WBC 02/23/2025 4.6  4.0 - 11.0 x10(3) uL Final    RBC 02/23/2025 3.84  3.80 - 5.30 x10(6)uL Final    HGB 02/23/2025 12.8  12.0 - 16.0 g/dL Final    HCT 02/23/2025 37.7  35.0 - 48.0 % Final    MCV 02/23/2025 98.2  80.0 - 100.0 fL Final    MCH 02/23/2025  33.3  26.0 - 34.0 pg Final    MCHC 02/23/2025 34.0  31.0 - 37.0 g/dL Final    RDW-SD 02/23/2025 47.7 (H)  35.1 - 46.3 fL Final    RDW 02/23/2025 13.4  11.0 - 15.0 % Final    PLT 02/23/2025 177.0  150.0 - 450.0 10(3)uL Final    Neutrophil Absolute Prelim 02/23/2025 2.50  1.50 - 7.70 x10 (3) uL Final    Neutrophil Absolute 02/23/2025 2.50  1.50 - 7.70 x10(3) uL Final    Lymphocyte Absolute 02/23/2025 1.27  1.00 - 4.00 x10(3) uL Final    Monocyte Absolute 02/23/2025 0.63  0.10 - 1.00 x10(3) uL Final    Eosinophil Absolute 02/23/2025 0.13  0.00 - 0.70 x10(3) uL Final    Basophil Absolute 02/23/2025 0.03  0.00 - 0.20 x10(3) uL Final    Immature Granulocyte Absolute 02/23/2025 0.01  0.00 - 1.00 x10(3) uL Final    Neutrophil % 02/23/2025 54.7  % Final    Lymphocyte % 02/23/2025 27.8  % Final    Monocyte % 02/23/2025 13.8  % Final    Eosinophil % 02/23/2025 2.8  % Final    Basophil % 02/23/2025 0.7  % Final    Immature Granulocyte % 02/23/2025 0.2  % Final    Glucose 02/23/2025 116 (H)  70 - 99 mg/dL Final    Sodium 02/23/2025 139  136 - 145 mmol/L Final    Potassium 02/23/2025 3.9  3.5 - 5.1 mmol/L Final    Chloride 02/23/2025 103  98 - 112 mmol/L Final    CO2 02/23/2025 30.0  21.0 - 32.0 mmol/L Final    Anion Gap 02/23/2025 6  0 - 18 mmol/L Final    BUN 02/23/2025 17  9 - 23 mg/dL Final    Creatinine 02/23/2025 0.66  0.55 - 1.02 mg/dL Final    BUN/CREA Ratio 02/23/2025 25.8 (H)  10.0 - 20.0 Final    Calcium, Total 02/23/2025 9.0  8.7 - 10.4 mg/dL Final    Calculated Osmolality 02/23/2025 291  275 - 295 mOsm/kg Final    eGFR-Cr 02/23/2025 81  >=60 mL/min/1.73m2 Final    ALT 02/23/2025 35  10 - 49 U/L Final    AST 02/23/2025 29  <34 U/L Final    Alkaline Phosphatase 02/23/2025 66  55 - 142 U/L Final    Bilirubin, Total 02/23/2025 0.7  0.2 - 0.9 mg/dL Final    Total Protein 02/23/2025 6.4  5.7 - 8.2 g/dL Final    Albumin 02/23/2025 4.2  3.2 - 4.8 g/dL Final    Globulin  02/23/2025 2.2  2.0 - 3.5 g/dL Final    A/G Ratio  02/23/2025 1.9  1.0 - 2.0 Final    Patient Fasting for CMP? 02/23/2025 No   Final   ]      Radiology Imaging:  I reviewed with the patient her MRI of the lumbar spine   PROCEDURE: MRI SPINE LUMBAR (CPT=72148)     COMPARISON: [facility], XR LUMBAR SPINE (MIN 4 VIEWS) (CPT=72110), 2/10/2025, 3:29 PM.     INDICATIONS: M99.9 Biomechanical lesion M54.59 Mechanical low back pain M47.816 Facet syndrome, lumbar M79.10 Myalgia M54.16 Lumbar radiculopathy M51.369 Bulge of lumbar di*     TECHNIQUE: A variety of imaging planes and parameters were utilized for visualization of suspected pathology.     FINDINGS:  NUMERATION: For the purposes of this examination, the lowest fully formed disc space is designated as L5-S1.  ALIGNMENT: There is preservation of the expected lumbar lordosis.  Trace degenerative anterolisthesis of L4 relative to L5.  BONES: No acute lumbar spine fracture.  Probable benign hemangioma with atypical features/incomplete suppression on STIR in the right posterior L1 vertebrae.  CORD/CAUDA EQUINA: The distal cord and nerve roots have normal caliber, contour, and signal intensity.  PARASPINAL AREA: Incidental S2 sacral Tarlov cyst.  OTHER: Small 6 mm well-circumscribed T2 bright focus in the posterior right hepatic lobe, which statistically relates to a benign cyst or hemangioma.  There is also a simple-appearing right lower pole renal cyst.  Probable small 0.9 cm left ovarian cyst  at the edge of the field of view.  Given small size, this finding is of questionable clinical significance.     LUMBAR DISC LEVELS:  L1-L2: Small diffuse disc bulge.  No significant spinal canal or neural foraminal stenosis.  L2-L3: Mild disc desiccation and degeneration with slight bilateral facet arthropathy.  No significant spinal canal or neural foraminal stenosis.  L3-L4: Small disc bulge with slight ligamentum flavum redundancy, mild dorsal epidural lipomatosis, and mild bilateral facet arthropathy.   No significant spinal canal or neural foraminal stenosis.  L4-L5: Diffuse disc bulge with superimposed left foraminal zone disc protrusion in addition to left greater than right facet arthropathy, ligamentum flavum redundancy, and mild dorsal epidural lipomatosis.  Severe left and moderate right neural foraminal   stenosis with no substantial spinal canal or lateral recess compromise.  L5-S1: Disc and facet related degeneration.  There is also a 7 mm posteriorly directed synovial cyst that arises from the left facet joint, but does not extend toward the thecal sac.  No significant spinal canal or lateral recess stenosis.  Minor right  greater than left neural foraminal stenosis.               Impression   CONCLUSION:     1. Multilevel degenerative changes of the lumbar spine as detailed. Notable levels as follows:     2. L4-L5:  Severe left and moderate right neural foraminal stenosis.  3. L5-S1:  Subcentimeter posteriorly directed synovial cyst that arises from the left facet joint, but does not extend toward the thecal sac.  No significant spinal canal or neural foraminal stenosis.     4. Trace degenerative anterolisthesis of L4 relative to L5.     5. Probable benign hemangioma with atypical features in the L1 vertebral body.     6. Probable small hepatic and right cysts.     7. Lesser incidental findings as above.        [id number]     Dictated by (CST): Miguel Zapata MD on 3/17/2025 at 3:22 PM      Finalized by (CST): Miguel Zapata MD on 3/17/2025 at 3:28 PM         ASSESSMENT AND PLAN:  Ladan is a pleasant 89-year-old female presents for follow-up of her left-sided low back pain and lumbar radiculopathy due to central and severe foraminal stenosis at L4-L5 stemming from a spondylolisthesis and foraminal disc bulge.     Assessment & Plan  Lumbar radiculopathy  Significant improvement post left L4 and L5 transforaminal epidural steroid injection. Occasional shooting pain persists, consistent with L4 and L5  nerve compression due to foraminal stenosis and spondylolisthesis. Gabapentin effective for nerve pain, dosage maintained due to symptom flare-up. Third injection deferred, reassessment in six weeks.  - Continue gabapentin 100 mg twice daily.  - Advise acetaminophen 1000 mg every 8 hours as needed for pain.  - Consider repeating left L4 and L5 transforaminal epidural steroid injection if symptoms worsen, but hold off for now.  - Continue physical therapy at [facility].  - Reassess in 6 weeks to evaluate pain management and consider adjusting gabapentin dosage.    Spondylolisthesis  Contributing to foraminal narrowing and nerve compression at L4 and L5, exacerbating lumbar radiculopathy.  - Continue current management as outlined for lumbar radiculopathy.    Ligamentum flavum hypertrophy  Contributing to spinal canal narrowing and nerve compression, part of degenerative changes with aging and spondylolisthesis.  - Continue current management as outlined for lumbar radiculopathy.    Foraminal stenosis at L4-L5  Causing compression of L4 and L5 nerve roots, contributing to lumbar radiculopathy symptoms due to degenerative changes.  - Continue current management as outlined for lumbar radiculopathy.      RTC in 6 weeks  Discharge Instructions were provided as documented in AVS summary.  The patient was in agreement with the assessment and plan.  All questions were answered.  There were no barriers to learning.         1. Lumbar spondylosis    2. Degeneration of intervertebral disc of lumbosacral region with discogenic back pain and lower extremity pain    3. Lumbar foraminal stenosis    4. Bulge of lumbar disc without myelopathy    5. Lumbar radiculopathy    6. Myalgia    7. Facet syndrome, lumbar    8. Mechanical low back pain    9. Biomechanical lesion        Alex B. Behar MD  Physical Medicine and Rehabilitation/Sports Medicine  [facility]  21st Century Cures Act Notice to Patient: Medical documents  like this are made available to patients in the interest of transparency. However, be advised this is a medical document and it is intended as [id number] communication between your medical providers. This medical document may contain abbreviations, assessments, medical data, and results or other terms that are unfamiliar. Medical documents are intended to carry relevant information, facts as evident, and the clinical opinion of the practitioner. As such, this medical document may be written in language that appears blunt or direct. You are encouraged to contact your medical provider and/or [facility] if you have any questions about this medical document.   Abridge tool was used for dictation purposes only and the patient was not recorded at any point during the visit.              [1]   Past Medical History:   Allergic rhinitis    Atypical nevus    skin of right cheek    Cancer (HCC)    Deep vein thrombosis (HCC)    High blood pressure    Melanoma in situ of cheek (HCC)    \"Malignant melanoma in situ - Right cheek    Osteoporosis    Other and unspecified hyperlipidemia   [2]   Past Surgical History:  Procedure Laterality Date    Adenoidectomy      Cataract Bilateral     Colonoscopy      D & c      Skin surgery      Tonsillectomy     [3]   Family History  Problem Relation Age of Onset    Heart Disorder Father     Diabetes Mother     Ovarian Cancer Sister         colon cancer    Colon Cancer Sister     Skin cancer Sister         unknown type    Colon Cancer Sister     Ovarian Cancer Sister     Breast Cancer Sister     Colon Cancer Other         Family H/o   [4]   Current Outpatient Medications   Medication Sig Dispense Refill    gabapentin 100 MG Oral Cap Take 1 capsule (100 mg total) by mouth 2 (two) times daily. 120 capsule 1    predniSONE 20 MG Oral Tab Take 1 tablet (20 mg total) by mouth 2 (two) times daily. (Patient not taking: Reported on 6/2/2025)      pantoprazole 40 MG Oral Tab EC  Take 1 tablet (40 mg total) by mouth before breakfast.      cephALEXin 500 MG Oral Cap Take 1 capsule (500 mg total) by mouth 2 (two) times daily. (Patient not taking: Reported on 6/2/2025)      levocetirizine 5 MG Oral Tab Take 1 tablet (5 mg total) by mouth every evening.      Cholecalciferol (VITAMIN D) 25 MCG (1000 UT) Oral Tab As Directed.      Calcium 200 MG Oral Tab As Directed.      amLODIPine 2.5 MG Oral Tab Take 1 tablet (2.5 mg total) by mouth daily. 90 tablet 1    acetaminophen 500 MG Oral Tab Take 1 tablet (500 mg total) by mouth every 6 (six) hours as needed for Pain.      lidocaine 5 % External Patch Place 1 patch onto the skin daily. (Patient not taking: Reported on 6/2/2025) 10 patch 0    famotidine 40 MG Oral Tab Take 1 tablet (40 mg total) by mouth daily. 30 tablet 5    clotrimazole 1 % External Solution Apply 1 Application. topically 2 (two) times daily. (Patient not taking: Reported on 6/2/2025) 60 mL 3    brimonidine Tartrate 0.2 % Ophthalmic Solution       Dorzolamide HCl-Timolol Mal 22.3-6.8 MG/ML Ophthalmic Solution       Calcium Carbonate-Vitamin D 500-200 MG-UNIT Oral Tab Take by mouth. Calcium 500 + D 500 mg (1,250 mg)-200 unit tablet      Multiple Vitamins-Minerals (CENTRUM SILVER) Oral Tab Take by mouth. take 1 tablet by oral route  every day     [5] No Known Allergies

## 2025-07-18 RX ORDER — PREDNISOLONE ACETATE 10 MG/ML
SUSPENSION/ DROPS OPHTHALMIC
COMMUNITY

## 2025-07-21 ENCOUNTER — OFFICE VISIT (OUTPATIENT)
Dept: PHYSICAL MEDICINE AND REHAB | Facility: CLINIC | Age: OVER 89
End: 2025-07-21
Payer: MEDICARE

## 2025-07-21 VITALS
SYSTOLIC BLOOD PRESSURE: 140 MMHG | BODY MASS INDEX: 21.53 KG/M2 | DIASTOLIC BLOOD PRESSURE: 70 MMHG | WEIGHT: 117 LBS | OXYGEN SATURATION: 95 % | HEIGHT: 62 IN | HEART RATE: 67 BPM

## 2025-07-21 DIAGNOSIS — M99.9 BIOMECHANICAL LESION: ICD-10-CM

## 2025-07-21 DIAGNOSIS — M54.59 MECHANICAL LOW BACK PAIN: ICD-10-CM

## 2025-07-21 DIAGNOSIS — M79.10 MYALGIA: ICD-10-CM

## 2025-07-21 DIAGNOSIS — M51.372 DEGENERATION OF INTERVERTEBRAL DISC OF LUMBOSACRAL REGION WITH DISCOGENIC BACK PAIN AND LOWER EXTREMITY PAIN: ICD-10-CM

## 2025-07-21 DIAGNOSIS — M54.16 LUMBAR RADICULOPATHY: ICD-10-CM

## 2025-07-21 DIAGNOSIS — M47.816 FACET SYNDROME, LUMBAR: ICD-10-CM

## 2025-07-21 DIAGNOSIS — M51.369 BULGE OF LUMBAR DISC WITHOUT MYELOPATHY: ICD-10-CM

## 2025-07-21 DIAGNOSIS — M48.061 LUMBAR FORAMINAL STENOSIS: ICD-10-CM

## 2025-07-21 DIAGNOSIS — M47.816 LUMBAR SPONDYLOSIS: Primary | ICD-10-CM

## 2025-07-21 PROCEDURE — 1159F MED LIST DOCD IN RCRD: CPT | Performed by: PHYSICAL MEDICINE & REHABILITATION

## 2025-07-21 PROCEDURE — 1125F AMNT PAIN NOTED PAIN PRSNT: CPT | Performed by: PHYSICAL MEDICINE & REHABILITATION

## 2025-07-21 PROCEDURE — 1160F RVW MEDS BY RX/DR IN RCRD: CPT | Performed by: PHYSICAL MEDICINE & REHABILITATION

## 2025-07-21 PROCEDURE — 99214 OFFICE O/P EST MOD 30 MIN: CPT | Performed by: PHYSICAL MEDICINE & REHABILITATION

## 2025-07-21 NOTE — PROGRESS NOTES
[facility]  Progress Note    CHIEF COMPLAINT:    Chief Complaint   Patient presents with    Follow - Up     LOV 6/10/2025. Pt here for f/u presents with low back pain. Pain is 6/10. Denies N/T or weakness. Taking gabapentin, tylenol. Completed PT with relief.        History of Present Illness  Ladan Rinaldi is a 89 year old female with lumbar spinal stenosis who presents with recurrent back pain.    She describes her back pain as a 'catch' in the back, hip, groin, or buttock area, with soreness primarily in the buttock. The discomfort is severe, and at its worst, it was 'completely miserable'.    She underwent a left L4 and L5 transforaminal epidural injection on May 28, 2025, which initially improved her condition by 90%. However, she now estimates her improvement at 75% since the procedure.    She is currently taking gabapentin, 100 mg twice daily, and inquires about increasing the dose during flare-ups. She also uses Tylenol as needed for pain management.    She has been engaging in a home exercise program and has resumed walking at the mall, using a walker for stability during potential flare-ups. She wants to walk independently and is considering using walking poles for support.    She experiences occasional groin pain, especially when twisting. She sometimes favors one side when sitting down due to pain.       PAST MEDICAL HISTORY:  Past Medical History[1]    SURGICAL HISTORY:  Past Surgical History[2]    SOCIAL HISTORY:   Social History     Occupational History    Not on file   Tobacco Use    Smoking status: Never     Passive exposure: Never    Smokeless tobacco: Never   Vaping Use    Vaping status: Never Used   Substance and Sexual Activity    Alcohol use: Yes     Alcohol/week: 0.8 standard drinks of alcohol     Types: 1 Glasses of wine per week     Comment: 1 drink weekly    Drug use: No    Sexual activity: Not on file       FAMILY HISTORY:   Family  History[3]    CURRENT MEDICATIONS:   Current Medications[4]    ALLERGIES:   Allergies[5]    REVIEW OF SYSTEMS:   Review of Systems   Constitutional: Negative.    HENT: Negative.    Eyes: Negative.    Respiratory: Negative.    Cardiovascular: Negative.    Gastrointestinal: Negative.    Genitourinary: Negative.    Musculoskeletal: As per HPI  Skin: Negative.    Neurological: As per HPI  Endo/Heme/Allergies: Negative.    Psychiatric/Behavioral: Negative.      All other systems reviewed and are negative. Pertinent positives and negatives noted in the HPI.    PHYSICAL EXAM:   /70   Pulse 67   Ht 62\"   Wt 117 lb (53.1 kg)   SpO2 95%   BMI 21.40 kg/m²     Body mass index is 21.4 kg/m².      General: No immediate distress  Head: Normocephalic/ Atraumatic  Eyes: Extra-occular movements intact.   Ears: No auricular hematoma or deformities  Mouth: No lesions or ulcerations  Heart: peripheral pulses intact. Normal capillary refill.   Lungs: Non-labored respirations  Abdomen: No abdominal guarding  Extremities: No lower extremity edema bilaterally   Skin: No lesions noted.   Cognition: alert & oriented x 3, attentive, able to follow 2 step commands, comprehension intact, spontaneous speech intact  Motor:    Musculoskeletal:        Data  [facility] Lab Encounter on 02/23/2025   Component Date Value Ref Range Status    WBC 02/23/2025 4.6  4.0 - 11.0 x10(3) uL Final    RBC 02/23/2025 3.84  3.80 - 5.30 x10(6)uL Final    HGB 02/23/2025 12.8  12.0 - 16.0 g/dL Final    HCT 02/23/2025 37.7  35.0 - 48.0 % Final    MCV 02/23/2025 98.2  80.0 - 100.0 fL Final    MCH 02/23/2025 33.3  26.0 - 34.0 pg Final    MCHC 02/23/2025 34.0  31.0 - 37.0 g/dL Final    RDW-SD 02/23/2025 47.7 (H)  35.1 - 46.3 fL Final    RDW 02/23/2025 13.4  11.0 - 15.0 % Final    PLT 02/23/2025 177.0  150.0 - 450.0 10(3)uL Final    Neutrophil Absolute Prelim 02/23/2025 2.50  1.50 - 7.70 x10 (3) uL Final    Neutrophil Absolute 02/23/2025 2.50  1.50 - 7.70 x10(3) uL  Final    Lymphocyte Absolute 02/23/2025 1.27  1.00 - 4.00 x10(3) uL Final    Monocyte Absolute 02/23/2025 0.63  0.10 - 1.00 x10(3) uL Final    Eosinophil Absolute 02/23/2025 0.13  0.00 - 0.70 x10(3) uL Final    Basophil Absolute 02/23/2025 0.03  0.00 - 0.20 x10(3) uL Final    Immature Granulocyte Absolute 02/23/2025 0.01  0.00 - 1.00 x10(3) uL Final    Neutrophil % 02/23/2025 54.7  % Final    Lymphocyte % 02/23/2025 27.8  % Final    Monocyte % 02/23/2025 13.8  % Final    Eosinophil % 02/23/2025 2.8  % Final    Basophil % 02/23/2025 0.7  % Final    Immature Granulocyte % 02/23/2025 0.2  % Final    Glucose 02/23/2025 116 (H)  70 - 99 mg/dL Final    Sodium 02/23/2025 139  136 - 145 mmol/L Final    Potassium 02/23/2025 3.9  3.5 - 5.1 mmol/L Final    Chloride 02/23/2025 103  98 - 112 mmol/L Final    CO2 02/23/2025 30.0  21.0 - 32.0 mmol/L Final    Anion Gap 02/23/2025 6  0 - 18 mmol/L Final    BUN 02/23/2025 17  9 - 23 mg/dL Final    Creatinine 02/23/2025 0.66  0.55 - 1.02 mg/dL Final    BUN/CREA Ratio 02/23/2025 25.8 (H)  10.0 - 20.0 Final    Calcium, Total 02/23/2025 9.0  8.7 - 10.4 mg/dL Final    Calculated Osmolality 02/23/2025 291  275 - 295 mOsm/kg Final    eGFR-Cr 02/23/2025 81  >=60 mL/min/1.73m2 Final    ALT 02/23/2025 35  10 - 49 U/L Final    AST 02/23/2025 29  <34 U/L Final    Alkaline Phosphatase 02/23/2025 66  55 - 142 U/L Final    Bilirubin, Total 02/23/2025 0.7  0.2 - 0.9 mg/dL Final    Total Protein 02/23/2025 6.4  5.7 - 8.2 g/dL Final    Albumin 02/23/2025 4.2  3.2 - 4.8 g/dL Final    Globulin  02/23/2025 2.2  2.0 - 3.5 g/dL Final    A/G Ratio 02/23/2025 1.9  1.0 - 2.0 Final    Patient Fasting for CMP? 02/23/2025 No   Final   ]      Radiology Imaging:  I reviewed with the patient her MRI of the lumbar spine   PROCEDURE: MRI SPINE LUMBAR (CPT=72148)     COMPARISON: [facility], XR LUMBAR SPINE (MIN 4 VIEWS) (CPT=72110), 2/10/2025, 3:29 PM.     INDICATIONS: M99.9 Biomechanical  lesion M54.59 Mechanical low back pain M47.816 Facet syndrome, lumbar M79.10 Myalgia M54.16 Lumbar radiculopathy M51.369 Bulge of lumbar di*     TECHNIQUE: A variety of imaging planes and parameters were utilized for visualization of suspected pathology.     FINDINGS:  NUMERATION: For the purposes of this examination, the lowest fully formed disc space is designated as L5-S1.  ALIGNMENT: There is preservation of the expected lumbar lordosis.  Trace degenerative anterolisthesis of L4 relative to L5.  BONES: No acute lumbar spine fracture.  Probable benign hemangioma with atypical features/incomplete suppression on STIR in the right posterior L1 vertebrae.  CORD/CAUDA EQUINA: The distal cord and nerve roots have normal caliber, contour, and signal intensity.  PARASPINAL AREA: Incidental S2 sacral Tarlov cyst.  OTHER: Small 6 mm well-circumscribed T2 bright focus in the posterior right hepatic lobe, which statistically relates to a benign cyst or hemangioma.  There is also a simple-appearing right lower pole renal cyst.  Probable small 0.9 cm left ovarian cyst  at the edge of the field of view.  Given small size, this finding is of questionable clinical significance.     LUMBAR DISC LEVELS:  L1-L2: Small diffuse disc bulge.  No significant spinal canal or neural foraminal stenosis.  L2-L3: Mild disc desiccation and degeneration with slight bilateral facet arthropathy.  No significant spinal canal or neural foraminal stenosis.  L3-L4: Small disc bulge with slight ligamentum flavum redundancy, mild dorsal epidural lipomatosis, and mild bilateral facet arthropathy.  No significant spinal canal or neural foraminal stenosis.  L4-L5: Diffuse disc bulge with superimposed left foraminal zone disc protrusion in addition to left greater than right facet arthropathy, ligamentum flavum redundancy, and mild dorsal epidural lipomatosis.  Severe left and moderate right neural foraminal   stenosis with no substantial spinal canal or  lateral recess compromise.  L5-S1: Disc and facet related degeneration.  There is also a 7 mm posteriorly directed synovial cyst that arises from the left facet joint, but does not extend toward the thecal sac.  No significant spinal canal or lateral recess stenosis.  Minor right  greater than left neural foraminal stenosis.               Impression   CONCLUSION:     1. Multilevel degenerative changes of the lumbar spine as detailed. Notable levels as follows:     2. L4-L5:  Severe left and moderate right neural foraminal stenosis.  3. L5-S1:  Subcentimeter posteriorly directed synovial cyst that arises from the left facet joint, but does not extend toward the thecal sac.  No significant spinal canal or neural foraminal stenosis.     4. Trace degenerative anterolisthesis of L4 relative to L5.     5. Probable benign hemangioma with atypical features in the L1 vertebral body.     6. Probable small hepatic and right cysts.     7. Lesser incidental findings as above.        elm-remote     Dictated by (CST): Miguel Zapata MD on 3/17/2025 at 3:22 PM      Finalized by (CST): Miguel Zapata MD on 3/17/2025 at 3:28 PM         ASSESSMENT AND PLAN:  Ladan is a pleasant 89-year-old female who presents for follow-up of her left-sided low back pain with radiation down the left leg due to a lumbar radiculopathy.  She is doing fairly okay and has improved following the left L4 and left L5 transforaminal epidural steroid injection in May 2025.  I am recommending she continue home exercise program and gabapentin.  She will follow-up in about 6 to 8 weeks with me.     Assessment & Plan  Lumbar radiculopathy  75% improvement post left L4 and L5 transforaminal epidural injection. Gabapentin effective for neuropathic pain. Potential for flare-ups requires careful management.  - Continue gabapentin 100 mg orally twice daily, may increase to 200 mg twice daily if needed.  - Continue home exercise program.  - Use Tylenol as needed for  pain.  - Encourage walking with poles for stability.  - Avoid using a belt.  - Schedule follow-up in 6-8 weeks.    Foraminal stenosis at L4-L5  Chronic condition causing nerve compression and radiculopathy. Spine-neutral position beneficial.  - Continue home exercise program focusing on spine-neutral positions.  - Encourage walking with poles for stability.  - Schedule follow-up in 6-8 weeks.    Mechanical low back pain  Intermittent soreness managed with medication and exercise. Emphasize activity to prevent deconditioning.  - Continue gabapentin 100 mg orally twice daily, may increase to 200 mg twice daily if needed.  - Use Tylenol as needed for pain.  - Continue home exercise program.  - Encourage walking with poles for stability.  - Avoid using a belt.  - Schedule follow-up in 6-8 weeks.      RTC in 6 to 8 weeks  Discharge Instructions were provided as documented in AVS summary.  The patient was in agreement with the assessment and plan.  All questions were answered.  There were no barriers to learning.         1. Lumbar spondylosis    2. Degeneration of intervertebral disc of lumbosacral region with discogenic back pain and lower extremity pain    3. Lumbar foraminal stenosis    4. Bulge of lumbar disc without myelopathy    5. Lumbar radiculopathy    6. Myalgia    7. Facet syndrome, lumbar    8. Mechanical low back pain    9. Biomechanical lesion        Alex B. Behar MD  Physical Medicine and Rehabilitation/Sports Medicine  [facility]  Vessel Cures Act Notice to Patient: Medical documents like this are made available to patients in the interest of transparency. However, be advised this is a medical document and it is intended as ldik-pf-wrmy communication between your medical providers. This medical document may contain abbreviations, assessments, medical data, and results or other terms that are unfamiliar. Medical documents are intended to carry relevant information, facts as  evident, and the clinical opinion of the practitioner. As such, this medical document may be written in language that appears blunt or direct. You are encouraged to contact your medical provider and/or [facility] Experience if you have any questions about this medical document.   Abridge tool was used for dictation purposes only and the patient was not recorded at any point during the visit.              [1]   Past Medical History:   Allergic rhinitis    Atypical nevus    skin of right cheek    Cancer (HCC)    Deep vein thrombosis (HCC)    High blood pressure    Melanoma in situ of cheek (HCC)    \"Malignant melanoma in situ - Right cheek    Osteoporosis    Other and unspecified hyperlipidemia   [2]   Past Surgical History:  Procedure Laterality Date    Adenoidectomy      Cataract Bilateral     Colonoscopy      D & c      Skin surgery      Tonsillectomy     [3]   Family History  Problem Relation Age of Onset    Heart Disorder Father     Diabetes Mother     Ovarian Cancer Sister         colon cancer    Colon Cancer Sister     Skin cancer Sister         unknown type    Colon Cancer Sister     Ovarian Cancer Sister     Breast Cancer Sister     Colon Cancer Other         Family H/o   [4]   Current Outpatient Medications   Medication Sig Dispense Refill    prednisoLONE 1 % Ophthalmic Suspension 1 drop into left eye Ophthalmic Twice a day for 30 days      Omega-3 Fatty Acids (FISH OIL) 300 MG Oral Cap As Directed.      ASPIRIN 81 OR Aspirin 81      gabapentin 100 MG Oral Cap Take 1 capsule (100 mg total) by mouth 2 (two) times daily. 120 capsule 1    predniSONE 20 MG Oral Tab Take 1 tablet (20 mg total) by mouth 2 (two) times daily. (Patient not taking: Reported on 6/2/2025)      pantoprazole 40 MG Oral Tab EC Take 1 tablet (40 mg total) by mouth before breakfast.      cephALEXin 500 MG Oral Cap Take 1 capsule (500 mg total) by mouth 2 (two) times daily. (Patient not taking: Reported on 6/2/2025)       levocetirizine 5 MG Oral Tab Take 1 tablet (5 mg total) by mouth every evening.      Cholecalciferol (VITAMIN D) 25 MCG (1000 UT) Oral Tab As Directed.      Calcium 200 MG Oral Tab As Directed.      amLODIPine 2.5 MG Oral Tab Take 1 tablet (2.5 mg total) by mouth daily. 90 tablet 1    acetaminophen 500 MG Oral Tab Take 1 tablet (500 mg total) by mouth every 6 (six) hours as needed for Pain.      lidocaine 5 % External Patch Place 1 patch onto the skin daily. (Patient not taking: Reported on 6/2/2025) 10 patch 0    famotidine 40 MG Oral Tab Take 1 tablet (40 mg total) by mouth daily. 30 tablet 5    clotrimazole 1 % External Solution Apply 1 Application. topically 2 (two) times daily. (Patient not taking: Reported on 6/2/2025) 60 mL 3    brimonidine Tartrate 0.2 % Ophthalmic Solution       Dorzolamide HCl-Timolol Mal 22.3-6.8 MG/ML Ophthalmic Solution       Calcium Carbonate-Vitamin D 500-200 MG-UNIT Oral Tab Take by mouth. Calcium 500 + D 500 mg (1,250 mg)-200 unit tablet      Multiple Vitamins-Minerals (CENTRUM SILVER) Oral Tab Take by mouth. take 1 tablet by oral route  every day     [5] No Known Allergies

## 2025-07-21 NOTE — PATIENT INSTRUCTIONS
1) Continue Gabapentin 100 mg twice per day but can increase to 200 mg nestor per day if needed  2) Continue home exercise program   3) Tylenol 500-1000 mg every 6-8 hours as needed for pain.  No more than 3000 mg daily.  4) See me in 6-8 weeks

## 2025-08-04 ENCOUNTER — TELEPHONE (OUTPATIENT)
Dept: ENDOCRINOLOGY CLINIC | Facility: CLINIC | Age: OVER 89
End: 2025-08-04

## (undated) DIAGNOSIS — R91.1 PULMONARY NODULE: Primary | ICD-10-CM

## (undated) NOTE — LETTER
[address] [address]      4/7/2022    Dear Inocente Burris,    It has come to our attention that you are due for: CT Chest  in May. This test was ordered by Dr. Karla Carrizales. If you are allergic to iodine or have any questions, please call the office at [phone].        Thank you,         The Pulmonary Clinical Staff

## (undated) NOTE — MR AVS SNAPSHOT
[facility] - [place] [address] [address] [address] [phone]               Thank you for choosing us for your health care visit with RAMAKRISHNA Diego.   We are glad to serve you and happy to provide you with this summary of Biotin 1000 MCG Tabs   Take  by mouth. BIOTIN (unknown strength)           CALCIUM 500/D 500-200 MG-UNIT Tabs   Generic drug:  Calcium Carbonate-Vitamin D   Take  by mouth.  Calcium 500 + D 500 mg (1,250 mg)-200 unit tablet           CENTRUM SILVER Tabs insurance company's guidelines for prior authorization for this test.  You may be held responsible for payment in full if proper authorization is not acquired.   Please contact the Patient Business Office at [phone] if you have any questions related to

## (undated) NOTE — MR AVS SNAPSHOT
[facility] - [place] [address] [address] [id number] [phone]               Thank you for choosing us for your health care visit with RAMAKRISHNA Lundy.   We are glad to serve you and happy to provide you with this summary of CENTRUM SILVER Tabs   Take  by mouth.  take 1 tablet by oral route  every day           latanoprost 0.005 % Soln   Commonly known as:  XALATAN           omeprazole 20 MG Cpdr   Take 1 capsule (20 mg total) by mouth every morning before breakfast.   Commonl walking, light jogging, cycling, swimming, etc.) for a goal of at least 150 minutes per week. Moderation of alcohol consumption Men: limit to <= 2 drinks* per day. Women and lighter weight persons: limit to <= 1 drink* per day.          Your blood pressu

## (undated) NOTE — LETTER
18      Patient: Umesh Channel  : 1930 Visit date: 2018    Dear Suzanne Oneal,      I examined your patient in consultation today. She has a right small finger closed mallet with fracture.   This can be treated with 6 weeks of

## (undated) NOTE — MR AVS SNAPSHOT
[facility] SPECIALTY [facility] - [place] [address] [address]  [id number] [phone]               Thank you for choosing us for your health care visit with Elena Whitman MD.  We are glad to serve you and happy to provide you with this summary of Take 1 capsule (20 mg total) by mouth every morning before breakfast.   Commonly known as:  PRILOSEC           RA FISH OIL 1000 MG Caps   Take  by mouth.  FISH OIL (unknown strength)                   MyChart     Call the helpdesk for assistance with your i

## (undated) NOTE — MR AVS SNAPSHOT
[facility] SPECIALTY [facility] - [place] [address] [address]  [address] [phone]               Thank you for choosing us for your health care visit with Saloni Piña MD.  We are glad to serve you and happy to provide you with this summary of yo Instructions:   To schedule a test at any [facility], call Central Scheduling at [phone], Monday through Friday between 7:30am to 6pm and on Saturday between 8am and 1pm. Evening and weekend appointments for your exam are a authorization numbers or be assured that none are required. You can then schedule your appointment. Failure to obtain required authorization numbers can create reimbursement difficulties for you.     Assoc Dx:  Encounter for screening mammogram for malignan Take  by mouth.  FISH OIL (unknown strength)                Where to Get Your Medications      These medications were sent to [facility] [address] [facility], [address]  [phone], [facility] [address]

## (undated) NOTE — LETTER
Hospital Discharge Documentation  Please phone to schedule a hospital follow up appointment.     From: [address] Hospitalist's Office  Phone: [phone]    Patient discharged time/date: 2/2/2017 12:27 PM  Patient discharge disposition:  Home or Self Musculoskeletal:  No joint swelling  Extremities:  No edema, no cyanosis, no clubbing  Neurologic:  nonfocal  Psychiatric:  Normal affect, calm and appropriate  Skin:  No rash, no lesion         Discharge Medications      START taking these medications NON FORMULARY        Inject 60 mg into the skin every 6 (six) months.  PROLIA    Refills:  0       omeprazole 20 MG Cpdr   Commonly known as:  PRILOSEC        Take 1 capsule (20 mg total) by mouth every morning before breakfast.    Quantity:  90 capsule

## (undated) NOTE — MR AVS SNAPSHOT
[address] [facility]  [phone]               Thank you for choosing us for your health care visit with Ariel Hernandez MD.  We are glad to serve you and happy to provide you with this summar · Eat fewer acidic foods, such as citrus and tomato-based foods. These can increase symptoms. · Limit drinking alcohol, caffeine, and fizzy beverages. All increase acid reflux.   · Try limiting chocolate, peppermint, and spearmint. These can worsen acid re Fluticasone Propionate 50 MCG/ACT Susp   2 sprays by Each Nare route daily. Commonly known as:  FLONASE           latanoprost 0.005 % Soln   Place 1 drop into both eyes nightly.    Commonly known as:  XALATAN           NON FORMULARY   Inject 60 mg into t

## (undated) NOTE — LETTER
18      Patient: Song Tamez  : 1930 Visit date: 2018    Dear Raisa,      I examined your patient in follow-up today. She is 2 months post right small finger mallet with fracture.   She was splinted for 6 weeks and

## (undated) NOTE — MR AVS SNAPSHOT
[facility] SPECIALTY [facility] - [place] [address] [address] [address] [phone]               Thank you for choosing us for your health care visit with RAMAKRISHNA Nicholson.   We are glad to serve you and happy to provide you with this summary of Place 1 drop into both eyes nightly. Commonly known as:  XALATAN           NON FORMULARY   Inject 60 mg into the skin every 6 (six) months.  PROLIA           omeprazole 20 MG Cpdr   Take 1 capsule (20 mg total) by mouth every morning before breakfast.   C

## (undated) NOTE — ED AVS SNAPSHOT
Sukhjinder Brennan   MRN: [id number]    Department:  [facility]   Date of Visit:  3/13/2019           Disclosure     Insurance plans vary and the physician(s) referred by the ER may not be covered by your plan.  Please contact within the next three months to obtain basic health screening including reassessment of your blood pressure.     IF THERE IS ANY CHANGE OR WORSENING OF YOUR CONDITION, CALL YOUR PRIMARY CARE PHYSICIAN AT ONCE OR RETURN IMMEDIATELY TO THE EMERGENCY DEPARTMEN

## (undated) NOTE — IP AVS SNAPSHOT
[address]  [address] [facility] [phone]                Discharge Summary   1/30/2017    [facility]           Admission Information        Provider Department    1/30/2017 Gera Edwards MD [facility] 5sw/Se Last time this was given:  81 mg on 2/2/2017  8:15 AM   Generic drug:  aspirin   Next dose due:  02/03/2017        Take  by mouth.  Aspir-81 81 mg tablet,delayed release                            benzonatate 100 MG Caps   Commonly known as:  TESSALON PERLE Call physician with fever, shortness of breath, vomiting or diarrhea  Keep follow up appointments    Discharge References/Attachments     ADULT, PNEUMONIA (ENGLISH)    ALBUTEROL INHALATION AEROSOL (ENGLISH)    AZITHROMYCIN TABLETS (ENGLISH)      Follow-up 1 -- (02/01/17)  2.0 (02/01/17)  1.4 (02/01/17)  0.5 (02/01/17)  0.2 (02/01/17)  0.0    (01/31/17)  46 (01/31/17)  36 (01/31/17)  15 (01/31/17)  2 (01/31/17)  1  (01/31/17)  1.7 (L) (01/31/17)  1.3 (01/31/17)  0.6 (01/31/17)  0.1 (01/31/17)  0.0    (01/30/ [place] 112. MyChart     Visit [employer]  You can access your MyChart to more actively manage your health care and view more details from this visit by going to [email or web address].   If you've recently had a stay at the [facility] Non-Narcotic Pain Medications     aspirin (ASPIR-81) 81 MG Oral Tab EC       Use: Treat pain, fever, inflammation   Most common side effects: Stomach upset   What to report to your healthcare team: Stomach upset, unresolved pain           GI Medications

## (undated) NOTE — LETTER
Jocelynn Seals Md  [facility], [address]       08/22/17        Patient: David Somers   YOB: 1930   Date of Visit: 8/22/2017       Dear  Terrance Kayser  :           As you know, Serjio Alvarez is an 59-year-old female who I am now evalu LABORATORY: CT scan the chest–June 2017: 9 x 8 x 6 mm irregular nodule in the right upper lobe peripherally noncalcified. Groundglass opacity 1 cm in size in the superior segment of the right lower lobe.     ASSESSMENT AND PLAN:  PROBLEM 1.  Pulmonary no

## (undated) NOTE — LETTER
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   This is a benign (not cancerous) growth, and requires no

## (undated) NOTE — LETTER
Maribeth Higuera  [facility]      8/20/2018    Dear Shakila Meza,    It has come to our attention that you are due for: CT in September. This test was ordered by Dr. Tolu Garcia. Please call for an appointment at 21 [phone].

## (undated) NOTE — MR AVS SNAPSHOT
[facility] SPECIALTY [facility] [place] [address] [address] [id number] [phone]               Thank you for choosing us for your health care visit with Chucho August MD.  We are glad to serve you and happy to provide you with this summary of y Benign neoplasm of skin of lower limb, including hip, unspecified laterality        Benign neoplasm of scalp and skin of neck          Instructions and Information about Your Health     None      Allergies as of Jun 12, 2017     No Known Allergies Summaries. If you've been to the Emergency Department or your doctor's office, you can view your past visit information in [employer] [facility] by going to Visits < Visit Summaries. [employer] [facility] questions? Call [phone] for help.   [employer] is NOT to be used for urge

## (undated) NOTE — LETTER
[facility]  [address]  [facility] [address]  [phone]          [facility], [place]  [address]  [facility] [address]  [phone]             REFERRAL ORDER         Patient Name:   Ladan Rinaldi, ([id number])   Sex: female  : 1930       Order Date:  2025  Authorizing Provider:   BEHAR, ALEX     Procedure:  PHYSICAL THERAPY EXTERNAL [[id number]]         Order #:   [id number]  Qty:  1     Priority:  Routine                   Class:   Ext - RFL     Standing Interval:          Standing Occurrences:          Expires on:            Expected by:    Associated DX:  Lumbar spondylosis (M47.816)  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  Please see 2-3 times per week for the next 4-6 weeks Please also work on lumbar range of motion, core strengthening and stabilization, Glut strengthening, hamstring and quadriceps stretching, iliopsoas and iliotibial band stretching, luis fernando  fascial release and soft tissue release, home exercise program, and modalities as needed. Number of Visits: 1, Ext - RFL, Routine, 1 visit            Comments:  Eval and treat.  Please see 2-3 times per week for the next 4-6 weeks  Please also work on lumbar range of motion, core strengthening and stabilization, Glut strengthening, hamstring and quadriceps stretching, iliopsoas and iliotibial band stretching, myofascial release and soft tissue release, home exercise program, and modalities as needed.  Number of Visits: 1           Scheduling Instructions:  **REFERRAL REQUEST**     Your physician has  referred you to a specialist.  Your physician or the clinic staff will provide you with the phone number you should call to schedule your appointment.      If you are confident that your benefit plan will not require a referral or authorization, such as [place] Medicaid, please feel free to schedule your appointment immediately. However, if you are unsure about the requirements for authorization, please wait 5-7 days and then contact your physician's office. At that time, you will be provided with any authorization numbers or be assured that none are required. You can then schedule your appointment. Failure to obtain required authorization numbers can create reimbursement difficulties for you.     SIGNED ORDER ON FILE  Authorizing Provider: Behar, Alex, MD [NPI: [id number]]  Date: Apr 14, 2025 at 3:16 PM  Ordering Department: [facility]

## (undated) NOTE — ED AVS SNAPSHOT
Michael Henry   MRN: [id number]    Department:  [facility] Emergency Department   Date of Visit:  5/19/2018           Disclosure     Insurance plans vary and the physician(s) referred by the ER may not be covered by your plan.  Please contact within the next three months to obtain basic health screening including reassessment of your blood pressure.     IF THERE IS ANY CHANGE OR WORSENING OF YOUR CONDITION, CALL YOUR PRIMARY CARE PHYSICIAN AT ONCE OR RETURN IMMEDIATELY TO THE EMERGENCY DEPARTMEN

## (undated) NOTE — LETTER
4/5/2023 [address]        [address] [address]         Dear Ana Praekh,    It has come to our attention that a required CT Chest test is due in May. Please call central scheduling at [phone] to schedule the test.     If you have any other questions, please contact our office at [phone].        Sincerely,    Roni Montilla MD  [facility], [address] [place] [address]  [address]  [address] [id number] [phone]

## (undated) NOTE — MR AVS SNAPSHOT
[address]  [address]  [phone]               Thank you for choosing us for your health care visit with Vashti Collins DPM.  We are glad to serve you and happy to provide yo Place 1 drop into both eyes nightly. Commonly known as:  XALATAN           NON FORMULARY   Inject 60 mg into the skin every 6 (six) months.  PROLIA           ofloxacin 0.3 % Soln   Commonly known as:  OCUFLOX           omeprazole 20 MG Cpdr   Take 1 capsu

## (undated) NOTE — LETTER
August 24, 2017         Adelaide Fernando MD  [address] [address]      Patient: Belinda Marinelli   YOB: 1930   Date of Visit: 8/22/2017       Dear Dr. Keon Mishra MD,    I saw your patient, Belinda Marinelli, on 8/22/2017.  Morganose murmur. Abdomen nontender, without hepatosplenomegaly and no mass appreciable. Extremities without clubbing cyanosis nor edema. Neurologic grossly intact with symmetric tone and strength and reflex.     LABORATORY: CT scan the chest–June 2017: 9 x 8 x 6 mm